# Patient Record
Sex: MALE | Race: WHITE | NOT HISPANIC OR LATINO | Employment: OTHER | URBAN - METROPOLITAN AREA
[De-identification: names, ages, dates, MRNs, and addresses within clinical notes are randomized per-mention and may not be internally consistent; named-entity substitution may affect disease eponyms.]

---

## 2017-02-07 ENCOUNTER — LAB VISIT (OUTPATIENT)
Dept: LAB | Facility: HOSPITAL | Age: 72
End: 2017-02-07
Attending: UROLOGY
Payer: MEDICARE

## 2017-02-07 DIAGNOSIS — R97.20 ELEVATED PSA: ICD-10-CM

## 2017-02-07 LAB
PROSTATE SPECIFIC ANTIGEN, TOTAL: 5.7 NG/ML
PSA FREE MFR SERPL: 20.35 %
PSA FREE SERPL-MCNC: 1.16 NG/ML

## 2017-02-07 PROCEDURE — 36415 COLL VENOUS BLD VENIPUNCTURE: CPT | Mod: PO

## 2017-02-07 PROCEDURE — 84153 ASSAY OF PSA TOTAL: CPT

## 2017-03-01 ENCOUNTER — OFFICE VISIT (OUTPATIENT)
Dept: UROLOGY | Facility: CLINIC | Age: 72
End: 2017-03-01
Payer: MEDICARE

## 2017-03-01 VITALS
HEIGHT: 72 IN | HEART RATE: 63 BPM | BODY MASS INDEX: 28.78 KG/M2 | SYSTOLIC BLOOD PRESSURE: 113 MMHG | DIASTOLIC BLOOD PRESSURE: 77 MMHG | WEIGHT: 212.5 LBS

## 2017-03-01 DIAGNOSIS — R97.20 ELEVATED PSA: Primary | ICD-10-CM

## 2017-03-01 DIAGNOSIS — Z79.2 PROPHYLACTIC ANTIBIOTIC: ICD-10-CM

## 2017-03-01 DIAGNOSIS — N40.1 BENIGN NON-NODULAR PROSTATIC HYPERPLASIA WITH LOWER URINARY TRACT SYMPTOMS: ICD-10-CM

## 2017-03-01 LAB
BILIRUB SERPL-MCNC: ABNORMAL MG/DL
BLOOD URINE, POC: ABNORMAL
COLOR, POC UA: YELLOW
GLUCOSE UR QL STRIP: 50
KETONES UR QL STRIP: ABNORMAL
LEUKOCYTE ESTERASE URINE, POC: ABNORMAL
NITRITE, POC UA: ABNORMAL
PH, POC UA: 5
PROTEIN, POC: ABNORMAL
SPECIFIC GRAVITY, POC UA: 1.02
UROBILINOGEN, POC UA: ABNORMAL

## 2017-03-01 PROCEDURE — 99213 OFFICE O/P EST LOW 20 MIN: CPT | Mod: PBBFAC,PO | Performed by: UROLOGY

## 2017-03-01 PROCEDURE — 81002 URINALYSIS NONAUTO W/O SCOPE: CPT | Mod: PBBFAC,PO | Performed by: UROLOGY

## 2017-03-01 PROCEDURE — 99214 OFFICE O/P EST MOD 30 MIN: CPT | Mod: S$PBB,,, | Performed by: UROLOGY

## 2017-03-01 PROCEDURE — 99999 PR PBB SHADOW E&M-EST. PATIENT-LVL III: CPT | Mod: PBBFAC,,, | Performed by: UROLOGY

## 2017-03-01 RX ORDER — TRIAMCINOLONE ACETONIDE 5 MG/G
CREAM TOPICAL
Refills: 0 | COMMUNITY
Start: 2017-02-07 | End: 2017-03-10 | Stop reason: CLARIF

## 2017-03-01 RX ORDER — CIPROFLOXACIN 500 MG/1
500 TABLET ORAL 2 TIMES DAILY
Qty: 6 TABLET | Refills: 0 | Status: SHIPPED | OUTPATIENT
Start: 2017-03-12 | End: 2017-03-15

## 2017-03-01 RX ORDER — FLUTICASONE PROPIONATE 50 MCG
SPRAY, SUSPENSION (ML) NASAL
Refills: 11 | COMMUNITY
Start: 2017-01-07 | End: 2018-10-19 | Stop reason: SDUPTHER

## 2017-03-01 NOTE — PROGRESS NOTES
[place]OLOGY [place]  3 1 17    Urinalysis: col yellow, sg 25, pH 5, leuco -, nitrites -, prot trace, glucose 50, bili -, blood -    c-c bph    Age 72, comes in for follow up on his bladder symptoms, has nocturia x 3-4, has some daytime frequency.  No pains or burning. Also, follows up on his psa level, which continues high. It was 5.7 last week, it was also 5.7 one year ago, and it was 4.7 two years ago.    Pt is concerned that the psa had not gone down. He has not had a prostate biopsy.    Pt has been put on flomax for symptoms of bph    He still has some daytime frequency which is off and on.        PMH     Surgical:  has past surgical history that includes Shoulder surgery; Tonsillectomy; and Elbow Arthroplasty.     Medical:  has a past medical history of Constipation, chronic; Hypothyroidism; Irregular heart beat; Hiatal hernia; and Inguinal hernia without mention of obstruction or gangrene, unilateral or unspecified, (not specified as recurrent).     Familial: both parents  of lung cancer     Social: , retired from the [employer]; has a small farm.            Current Outpatient Prescriptions on File Prior to Visit   Medication Sig Dispense Refill    aluminum hydrox-magnesium carb (GAVISCON EXTRA STRENGTH) 254-237.5 mg/5 mL Susp Take by mouth continuous prn.         aspirin (STANBACK HEADACHE POWDER) 650 mg Pack Take 650 mg by mouth as needed.         azelastine (ASTELIN) 137 mcg nasal spray 1 spray by Nasal route 2 (two) times daily.         fluticasone (FLONASE) 50 mcg/actuation nasal spray 1 spray by Each Nare route once daily.        levothyroxine (SYNTHROID) 88 MCG tablet Take 88 mcg by mouth once daily.        linaclotide (LINZESS) 145 mcg Cap Take 145 mcg by mouth once daily.        MULTIVITAMIN W-MINERALS/LUTEIN (CENTRUM SILVER ORAL) Take by mouth continuous prn.         omeprazole (PRILOSEC) 20 MG capsule Take 20 mg by mouth once daily.                                                 Current Facility-Administered Medications on File Prior to Visit   Medication Dose Route Frequency Provider Last Rate Last Dose    barium 2.1 % (w/v), 2.0 % (w/w) suspension 900 mL 900 mL Oral Once Ricky Arriola MD            ROS:  Denies malaise, headaches, eye symptoms, difficulty swallowing or breathing problems.   No chest pains or palpitations.   No change in bowel habits, no tarry stools or hematochezia. Has acid reflux.  No genital lesions.  No bleeding disorders, no seizures.     Pt alert, oriented, cooperative, no distress  HEENT: normocephalic, pupils round, equal, reactive to light and acommodation, extrinsic ocular movements full, conjunctiva pink. Oral and nasal cavities wnl.  Neck: supple, no JVD, no lymphadenopathy  Chest: CV NSR, no murmurs  Lungs: normal auscultation  Abdomen flat, nontender, no organomegaly, no masses.  No hernias  Penis nl, meatus nl  Testes noncircumcised; redundant prepuce is present, but slides well, epi nl, scrotum nl  ASHOK: anus nl, sphincter nl tone, mucosa without lesions, prostate 40 gm, symmetric, no nodules or indurations  Extremities: no edema, peripheral pulses nl  Neuro: preserved     IMP bph on flomax  Mild overactive bladder  Elevated psa, has not had a prostate biopsy, will suggest to do that.  Pt is willing to get that done, since he is worried about it.

## 2017-03-06 DIAGNOSIS — N40.0 BENIGN NON-NODULAR PROSTATIC HYPERPLASIA WITHOUT LOWER URINARY TRACT SYMPTOMS: ICD-10-CM

## 2017-03-06 RX ORDER — TAMSULOSIN HYDROCHLORIDE 0.4 MG/1
CAPSULE ORAL
Qty: 30 CAPSULE | Refills: 11 | Status: SHIPPED | OUTPATIENT
Start: 2017-03-06 | End: 2018-03-19 | Stop reason: SDUPTHER

## 2017-03-10 ENCOUNTER — ANESTHESIA EVENT (OUTPATIENT)
Dept: SURGERY | Facility: HOSPITAL | Age: 72
End: 2017-03-10
Payer: MEDICARE

## 2017-03-13 ENCOUNTER — SURGERY (OUTPATIENT)
Age: 72
End: 2017-03-13

## 2017-03-13 ENCOUNTER — ANESTHESIA (OUTPATIENT)
Dept: SURGERY | Facility: HOSPITAL | Age: 72
End: 2017-03-13
Payer: MEDICARE

## 2017-03-13 ENCOUNTER — HOSPITAL ENCOUNTER (OUTPATIENT)
Facility: HOSPITAL | Age: 72
Discharge: HOME | End: 2017-03-13
Attending: UROLOGY | Admitting: UROLOGY
Payer: MEDICARE

## 2017-03-13 DIAGNOSIS — R97.20 ELEVATED PSA: ICD-10-CM

## 2017-03-13 PROCEDURE — 88305 TISSUE EXAM BY PATHOLOGIST: CPT | Performed by: PATHOLOGY

## 2017-03-13 PROCEDURE — 36000705 HC OR TIME LEV I EA ADD 15 MIN: Mod: PO | Performed by: UROLOGY

## 2017-03-13 PROCEDURE — 25000003 PHARM REV CODE 250: Mod: PO | Performed by: NURSE ANESTHETIST, CERTIFIED REGISTERED

## 2017-03-13 PROCEDURE — 76872 US TRANSRECTAL: CPT | Mod: 26,,, | Performed by: UROLOGY

## 2017-03-13 PROCEDURE — 71000033 HC RECOVERY, INTIAL HOUR: Mod: PO | Performed by: UROLOGY

## 2017-03-13 PROCEDURE — 36000704 HC OR TIME LEV I 1ST 15 MIN: Mod: PO | Performed by: UROLOGY

## 2017-03-13 PROCEDURE — D9220A PRA ANESTHESIA: Mod: CRNA,,, | Performed by: NURSE ANESTHETIST, CERTIFIED REGISTERED

## 2017-03-13 PROCEDURE — 63600175 PHARM REV CODE 636 W HCPCS: Mod: PO | Performed by: NURSE ANESTHETIST, CERTIFIED REGISTERED

## 2017-03-13 PROCEDURE — D9220A PRA ANESTHESIA: Mod: ANES,,, | Performed by: ANESTHESIOLOGY

## 2017-03-13 PROCEDURE — 76942 ECHO GUIDE FOR BIOPSY: CPT | Mod: 26,59,, | Performed by: UROLOGY

## 2017-03-13 PROCEDURE — 25000003 PHARM REV CODE 250: Mod: PO | Performed by: UROLOGY

## 2017-03-13 PROCEDURE — 37000008 HC ANESTHESIA 1ST 15 MINUTES: Mod: PO | Performed by: UROLOGY

## 2017-03-13 PROCEDURE — 37000009 HC ANESTHESIA EA ADD 15 MINS: Mod: PO | Performed by: UROLOGY

## 2017-03-13 PROCEDURE — 88305 TISSUE EXAM BY PATHOLOGIST: CPT | Mod: 26,,, | Performed by: PATHOLOGY

## 2017-03-13 PROCEDURE — 25000003 PHARM REV CODE 250: Mod: PO | Performed by: ANESTHESIOLOGY

## 2017-03-13 PROCEDURE — 55700 PR BIOPSY OF PROSTATE,NEEDLE/PUNCH: CPT | Mod: ,,, | Performed by: UROLOGY

## 2017-03-13 RX ORDER — SODIUM CHLORIDE, SODIUM LACTATE, POTASSIUM CHLORIDE, CALCIUM CHLORIDE 600; 310; 30; 20 MG/100ML; MG/100ML; MG/100ML; MG/100ML
INJECTION, SOLUTION INTRAVENOUS CONTINUOUS
Status: DISCONTINUED | OUTPATIENT
Start: 2017-03-13 | End: 2017-03-13 | Stop reason: HOSPADM

## 2017-03-13 RX ORDER — MIDAZOLAM HYDROCHLORIDE 1 MG/ML
INJECTION, SOLUTION INTRAMUSCULAR; INTRAVENOUS
Status: DISCONTINUED | OUTPATIENT
Start: 2017-03-13 | End: 2017-03-13

## 2017-03-13 RX ORDER — LIDOCAINE HYDROCHLORIDE 10 MG/ML
1 INJECTION, SOLUTION EPIDURAL; INFILTRATION; INTRACAUDAL; PERINEURAL ONCE
Status: COMPLETED | OUTPATIENT
Start: 2017-03-13 | End: 2017-03-13

## 2017-03-13 RX ORDER — LIDOCAINE HYDROCHLORIDE 10 MG/ML
INJECTION INFILTRATION; PERINEURAL
Status: DISCONTINUED | OUTPATIENT
Start: 2017-03-13 | End: 2017-03-13 | Stop reason: HOSPADM

## 2017-03-13 RX ORDER — PROPOFOL 10 MG/ML
VIAL (ML) INTRAVENOUS
Status: DISCONTINUED | OUTPATIENT
Start: 2017-03-13 | End: 2017-03-13

## 2017-03-13 RX ORDER — OXYCODONE AND ACETAMINOPHEN 5; 325 MG/1; MG/1
1 TABLET ORAL
Status: DISCONTINUED | OUTPATIENT
Start: 2017-03-13 | End: 2017-03-13 | Stop reason: HOSPADM

## 2017-03-13 RX ORDER — ACETAMINOPHEN 500 MG
500 TABLET ORAL EVERY 4 HOURS PRN
Status: DISCONTINUED | OUTPATIENT
Start: 2017-03-13 | End: 2017-03-13 | Stop reason: HOSPADM

## 2017-03-13 RX ORDER — PROPOFOL 10 MG/ML
VIAL (ML) INTRAVENOUS CONTINUOUS PRN
Status: DISCONTINUED | OUTPATIENT
Start: 2017-03-13 | End: 2017-03-13

## 2017-03-13 RX ORDER — FENTANYL CITRATE 50 UG/ML
INJECTION, SOLUTION INTRAMUSCULAR; INTRAVENOUS
Status: DISCONTINUED | OUTPATIENT
Start: 2017-03-13 | End: 2017-03-13

## 2017-03-13 RX ORDER — SODIUM CHLORIDE, SODIUM LACTATE, POTASSIUM CHLORIDE, CALCIUM CHLORIDE 600; 310; 30; 20 MG/100ML; MG/100ML; MG/100ML; MG/100ML
25 INJECTION, SOLUTION INTRAVENOUS CONTINUOUS
Status: DISCONTINUED | OUTPATIENT
Start: 2017-03-13 | End: 2017-03-13 | Stop reason: HOSPADM

## 2017-03-13 RX ORDER — SODIUM CHLORIDE 0.9 % (FLUSH) 0.9 %
3 SYRINGE (ML) INJECTION
Status: DISCONTINUED | OUTPATIENT
Start: 2017-03-13 | End: 2017-03-13 | Stop reason: HOSPADM

## 2017-03-13 RX ORDER — HYDROCODONE BITARTRATE AND ACETAMINOPHEN 5; 325 MG/1; MG/1
1 TABLET ORAL EVERY 4 HOURS PRN
Status: DISCONTINUED | OUTPATIENT
Start: 2017-03-13 | End: 2017-03-13 | Stop reason: HOSPADM

## 2017-03-13 RX ORDER — FENTANYL CITRATE 50 UG/ML
25 INJECTION, SOLUTION INTRAMUSCULAR; INTRAVENOUS EVERY 5 MIN PRN
Status: DISCONTINUED | OUTPATIENT
Start: 2017-03-13 | End: 2017-03-13 | Stop reason: HOSPADM

## 2017-03-13 RX ORDER — ONDANSETRON 2 MG/ML
4 INJECTION INTRAMUSCULAR; INTRAVENOUS DAILY PRN
Status: DISCONTINUED | OUTPATIENT
Start: 2017-03-13 | End: 2017-03-13 | Stop reason: HOSPADM

## 2017-03-13 RX ORDER — LIDOCAINE HCL/PF 100 MG/5ML
SYRINGE (ML) INTRAVENOUS
Status: DISCONTINUED | OUTPATIENT
Start: 2017-03-13 | End: 2017-03-13

## 2017-03-13 RX ADMIN — MIDAZOLAM HYDROCHLORIDE 1 MG: 1 INJECTION, SOLUTION INTRAMUSCULAR; INTRAVENOUS at 09:03

## 2017-03-13 RX ADMIN — FENTANYL CITRATE 50 MCG: 50 INJECTION, SOLUTION INTRAMUSCULAR; INTRAVENOUS at 09:03

## 2017-03-13 RX ADMIN — LIDOCAINE HYDROCHLORIDE 10 ML: 10 INJECTION, SOLUTION EPIDURAL; INFILTRATION; INTRACAUDAL; PERINEURAL at 09:03

## 2017-03-13 RX ADMIN — SODIUM CHLORIDE, SODIUM LACTATE, POTASSIUM CHLORIDE, AND CALCIUM CHLORIDE: .6; .31; .03; .02 INJECTION, SOLUTION INTRAVENOUS at 08:03

## 2017-03-13 RX ADMIN — LIDOCAINE HYDROCHLORIDE 50 MG: 20 INJECTION PARENTERAL at 09:03

## 2017-03-13 RX ADMIN — PROPOFOL 50 MCG/KG/MIN: 10 INJECTION, EMULSION INTRAVENOUS at 09:03

## 2017-03-13 RX ADMIN — PROPOFOL 20 MG: 10 INJECTION, EMULSION INTRAVENOUS at 09:03

## 2017-03-13 RX ADMIN — LIDOCAINE HYDROCHLORIDE 10 ML: 10 INJECTION, SOLUTION INFILTRATION; PERINEURAL at 09:03

## 2017-03-13 NOTE — ANESTHESIA PREPROCEDURE EVALUATION
03/13/2017  Eligio Richardson is a 72 y.o., male.    [facility] Anesthesia Evaluation    I have reviewed the Patient Summary Reports.    I have reviewed the Nursing Notes.   I have reviewed the Medications.     Review of Systems  Anesthesia Hx:  No problems with previous Anesthesia    Social:  Non-Smoker, Alcohol Use    Cardiovascular:   Denies CAD.    Denies CABG/stent.   Denies Angina.    Pulmonary:   Sleep Apnea    Renal/:   Chronic Renal Disease, CRI Renal calculi: Cr 1.2.  Denies Other Renal / Gu Conditions   Hepatic/GI:   Hiatal Hernia, GERD, well controlled  Denies Hepatic/GI Symptoms  Denies Bowel Conditions    Musculoskeletal:   Arthritis   Spine Disorders: cervical Degenerative disease    Neurological:   Neuromuscular Disease,    Endocrine:   Hypothyroidism        Physical Exam  General:  Well nourished    Airway/Jaw/Neck:  Airway Findings: Mouth Opening: Normal Tongue: Normal  General Airway Assessment: Adult, Good  Mallampati: II  TM Distance: Normal, at least 6 cm  Jaw/Neck Findings:  Neck ROM: Extension Decreased, Mild, Extension Painful       Chest/Lungs:  Chest/Lungs Findings: Clear to auscultation, Normal Respiratory Rate     Heart/Vascular:  Heart Findings: Rate: Normal  Rhythm: Regular Rhythm  Sounds: Normal        Mental Status:  Mental Status Findings:  Cooperative, Alert and Oriented         Anesthesia Plan  Type of Anesthesia, risks & benefits discussed:  Anesthesia Type:  MAC  Patient's Preference:   Intra-op Monitoring Plan:   Intra-op Monitoring Plan Comments:   Post Op Pain Control Plan:   Post Op Pain Control Plan Comments:   Induction:   IV  Beta Blocker:  Patient is not currently on a Beta-Blocker (No further documentation required).       Informed Consent: Patient understands risks and agrees with Anesthesia plan.  Questions answered. Anesthesia consent signed with patient.  ASA Score:  3     Day of Surgery Review of History & Physical: I have interviewed and examined the patient. I have reviewed the patient's H&P dated:  There are no significant changes.          Ready For Surgery From Anesthesia Perspective.

## 2017-03-13 NOTE — OP NOTE
Site: [facility], [place]  Date of procedure: 3 13 17  Surgeon: Brandi  Assistant: none  Procedure: TRANSRECTAL ULTRASOUND OF PROSTATE WITH NEEDLE BIOPSIES, ULTRASOUND GUIDANCE FOR BIOPSIES  Preop dx: high psa  Postop dx: same  Complications: none  Ebl: none  Drains: none    Patient took a prophylactic antibiotic starting last night. He also gave himself a Fleet enema 2 hours prior to the procedure.     Pt was brought to the operating room and given satisfactory anesthesia. He was then placed in the L lateral decubitus position. We instilled a 10-ml enema of pure Betadine solution in the rectal ampulla and waited a few minutes.   The rectal probe of the Peppercorn 10-MHz BNephroPlus Ultrasound machine was inserted and multiple transverse and longitudinal scans were done.     Prostate measurements:    Prostate volume was 45.8 cm3  Transverse dimension 47.4 mm  Height 30.8 mm  Length 59.9 mm.      The general shape of the prostate was symmetric. Several small calcific densities were seen, as well as small cystic lesions. No definite hypoechoic areas were seen.     We performed a prostate block (pudendal block) with a total of 10 ml of xylocaine applied at the prostate base and prostate apex on both sides. Multiple biopsies were obtained using the Joosy Magnum Biopsy Needle under ultrasound guidance. We used the sextant topographic technique to distribute and send the biopsy samples. The patient tolerated the procedure well. He was then transferred to recovery room in satisfactory condition.    Patient was warned about possible complications, such as persistent hematuria, hematochezia, hematospermia, infection and sepsis. He is to continue with the antibiotic given until finished. Drink abundant fluids. Call in one week for pathology results

## 2017-03-13 NOTE — DISCHARGE SUMMARY
DISCHARGE SUMMARY:    Reason for hospitalization: elevated psa  Hospital course: pt was stable and comfortable  Final dx: elevated psa  Procedure performed: transrectal ultrasound of prostate, ultrasound guidance for needle biopsies, multiple transrectal needle biopsies  Condition on discharge: satisfactory  Discharge disposition: home  Follow up: Return to urology clinic in 1 week  Medication changes: no change with home meds  Pt instructions: drink abundant fluids  Continue with prescribed antibiotic prophylaxis until finished  Diet: regular  Activity: no restrictions

## 2017-03-13 NOTE — IP AVS SNAPSHOT
[facility]  [address] [facility]  [place] [address]  Phone: [phone]           Patient Discharge Instructions     Our goal is to set you up for success. This packet includes information on your condition, medications, and your home care. It will help you to care for yourself so you don't get sicker and need to go back to the hospital.     Please ask your nurse if you have any questions.        There are many details to remember when preparing to leave the hospital. Here is what you will need to do:    1. Take your medicine. If you are prescribed medications, review your Medication List in the following pages. You may have new medications to  at the pharmacy and others that you'll need to stop taking. Review the instructions for how and when to take your medications. Talk with your doctor or nurses if you are unsure of what to do.     2. Go to your follow-up appointments. Specific follow-up information is listed in the following pages. Your may be contacted by a transition nurse or clinical provider about future appointments. Be sure we have all of the phone numbers to reach you, if needed. Please contact your provider's office if you are unable to make an appointment.     3. Watch for warning signs. Your doctor or nurse will give you detailed warning signs to watch for and when to call for assistance. These instructions may also include educational information about your condition. If you experience any of warning signs to your health, call your doctor.               [facility]  Unless otherwise directed by your provider, please contact [facility], our nurse care line that is available for 24/7 assistance.     [phone] (toll-free)    Registered nurses in the [facility] provide clinical advisement, health education, appointment booking, and other advisory services.                    ** Verify the list of medication(s) below is accurate and up  to date. Carry this with you in case of emergency. If your medications have changed, please notify your healthcare provider.             Medication List      CONTINUE taking these medications        Additional Info                      ciprofloxacin HCl 500 MG tablet   Commonly known as:  CIPRO   Quantity:  6 tablet   Refills:  0   Dose:  500 mg    Instructions:  Take 1 tablet (500 mg total) by mouth 2 (two) times daily.     Begin Date    AM    Noon    PM    Bedtime       fluticasone 50 mcg/actuation nasal spray   Commonly known as:  FLONASE   Refills:  11    Instructions:  USE 2 SPRAYS EACH NOSTRIL DAILY     Begin Date    AM    Noon    PM    Bedtime       GAVISCON EXTRA STRENGTH 254-237.5 mg/5 mL Susp   Refills:  0   Generic drug:  aluminum hydrox-magnesium carb    Instructions:  Take by mouth continuous prn.     Begin Date    AM    Noon    PM    Bedtime       levothyroxine 88 MCG tablet   Commonly known as:  SYNTHROID   Quantity:  90 tablet   Refills:  3   Comments:  This prescription was filled today(11/14/2016). Any refills authorized will be placed on file.    Instructions:  TAKE 1 TABLET (88 MCG TOTAL) BY MOUTH ONCE DAILY.     Begin Date    AM    Noon    PM    Bedtime       linaclotide 145 mcg Cap capsule   Commonly known as:  LINZESS   Quantity:  60 capsule   Refills:  6   Dose:  145 mcg    Instructions:  Take 1 capsule (145 mcg total) by mouth Every 3 (three) days.     Begin Date    AM    Noon    PM    Bedtime       omeprazole 20 MG capsule   Commonly known as:  PRILOSEC   Quantity:  90 capsule   Refills:  3   Comments:  This prescription was filled today(2/2/2017). Any refills authorized will be placed on file.    Instructions:  TAKE ONE CAPSULE EVERY DAY FOR REFLUX     Begin Date    AM    Noon    PM    Bedtime       oxybutynin 10 MG 24 hr tablet   Commonly known as:  DITROPAN-XL   Quantity:  30 tablet   Refills:  11   Dose:  10 mg    Instructions:  Take 1 tablet (10 mg total) by mouth once daily.     Begin  Date    AM    Noon    PM    Bedtime       STANBACK HEADACHE POWDER 650 mg Pack   Refills:  0   Dose:  650 mg   Generic drug:  aspirin    Instructions:  Take 650 mg by mouth as needed.     Begin Date    AM    Noon    PM    Bedtime       tamsulosin 0.4 mg Cp24   Commonly known as:  FLOMAX   Quantity:  30 capsule   Refills:  11    Instructions:  TAKE 1 CAPSULE (0.4 MG TOTAL) BY MOUTH ONCE DAILY.     Begin Date    AM    Noon    PM    Bedtime                  Please bring to all follow up appointments:    1. A copy of your discharge instructions.  2. All medicines you are currently taking in their original bottles.  3. Identification and insurance card.    Please arrive 15 minutes ahead of scheduled appointment time.    Please call 24 hours in advance if you must reschedule your appointment and/or time.        Follow-up Information     Follow up with Andry Paz MD In 1 week.    Specialty:  Urology    Contact information:    [address]  [facility] [address]  [phone]          Discharge Instructions     Future Orders    Activity as tolerated     Diet general     Questions:    Total calories:      Fat restriction, if any:      Protein restriction, if any:      Na restriction, if any:      Fluid restriction:      Additional restrictions:          Discharge Instructions         Discharge Instructions: After Your Surgery  Youve just had surgery. During surgery you were given medicine called anesthesia to keep you relaxed and free of pain. After surgery you may have some pain or nausea. This is common. Here are some tips for feeling better and getting well after surgery.     Stay on schedule with your medication.   Going home  Your doctor or nurse will show you how to take care of yourself when you go home. He or she will also answer your questions. Have an adult family member or friend drive you home. For the first 24 hours after your surgery:  · Do not drive or use heavy equipment.  · Do not make important  decisions or sign legal papers.  · Do not drink alcohol.  · Have someone stay with you, if needed. He or she can watch for problems and help keep you safe.  Be sure to go to all follow-up visits with your doctor. And rest after your surgery for as long as your doctor tells you to.  Coping with pain  If you have pain after surgery, pain medicine will help you feel better. Take it as told, before pain becomes severe. Also, ask your doctor or pharmacist about other ways to control pain. This might be with heat, ice, or relaxation. And follow any other instructions your surgeon or nurse gives you.  Tips for taking pain medicine  To get the best relief possible, remember these points:  · Pain medicines can upset your stomach. Taking them with a little food may help.  · Most pain relievers taken by mouth need at least 20 to 30 minutes to start to work.  · Taking medicine on a schedule can help you remember to take it. Try to time your medicine so that you can take it before starting an activity. This might be before you get dressed, go for a walk, or sit down for dinner.  · Constipation is a common side effect of pain medicines. Call your doctor before taking any medicines such as laxatives or stool softeners to help ease constipation. Also ask if you should skip any foods. Drinking lots of fluids and eating foods such as fruits and vegetables that are high in fiber can also help. Remember, do not take laxatives unless your surgeon has prescribed them.  · Drinking alcohol and taking pain medicine can cause dizziness and slow your breathing. It can even be deadly. Do not drink alcohol while taking pain medicine.  · Pain medicine can make you react more slowly to things. Do not drive or run machinery while taking pain medicine.  Your health care provider may tell you to take acetaminophen to help ease your pain. Ask him or her how much you are supposed to take each day. Acetaminophen or other pain relievers may interact  with your prescription medicines or other over-the-counter (OTC) drugs. Some prescription medicines have acetaminophen and other ingredients. Using both prescription and OTC acetaminophen for pain can cause you to overdose. Read the labels on your OTC medicines with care. This will help you to clearly know the list of ingredients, how much to take, and any warnings. It may also help you not take too much acetaminophen. If you have questions or do not understand the information, ask your pharmacist or health care provider to explain it to you before you take the OTC medicine.  Managing nausea  Some people have an upset stomach after surgery. This is often because of anesthesia, pain, or pain medicine, or the stress of surgery. These tips will help you handle nausea and eat healthy foods as you get better. If you were on a special food plan before surgery, ask your doctor if you should follow it while you get better. These tips may help:  · Do not push yourself to eat. Your body will tell you when to eat and how much.  · Start off with clear liquids and soup. They are easier to digest.  · Next try semi-solid foods, such as mashed potatoes, applesauce, and gelatin, as you feel ready.  · Slowly move to solid foods. Dont eat fatty, rich, or spicy foods at first.  · Do not force yourself to have 3 large meals a day. Instead eat smaller amounts more often.  · Take pain medicines with a small amount of solid food, such as crackers or toast, to avoid nausea.     Call your surgeon if  · You still have pain an hour after taking medicine. The medicine may not be strong enough.  · You feel too sleepy, dizzy, or groggy. The medicine may be too strong.  · You have side effects like nausea, vomiting, or skin changes, such as rash, itching, or hives.       If you have obstructive sleep apnea  You were given anesthesia medicine during surgery to keep you comfortable and free of pain. After surgery, you may have more apnea spells  because of this medicine and other medicines you were given. The spells may last longer than usual.   At home:  · Keep using the continuous positive airway pressure (CPAP) device when you sleep. Unless your health care provider tells you not to, use it when you sleep, day or night. CPAP is a common device used to treat obstructive sleep apnea.  · Talk with your provider before taking any pain medicine, muscle relaxants, or sedatives. Your provider will tell you about the possible dangers of taking these medicines.  Date Last Reviewed: 10/16/2014  © 4118-2326 [employer]. [address] [place] [address]. All rights reserved. This information is not intended as a substitute for professional medical care. Always follow your healthcare professional's instructions.            PROSTATE BIOPSY      DOS:   Minimal activity for 24 hours.   May shower or bathe today.   Advance diet as tolerated.   Drink a lot of liquids until you see your doctor.   Expect blood in urine/stool for up to 3 weeks.   Expect blood in semen for up to 8 weeks.   Resume home medications as prescribed    DONT:   No driving for 24 hours or while taking narcotic pain medication   No aspirin, NSAIDS or blood thinners for 7 days   No sexual intercourse, heavy lifting, or strenuous activity for 7 days.   DO NOT TAKE ADDITIONAL TYLENOL/ACETAMINOPHEN WHILE TAKING NARCOTIC PAIN MEDICATION THAT CONTAINS TYLENOL/ACETAMINOPHEN.    CALL PHYSICIAN FOR:   Unable to urinate within 6 hours after surgery.   Excessive bleeding.    Fever>101   Persistent pain not relieved by pain medication.    Contact your physician for emergencies at [phone]          Primary Diagnosis     Your primary diagnosis was:  Elevated Prostate Specific Antigen (Psa)      Admission Information     Date & Time Provider Department [facility]    3/13/2017  7:43 AM Andry Paz MD [facility] [phone]      Care Providers     Provider  Role Specialty Primary office phone    Andry Paz MD Attending Provider Urology [phone]    Andry Paz MD Surgeon  Urology [phone]      Your Vitals Were     BP Pulse Temp Resp Height Weight    130/81 (BP Location: Left arm, Patient Position: Sitting, BP Method: Automatic) 60 97.9 °F (36.6 °C) (Skin) 16 6' (1.829 m) 96.2 kg (212 lb)    SpO2 BMI             95% 28.75 kg/m2         Recent Lab Values     No lab values to display.      Allergies as of 3/13/2017        Reactions    Iodinated Contrast Media - Iv Dye Rash      Advance Directives     An advance directive is a document which, in the event you are no longer able to make decisions for yourself, tells your healthcare team what kind of treatment you do or do not want to receive, or who you would like to make those decisions for you.  If you do not currently have an advance directive, [facility] encourages you to create one.  For more information call:  [phone]), [phone] ([phone]),  or log on to [email or web address].        Language Assistance Services     ATTENTION: Language assistance services are available, free of charge. Please call [phone].      ATENCIÓN: Si habla español, tiene a chambers disposición servicios gratuitos de asistencia lingüística. Llame al [phone].     CHÚ Ý: N?u b?n nói Ti?ng Vi?t, có các d?ch v? h? tr? ngôn ng? mi?n phí dành cho b?n. G?i s? [phone].         [facility] complies with applicable Federal civil rights laws and does not discriminate on the basis of race, color, national origin, age, disability, or sex.

## 2017-03-13 NOTE — TRANSFER OF CARE
Anesthesia Transfer of Care Note    Patient: Eligio Richardson    Procedure(s) Performed: Procedure(s) (LRB):  TRANSRECTAL ULTRASOUND GUIDED PROSTATE BIOPSY (N/A)    Patient location: PACU    Anesthesia Type: MAC    Transport from OR: Transported from OR on room air with adequate spontaneous ventilation    Post pain: adequate analgesia    Post assessment: no apparent anesthetic complications and tolerated procedure well    Post vital signs: stable    Level of consciousness: awake and sedated    Nausea/Vomiting: no nausea/vomiting    Complications: none          Last vitals:   Visit Vitals    BP (!) 150/72 (BP Location: Right arm, Patient Position: Lying, BP Method: Automatic)    Pulse 61    Temp 36.7 °C (98.1 °F) (Skin)    Resp 16    Ht 6' (1.829 m)    Wt 96.2 kg (212 lb)    BMI 28.75 kg/m2

## 2017-03-13 NOTE — ANESTHESIA POSTPROCEDURE EVALUATION
Anesthesia Post Evaluation    Patient: Eligio Richardson    Procedure(s) Performed: Procedure(s) (LRB):  TRANSRECTAL ULTRASOUND GUIDED PROSTATE BIOPSY (N/A)    Final Anesthesia Type: general  Patient location during evaluation: PACU  Patient participation: Yes- Able to Participate  Level of consciousness: awake and alert  Post-procedure vital signs: reviewed and stable  Pain management: adequate  Airway patency: patent  PONV status at discharge: No PONV  Anesthetic complications: no      Cardiovascular status: blood pressure returned to baseline and hemodynamically stable  Respiratory status: unassisted  Hydration status: euvolemic  Follow-up not needed.        Visit Vitals    BP (!) 143/74 (BP Location: Left arm, Patient Position: Sitting, BP Method: Automatic)    Pulse (!) 55    Temp 36.6 °C (97.9 °F) (Skin)    Resp 16    Ht 6' (1.829 m)    Wt 96.2 kg (212 lb)    SpO2 97%    BMI 28.75 kg/m2       Pain/Mellissa Score: Pain Assessment Performed: Yes (3/13/2017 10:16 AM)  Presence of Pain: denies (3/13/2017 10:16 AM)  Mellissa Score: 10 (3/13/2017 10:16 AM)

## 2017-03-13 NOTE — DISCHARGE INSTRUCTIONS
Discharge Instructions: After Your Surgery  Youve just had surgery. During surgery you were given medicine called anesthesia to keep you relaxed and free of pain. After surgery you may have some pain or nausea. This is common. Here are some tips for feeling better and getting well after surgery.     Stay on schedule with your medication.   Going home  Your doctor or nurse will show you how to take care of yourself when you go home. He or she will also answer your questions. Have an adult family member or friend drive you home. For the first 24 hours after your surgery:  · Do not drive or use heavy equipment.  · Do not make important decisions or sign legal papers.  · Do not drink alcohol.  · Have someone stay with you, if needed. He or she can watch for problems and help keep you safe.  Be sure to go to all follow-up visits with your doctor. And rest after your surgery for as long as your doctor tells you to.  Coping with pain  If you have pain after surgery, pain medicine will help you feel better. Take it as told, before pain becomes severe. Also, ask your doctor or pharmacist about other ways to control pain. This might be with heat, ice, or relaxation. And follow any other instructions your surgeon or nurse gives you.  Tips for taking pain medicine  To get the best relief possible, remember these points:  · Pain medicines can upset your stomach. Taking them with a little food may help.  · Most pain relievers taken by mouth need at least 20 to 30 minutes to start to work.  · Taking medicine on a schedule can help you remember to take it. Try to time your medicine so that you can take it before starting an activity. This might be before you get dressed, go for a walk, or sit down for dinner.  · Constipation is a common side effect of pain medicines. Call your doctor before taking any medicines such as laxatives or stool softeners to help ease constipation. Also ask if you should skip any foods. Drinking lots of  fluids and eating foods such as fruits and vegetables that are high in fiber can also help. Remember, do not take laxatives unless your surgeon has prescribed them.  · Drinking alcohol and taking pain medicine can cause dizziness and slow your breathing. It can even be deadly. Do not drink alcohol while taking pain medicine.  · Pain medicine can make you react more slowly to things. Do not drive or run machinery while taking pain medicine.  Your health care provider may tell you to take acetaminophen to help ease your pain. Ask him or her how much you are supposed to take each day. Acetaminophen or other pain relievers may interact with your prescription medicines or other over-the-counter (OTC) drugs. Some prescription medicines have acetaminophen and other ingredients. Using both prescription and OTC acetaminophen for pain can cause you to overdose. Read the labels on your OTC medicines with care. This will help you to clearly know the list of ingredients, how much to take, and any warnings. It may also help you not take too much acetaminophen. If you have questions or do not understand the information, ask your pharmacist or health care provider to explain it to you before you take the OTC medicine.  Managing nausea  Some people have an upset stomach after surgery. This is often because of anesthesia, pain, or pain medicine, or the stress of surgery. These tips will help you handle nausea and eat healthy foods as you get better. If you were on a special food plan before surgery, ask your doctor if you should follow it while you get better. These tips may help:  · Do not push yourself to eat. Your body will tell you when to eat and how much.  · Start off with clear liquids and soup. They are easier to digest.  · Next try semi-solid foods, such as mashed potatoes, applesauce, and gelatin, as you feel ready.  · Slowly move to solid foods. Dont eat fatty, rich, or spicy foods at first.  · Do not force yourself to  have 3 large meals a day. Instead eat smaller amounts more often.  · Take pain medicines with a small amount of solid food, such as crackers or toast, to avoid nausea.     Call your surgeon if  · You still have pain an hour after taking medicine. The medicine may not be strong enough.  · You feel too sleepy, dizzy, or groggy. The medicine may be too strong.  · You have side effects like nausea, vomiting, or skin changes, such as rash, itching, or hives.       If you have obstructive sleep apnea  You were given anesthesia medicine during surgery to keep you comfortable and free of pain. After surgery, you may have more apnea spells because of this medicine and other medicines you were given. The spells may last longer than usual.   At home:  · Keep using the continuous positive airway pressure (CPAP) device when you sleep. Unless your health care provider tells you not to, use it when you sleep, day or night. CPAP is a common device used to treat obstructive sleep apnea.  · Talk with your provider before taking any pain medicine, muscle relaxants, or sedatives. Your provider will tell you about the possible dangers of taking these medicines.  Date Last Reviewed: 10/16/2014  © 3017-7682 [employer]. [address] [place] [address], [place] [address]. All rights reserved. This information is not intended as a substitute for professional medical care. Always follow your healthcare professional's instructions.            PROSTATE BIOPSY      DOS:   Minimal activity for 24 hours.   May shower or bathe today.   Advance diet as tolerated.   Drink a lot of liquids until you see your doctor.   Expect blood in urine/stool for up to 3 weeks.   Expect blood in semen for up to 8 weeks.   Resume home medications as prescribed    DONT:   No driving for 24 hours or while taking narcotic pain medication   No aspirin, NSAIDS or blood thinners for 7 days   No sexual intercourse, heavy lifting, or strenuous activity  for 7 days.   DO NOT TAKE ADDITIONAL TYLENOL/ACETAMINOPHEN WHILE TAKING NARCOTIC PAIN MEDICATION THAT CONTAINS TYLENOL/ACETAMINOPHEN.    CALL PHYSICIAN FOR:   Unable to urinate within 6 hours after surgery.   Excessive bleeding.    Fever>101   Persistent pain not relieved by pain medication.    Contact your physician for emergencies at [phone]

## 2017-03-13 NOTE — TRANSFER OF CARE
Anesthesia Transfer of Care Note    Patient: Eligio Richardson    Procedure(s) Performed: Procedure(s) (LRB):  TRANSRECTAL ULTRASOUND GUIDED PROSTATE BIOPSY (N/A)    Patient location: PACU    Anesthesia Type: MAC    Transport from OR: Transported from OR on room air with adequate spontaneous ventilation    Post pain: adequate analgesia    Post assessment: no apparent anesthetic complications and tolerated procedure well    Post vital signs: stable    Level of consciousness: awake and sedated    Nausea/Vomiting: no nausea/vomiting    Complications: none          Last vitals:   Visit Vitals    /67 (BP Location: Left arm, Patient Position: Lying, BP Method: Automatic)    Pulse 66    Temp 36.6 °C (97.9 °F) (Skin)    Resp 16    Ht 6' (1.829 m)    Wt 96.2 kg (212 lb)    SpO2 (!) 94%    BMI 28.75 kg/m2

## 2017-03-13 NOTE — H&P
Age 72, with elevated psa of 5.7. The rectal exam has not shown any definite indurations or asymmetry of his prostate gland, but pt is worried because of the increased risk of prostate cancer. Pt has a family hx of cancer, although not precisely prostate cancer.     Pt has been put on flomax for symptoms of bph and has daytime frequency              PMH      Surgical: has past surgical history that includes Shoulder surgery; Tonsillectomy; and Elbow Arthroplasty.      Medical: has a past medical history of Constipation, chronic; Hypothyroidism; Irregular heart beat; Hiatal hernia; and Inguinal hernia without mention of obstruction or gangrene, unilateral or unspecified, (not specified as recurrent).      Familial: both parents  of lung cancer      Social: , retired from the [employer]; has a small farm.                  Current Outpatient Prescriptions on File Prior to Visit   Medication Sig Dispense Refill    aluminum hydrox-magnesium carb (GAVISCON EXTRA STRENGTH) 254-237.5 mg/5 mL Susp Take by mouth continuous prn.           aspirin (STANBACK HEADACHE POWDER) 650 mg Pack Take 650 mg by mouth as needed.           azelastine (ASTELIN) 137 mcg nasal spray 1 spray by Nasal route 2 (two) times daily.           fluticasone (FLONASE) 50 mcg/actuation nasal spray 1 spray by Each Nare route once daily.          levothyroxine (SYNTHROID) 88 MCG tablet Take 88 mcg by mouth once daily.          linaclotide (LINZESS) 145 mcg Cap Take 145 mcg by mouth once daily.          MULTIVITAMIN W-MINERALS/LUTEIN (CENTRUM SILVER ORAL) Take by mouth continuous prn.           omeprazole (PRILOSEC) 20 MG capsule Take 20 mg by mouth once daily.                                                                               Current Facility-Administered Medications on File Prior to Visit   Medication Dose Route Frequency Provider Last Rate Last Dose    barium 2.1 % (w/v), 2.0 % (w/w) suspension 900 mL 900 mL  Oral Once Ricky Arriola MD             ROS:  Denies malaise, headaches, eye symptoms, difficulty swallowing or breathing problems.   No chest pains or palpitations.   No change in bowel habits, no tarry stools or hematochezia. Has acid reflux.  No genital lesions.  No bleeding disorders, no seizures.      Pt alert, oriented, cooperative, no distress  HEENT: normocephalic, pupils round, equal, reactive to light and acommodation, extrinsic ocular movements full, conjunctiva pink. Oral and nasal cavities wnl.  Neck: supple, no JVD, no lymphadenopathy  Chest: CV NSR, no murmurs  Lungs: normal auscultation  Abdomen flat, nontender, no organomegaly, no masses.  No hernias  Penis nl, meatus nl  Testes noncircumcised; redundant prepuce is present, but slides well, epi nl, scrotum nl  ASHOK: anus nl, sphincter nl tone, mucosa without lesions, prostate 40 gm, symmetric, no nodules or indurations  Extremities: no edema, peripheral pulses nl  Neuro: preserved      IMP  Elevated psa  bph on flomax

## 2017-03-14 ENCOUNTER — TELEPHONE (OUTPATIENT)
Dept: UROLOGY | Facility: CLINIC | Age: 72
End: 2017-03-14

## 2017-03-14 VITALS
HEART RATE: 55 BPM | DIASTOLIC BLOOD PRESSURE: 74 MMHG | TEMPERATURE: 98 F | BODY MASS INDEX: 28.71 KG/M2 | WEIGHT: 212 LBS | HEIGHT: 72 IN | OXYGEN SATURATION: 97 % | RESPIRATION RATE: 16 BRPM | SYSTOLIC BLOOD PRESSURE: 143 MMHG

## 2017-03-14 NOTE — TELEPHONE ENCOUNTER
S/P TRUSP:  Spoke with patient, he is doing very well. NO pain, fever, discomfort, bleeding or difficulty urinating.

## 2017-03-16 ENCOUNTER — TELEPHONE (OUTPATIENT)
Dept: UROLOGY | Facility: CLINIC | Age: 72
End: 2017-03-16

## 2017-03-16 NOTE — TELEPHONE ENCOUNTER
----- Message from Andry Paz MD sent at 3/16/2017  1:13 PM CDT -----  Please let pt know that his prostate bx results were negative for malignancy. I'll see him in 6 mo.

## 2017-06-13 DIAGNOSIS — N32.81 OAB (OVERACTIVE BLADDER): ICD-10-CM

## 2017-06-13 RX ORDER — OXYBUTYNIN CHLORIDE 10 MG/1
10 TABLET, EXTENDED RELEASE ORAL DAILY
Qty: 30 TABLET | Refills: 11 | Status: SHIPPED | OUTPATIENT
Start: 2017-06-13 | End: 2018-06-06 | Stop reason: ALTCHOICE

## 2017-08-11 ENCOUNTER — OFFICE VISIT (OUTPATIENT)
Dept: OTOLARYNGOLOGY | Facility: CLINIC | Age: 72
End: 2017-08-11
Payer: MEDICARE

## 2017-08-11 VITALS
DIASTOLIC BLOOD PRESSURE: 90 MMHG | WEIGHT: 218.5 LBS | HEART RATE: 59 BPM | HEIGHT: 72 IN | BODY MASS INDEX: 29.59 KG/M2 | SYSTOLIC BLOOD PRESSURE: 148 MMHG

## 2017-08-11 DIAGNOSIS — R51.9 SEVERE FRONTAL HEADACHES: Primary | ICD-10-CM

## 2017-08-11 DIAGNOSIS — R51.9 TEMPORAL HEADACHE: ICD-10-CM

## 2017-08-11 DIAGNOSIS — R09.89 THROAT CLEARING: ICD-10-CM

## 2017-08-11 DIAGNOSIS — R09.82 PND (POST-NASAL DRIP): ICD-10-CM

## 2017-08-11 PROCEDURE — 99213 OFFICE O/P EST LOW 20 MIN: CPT | Mod: PBBFAC,PO | Performed by: NURSE PRACTITIONER

## 2017-08-11 PROCEDURE — 1159F MED LIST DOCD IN RCRD: CPT | Mod: ,,, | Performed by: NURSE PRACTITIONER

## 2017-08-11 PROCEDURE — 3008F BODY MASS INDEX DOCD: CPT | Mod: ,,, | Performed by: NURSE PRACTITIONER

## 2017-08-11 PROCEDURE — 1125F AMNT PAIN NOTED PAIN PRSNT: CPT | Mod: ,,, | Performed by: NURSE PRACTITIONER

## 2017-08-11 PROCEDURE — 99999 PR PBB SHADOW E&M-EST. PATIENT-LVL III: CPT | Mod: PBBFAC,,, | Performed by: NURSE PRACTITIONER

## 2017-08-11 PROCEDURE — 99213 OFFICE O/P EST LOW 20 MIN: CPT | Mod: S$PBB,,, | Performed by: NURSE PRACTITIONER

## 2017-08-11 NOTE — PROGRESS NOTES
"Subjective:       Patient ID: Eligio Richardson is a 72 y.o. male.    Chief Complaint: Sinus Problem; Headache; and hx of sinus polyps    HPI   Patient saw Dr. Amaro for sinus issues 4 years ago; CT was negative. H/o sinus surgery by Dr. Escalante at [facility] in 2011 for removal of polyp. He is here today for "keeps a bad headache" for over a month now. Interferes with his ability to function normally at times; must lay down on a heating pad. Headache is located across forehead and bilateral temples. Additionally he clears his throat a lot d/t PND sensation, but denies any anterior rhinorrhea, nasal congestion, or nasosinal symptoms.     Review of Systems   Constitutional: Negative.    HENT: Positive for postnasal drip. Negative for congestion and rhinorrhea.         Clearing throat often   Eyes: Negative.    Respiratory: Negative.    Cardiovascular: Negative.    Gastrointestinal: Negative.    Musculoskeletal: Negative.    Skin: Negative.    Neurological: Positive for headaches.   Hematological: Negative.    Psychiatric/Behavioral: Negative.        Objective:      Physical Exam   Constitutional: He is oriented to person, place, and time. Vital signs are normal. He appears well-developed and well-nourished. He is cooperative. He does not appear ill. No distress.   HENT:   Head: Normocephalic and atraumatic.   Right Ear: Hearing, tympanic membrane, external ear and ear canal normal. Tympanic membrane is not erythematous. No middle ear effusion.   Left Ear: Hearing, tympanic membrane, external ear and ear canal normal. Tympanic membrane is not erythematous.  No middle ear effusion.   Nose: Nose normal. No mucosal edema or rhinorrhea. Right sinus exhibits no maxillary sinus tenderness and no frontal sinus tenderness. Left sinus exhibits no maxillary sinus tenderness and no frontal sinus tenderness.   Mouth/Throat: Uvula is midline, oropharynx is clear and moist and mucous membranes are normal. Mucous membranes are not pale, " not dry and not cyanotic. No oral lesions. No oropharyngeal exudate, posterior oropharyngeal edema or posterior oropharyngeal erythema.   Eyes: Conjunctivae, EOM and lids are normal. Pupils are equal, round, and reactive to light. Right eye exhibits no discharge. Left eye exhibits no discharge. No scleral icterus.   Neck: Trachea normal and normal range of motion. Neck supple. No tracheal deviation present. No thyroid mass and no thyromegaly present.   Cardiovascular: Normal rate.    Pulmonary/Chest: Effort normal. No stridor. No respiratory distress. He has no wheezes.   Musculoskeletal: Normal range of motion.   Lymphadenopathy:        Head (right side): No submental, no submandibular, no tonsillar, no preauricular and no posterior auricular adenopathy present.        Head (left side): No submental, no submandibular, no tonsillar, no preauricular and no posterior auricular adenopathy present.     He has no cervical adenopathy.        Right cervical: No superficial cervical and no posterior cervical adenopathy present.       Left cervical: No superficial cervical and no posterior cervical adenopathy present.   Neurological: He is alert and oriented to person, place, and time. He has normal strength. Coordination and gait normal.   Skin: Skin is warm, dry and intact. No lesion and no rash noted. He is not diaphoretic. No cyanosis. No pallor.   Psychiatric: He has a normal mood and affect. His speech is normal and behavior is normal. Judgment and thought content normal. Cognition and memory are normal.   Nursing note and vitals reviewed.      Assessment:       1. Severe frontal headaches        Plan:     CT sinus. Will call with results as soon as available. If positive, will treat accordingly. If negative, see Dr. Lehman or Dr. Sams.    Discussed typical constellation of symptoms seen with acute allergic rhinitis exacerbation, acute bacterial sinusitis, and LPRD.   Return to clinic if symptoms worsen/persist and as  needed for further ENT concerns

## 2017-08-16 ENCOUNTER — TELEPHONE (OUTPATIENT)
Dept: OTOLARYNGOLOGY | Facility: CLINIC | Age: 72
End: 2017-08-16

## 2017-08-16 ENCOUNTER — HOSPITAL ENCOUNTER (OUTPATIENT)
Dept: RADIOLOGY | Facility: HOSPITAL | Age: 72
Discharge: HOME | End: 2017-08-16
Attending: NURSE PRACTITIONER
Payer: MEDICARE

## 2017-08-16 DIAGNOSIS — R51.9 SEVERE FRONTAL HEADACHES: ICD-10-CM

## 2017-08-16 PROCEDURE — 70486 CT MAXILLOFACIAL W/O DYE: CPT | Mod: TC,PO

## 2017-08-16 PROCEDURE — 70486 CT MAXILLOFACIAL W/O DYE: CPT | Mod: 26,,, | Performed by: RADIOLOGY

## 2017-08-16 NOTE — TELEPHONE ENCOUNTER
----- Message from Florina Graves NP sent at 8/16/2017  3:41 PM CDT -----  Dr. Hernandez -- this patient was seen for severe frontal headaches interfering with his ability to function normally at times. CT sinus was obtained today. Please review and advise whether patient would possibly benefit from balloon sinuplasty. Will also refer him to neurology for evaluation of headaches, but want to make sure cleared by ENT prior to seeing neuro.

## 2017-08-17 NOTE — TELEPHONE ENCOUNTER
----- Message from Florina Graves NP sent at 8/17/2017  7:52 AM CDT -----  Patient does not have sinus disease causing his headache pain. He should see either Dr. Lehman or Dr. Sams to evaluate his headaches.     Thank you,  Florina    ----- Message -----  From: Dick Hernandez MD  Sent: 8/17/2017   7:19 AM  To: ROBYN Zeng,    I reviewed his scan and note. Despite the report, his sinus disease is minimal. He does have some mild opacification in his ethmoids, but these are not addressable with sinuplasty. The small retention cysts in his maxillary sinuses are certainly not contributing to his symptoms, and the amount of disease in the other sinuses is minimal. He doesn't appear to have a contact point from the septum or turbinate that can sometimes cause facial pressure.     I definitely agree with Neurology evaluation. I highly suspect this is a headache disorder. In the absence of >2 sinonasal symptoms (nasal obstruction, drainage, decreased smell, recurrent infections, etc... For > 3 months) I would not offer sinus surgery with a scan like that and the degree of headache symptoms he is having, much less sinuplasty. Very low that I would help him. If nasal symptoms start acting up for a long period, it's a different story - but I still would never promise to cure the headache. I'm happy to see him and take a look in his nose to make sure I'm not missing something, but I do not think he would benefit from surgery.    Dick    ----- Message -----  From: Florina Graves NP  Sent: 8/16/2017   4:03 PM  To: MD Dr. Mary Mayer -- this patient was seen for severe frontal headaches interfering with his ability to function normally at times. CT sinus was obtained today. Please review and advise whether patient would possibly benefit from balloon sinuplasty. Will also refer him to neurology for evaluation of headaches, but want to make sure cleared by ENT prior to seeing neuro.

## 2017-09-12 PROBLEM — M26.609 TMJ DYSFUNCTION: Status: ACTIVE | Noted: 2017-09-12

## 2018-02-20 ENCOUNTER — OFFICE VISIT (OUTPATIENT)
Dept: DERMATOLOGY | Facility: CLINIC | Age: 73
End: 2018-02-20
Payer: MEDICARE

## 2018-02-20 VITALS — BODY MASS INDEX: 28.36 KG/M2 | HEIGHT: 73 IN | WEIGHT: 214 LBS

## 2018-02-20 DIAGNOSIS — Z85.828 PERSONAL HISTORY OF OTHER MALIGNANT NEOPLASM OF SKIN: Primary | ICD-10-CM

## 2018-02-20 DIAGNOSIS — Z12.83 SKIN CANCER SCREENING: ICD-10-CM

## 2018-02-20 DIAGNOSIS — D48.5 NEOPLASM OF UNCERTAIN BEHAVIOR OF SKIN: ICD-10-CM

## 2018-02-20 DIAGNOSIS — L57.0 MULTIPLE ACTINIC KERATOSES: ICD-10-CM

## 2018-02-20 PROCEDURE — 1159F MED LIST DOCD IN RCRD: CPT | Mod: ,,, | Performed by: DERMATOLOGY

## 2018-02-20 PROCEDURE — 1126F AMNT PAIN NOTED NONE PRSNT: CPT | Mod: ,,, | Performed by: DERMATOLOGY

## 2018-02-20 PROCEDURE — 99213 OFFICE O/P EST LOW 20 MIN: CPT | Mod: PBBFAC,PO | Performed by: DERMATOLOGY

## 2018-02-20 PROCEDURE — 17000 DESTRUCT PREMALG LESION: CPT | Mod: S$PBB,,, | Performed by: DERMATOLOGY

## 2018-02-20 PROCEDURE — 88305 TISSUE EXAM BY PATHOLOGIST: CPT | Performed by: PATHOLOGY

## 2018-02-20 PROCEDURE — 17000 DESTRUCT PREMALG LESION: CPT | Mod: PBBFAC,PO | Performed by: DERMATOLOGY

## 2018-02-20 PROCEDURE — 99999 PR PBB SHADOW E&M-EST. PATIENT-LVL III: CPT | Mod: PBBFAC,,, | Performed by: DERMATOLOGY

## 2018-02-20 PROCEDURE — 11100 PR BIOPSY OF SKIN LESION: CPT | Mod: 59,S$PBB,, | Performed by: DERMATOLOGY

## 2018-02-20 PROCEDURE — 17003 DESTRUCT PREMALG LES 2-14: CPT | Mod: S$PBB,,, | Performed by: DERMATOLOGY

## 2018-02-20 PROCEDURE — 11100 PR BIOPSY OF SKIN LESION: CPT | Mod: 59,PBBFAC,PO | Performed by: DERMATOLOGY

## 2018-02-20 PROCEDURE — 17003 DESTRUCT PREMALG LES 2-14: CPT | Mod: PBBFAC,PO | Performed by: DERMATOLOGY

## 2018-02-20 PROCEDURE — 99202 OFFICE O/P NEW SF 15 MIN: CPT | Mod: 25,S$PBB,, | Performed by: DERMATOLOGY

## 2018-02-20 NOTE — PROGRESS NOTES
Subjective:       Patient ID:  Eligio Richardson is a 73 y.o. male who presents for   Chief Complaint   Patient presents with    Skin Check    Lesion     Patient here for initial visit skin check. Last seen by Dr. 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 Negative for rash, daily sunscreen use and recent sunburn.        Objective:    Physical Exam   Constitutional: He appears well-developed and well-nourished. No distress.   HENT:   Head:       Mouth/Throat: Lips normal.    Eyes: Lids are normal.  No conjunctival no injection.   Neurological: He is alert and oriented to person, place, and time. He is not disoriented.   Psychiatric: He has a normal mood and affect.   Skin:   Areas Examined (abnormalities noted in diagram):   Head / Face Inspection Performed  Neck Inspection Performed  Chest / Axilla Inspection Performed  Back Inspection Performed  RUE Inspected  LUE Inspection Performed                   Diagram Legend     Erythematous scaling macule/papule c/w actinic keratosis       Vascular papule c/w angioma      Pigmented verrucoid papule/plaque c/w seborrheic keratosis      Yellow umbilicated papule c/w sebaceous hyperplasia      Irregularly shaped tan macule c/w lentigo     1-2 mm smooth white papules consistent with Milia      Movable subcutaneous cyst with punctum c/w epidermal inclusion cyst      Subcutaneous movable cyst c/w pilar cyst      Firm pink to brown papule c/w dermatofibroma      Pedunculated fleshy papule(s) c/w skin tag(s)      Evenly pigmented macule c/w junctional nevus     Mildly variegated pigmented, slightly  irregular-bordered macule c/w mildly atypical nevus      Flesh colored to evenly pigmented papule c/w intradermal nevus       Pink pearly papule/plaque c/w basal cell carcinoma      Erythematous hyperkeratotic cursted plaque c/w SCC      Surgical scar with no sign of skin cancer recurrence      Open and closed comedones      Inflammatory papules and pustules      Verrucoid papule consistent consistent with wart     Erythematous eczematous patches and plaques     Dystrophic onycholytic nail with subungual debris c/w onychomycosis     Umbilicated papule    Erythematous-base heme-crusted tan verrucoid plaque consistent with inflamed seborrheic keratosis     Erythematous Silvery Scaling Plaque c/w Psoriasis     See annotation      Assessment / Plan:      Pathology Orders:     Normal Orders This Visit    Tissue Specimen To Pathology, Dermatology     Questions:    Directional Terms:  Other(comment)    Clinical information:  bcc vs ak vs other    Specific Site:  left cheek        Personal history of other malignant neoplasm of skin  Area(s) of previous NMSC evaluated with no signs of recurrence.    Upper body skin examination performed today including at least 6 points as noted in physical examination. Suspicious lesions noted.      Skin cancer screening  Area(s) of previous NMSC evaluated with no signs of recurrence.    Upper body skin examination performed today including at least 6 points as noted in physical examination. Suspicious lesions noted.      Multiple actinic keratoses  Cryosurgery Procedure Note    Verbal consent from the patient is obtained and the patient is aware of the precancerous quality and need for treatment of these lesions. Liquid nitrogen cryosurgery is applied to the 11 actinic keratoses, as detailed in the physical exam, to produce a freeze injury. The patient is aware that blisters may form and is instructed on wound care with gentle cleansing and use of vaseline ointment to keep moist until  healed. The patient is supplied a handout on cryosurgery and is instructed to call if lesions do not completely resolve. Discussed risk postinflammatory pigmentary changes.     Neoplasm of uncertain behavior of skin  -     Tissue Specimen To Pathology, Dermatology    Shave biopsy procedure note:    Shave biopsy performed after verbal consent including risk of infection, scar, recurrence, need for additional treatment of site. Area prepped with alcohol, anesthetized with approximately 1.0cc of 1% lidocaine with epinephrine. Lesional tissue shaved with razor blade. Hemostasis achieved with application of aluminum chloride followed by hyfrecation. No complications. Dressing applied. Wound care explained.                 Follow-up in about 6 months (around 8/20/2018).

## 2018-02-28 ENCOUNTER — TELEPHONE (OUTPATIENT)
Dept: DERMATOLOGY | Facility: CLINIC | Age: 73
End: 2018-02-28

## 2018-02-28 NOTE — TELEPHONE ENCOUNTER
"----- Message from Avis Berumen MD sent at 2/27/2018  7:40 AM CST -----  Please call with results. Lesion on cheek was a precancer. Allow to heal from biopsy, f/u with me in 6-8 weeks to ensure resolved.     FINAL PATHOLOGIC DIAGNOSIS  1. Skin, left cheek, shave biopsy:  - INFLAMED ACTINIC KERATOSIS.  MICROSCOPIC DESCRIPTION: Sections show atypia and maturation disarray within the lowermost epidermal  layers associated with hyper- and parakeratosis and solar elastosis. An inflammatory infiltrate is additionally seen.  Multiple levels were examined.  Diagnosed by: Mj Olson M.D.  (Electronically Signed: 2018-02-26 12:33:58)  Gross Description  C # [id number]  Received in formalin in the specimen container labeled with the patient's name, "left cheek", is a tan soft tissue  fragment measuring 0.3 x 0.2 x 0.1 cm. The specimen is grossly unremarkable. The possible resection margin is  inked blue. The specimen is entirely submitted in one cassette.  Giuliana Mayes  Page 1  "

## 2018-03-19 DIAGNOSIS — N40.0 BENIGN NON-NODULAR PROSTATIC HYPERPLASIA WITHOUT LOWER URINARY TRACT SYMPTOMS: ICD-10-CM

## 2018-03-19 RX ORDER — TAMSULOSIN HYDROCHLORIDE 0.4 MG/1
CAPSULE ORAL
Qty: 30 CAPSULE | Refills: 11 | Status: SHIPPED | OUTPATIENT
Start: 2018-03-19 | End: 2019-03-20 | Stop reason: SDUPTHER

## 2018-05-10 ENCOUNTER — TELEPHONE (OUTPATIENT)
Dept: DERMATOLOGY | Facility: CLINIC | Age: 73
End: 2018-05-10

## 2018-05-10 NOTE — TELEPHONE ENCOUNTER
Pt contacted & appointment scheduled.     ----- Message from Alexander Ma sent at 5/10/2018 10:53 AM CDT -----  Contact: wife- Annie [phone]  Type:  Patient Returning Call    Who Called:  Micah Valencia   Who Left Message for Patient:  Nurse -  Does the patient know what this is regarding?:  No Best Call Back Number:  [phone] Additional Information:  Patient returning the nurse phone call.

## 2018-06-06 ENCOUNTER — TELEPHONE (OUTPATIENT)
Dept: UROLOGY | Facility: CLINIC | Age: 73
End: 2018-06-06

## 2018-06-06 ENCOUNTER — LAB VISIT (OUTPATIENT)
Dept: LAB | Facility: HOSPITAL | Age: 73
End: 2018-06-06
Attending: UROLOGY
Payer: MEDICARE

## 2018-06-06 ENCOUNTER — OFFICE VISIT (OUTPATIENT)
Dept: UROLOGY | Facility: CLINIC | Age: 73
End: 2018-06-06
Payer: MEDICARE

## 2018-06-06 VITALS
SYSTOLIC BLOOD PRESSURE: 130 MMHG | BODY MASS INDEX: 28.98 KG/M2 | WEIGHT: 218.69 LBS | HEIGHT: 73 IN | DIASTOLIC BLOOD PRESSURE: 84 MMHG | HEART RATE: 64 BPM

## 2018-06-06 DIAGNOSIS — R97.20 ELEVATED PSA: ICD-10-CM

## 2018-06-06 DIAGNOSIS — N40.0 BENIGN PROSTATIC HYPERPLASIA, UNSPECIFIED WHETHER LOWER URINARY TRACT SYMPTOMS PRESENT: Primary | ICD-10-CM

## 2018-06-06 DIAGNOSIS — N32.81 OAB (OVERACTIVE BLADDER): ICD-10-CM

## 2018-06-06 LAB
BILIRUB SERPL-MCNC: NORMAL MG/DL
BLOOD URINE, POC: NORMAL
COLOR, POC UA: YELLOW
COMPLEXED PSA SERPL-MCNC: 6.8 NG/ML
GLUCOSE UR QL STRIP: NORMAL
KETONES UR QL STRIP: NORMAL
LEUKOCYTE ESTERASE URINE, POC: NORMAL
NITRITE, POC UA: NORMAL
PH, POC UA: 5.5
PROTEIN, POC: NORMAL
SPECIFIC GRAVITY, POC UA: 1.02
UROBILINOGEN, POC UA: NORMAL

## 2018-06-06 PROCEDURE — 36415 COLL VENOUS BLD VENIPUNCTURE: CPT | Mod: PO

## 2018-06-06 PROCEDURE — 99213 OFFICE O/P EST LOW 20 MIN: CPT | Mod: PBBFAC,PO | Performed by: UROLOGY

## 2018-06-06 PROCEDURE — 81002 URINALYSIS NONAUTO W/O SCOPE: CPT | Mod: PBBFAC,PO | Performed by: UROLOGY

## 2018-06-06 PROCEDURE — 84153 ASSAY OF PSA TOTAL: CPT

## 2018-06-06 PROCEDURE — 99999 PR PBB SHADOW E&M-EST. PATIENT-LVL III: CPT | Mod: PBBFAC,,, | Performed by: UROLOGY

## 2018-06-06 PROCEDURE — 99214 OFFICE O/P EST MOD 30 MIN: CPT | Mod: S$PBB,,, | Performed by: UROLOGY

## 2018-06-06 RX ORDER — GABAPENTIN 300 MG/1
CAPSULE ORAL
Refills: 3 | COMMUNITY
Start: 2018-04-11 | End: 2018-10-19 | Stop reason: SDUPTHER

## 2018-06-06 RX ORDER — MELOXICAM 15 MG/1
15 TABLET ORAL DAILY
Refills: 3 | COMMUNITY
Start: 2018-05-16 | End: 2019-05-31 | Stop reason: SDUPTHER

## 2018-06-06 NOTE — TELEPHONE ENCOUNTER
----- Message from RT Jak sent at 6/6/2018 12:48 PM CDT -----  Contact: Annie,wife,[phone]   Annie,wife,[phone], requesting a call back soon she have questions concerning the pt's medication directions, thanks.

## 2018-06-06 NOTE — PROGRESS NOTES
UROLOGY [place]  6 6 18    c-c urinary frequency and urgency    Age 73. Says he is having urgency and at times leaks on the way to the bathroom. He is already on flomax for bph and on ditropan xl 10 daily for urinary frequency and urgency. noctura x 2-3 on bad night, x 1 on good night.     Also being followed for elevated psa of 5.7. The rectal exam has not shown any definite indurations or asymmetry of his prostate gland, but pt is worried. Last year he had a prostate biopsy in 2017 and it was negative for malignancy.     Pt has a family hx of cancer, and a maternal uncle with prostate cancer.         PMH      Surgical: has past surgical history that includes Shoulder surgery; Tonsillectomy; and Elbow Arthroplasty.      Medical: has a past medical history of Constipation, chronic; Hypothyroidism; Irregular heart beat; Hiatal hernia; and Inguinal hernia without mention of obstruction or gangrene, unilateral or unspecified, (not specified as recurrent).      Familial: both parents  of lung cancer      Social: , retired from the [employer]; has a small farm.                  Current Outpatient Prescriptions on File Prior to Visit   Medication Sig Dispense Refill    aluminum hydrox-magnesium carb (GAV Take by mouth continuous prn.           aspirin (STANBACK HEADACHE POWDER) Take 650 mg by mouth as needed.           azelastine (ASTELIN) 137 mcg nasal spray 1 spray by Nasal route 2 (two) [place]          fluticasone (FLONASE) 50 mcg/actuation nasal spray 1 spray by Each Nare route once daily.          levothyroxine (SYNTHROID) 88 MCG tablet Take 88 mcg by mouth once daily.          linaclotide (LINZESS) 145 mcg Cap Take 145 mcg by mouth once daily.          MULTIVITAMIN W-MINERALS/MILES Take by mouth continuous prn.           omeprazole (PRILOSEC) 20 MG capsule Take 20 mg by mouth once daily.             ROS:  Denies malaise, headaches, eye symptoms, difficulty swallowing or breathing  problems.   No chest pains or palpitations.   No change in bowel habits, no tarry stools or hematochezia. Has acid reflux.  No genital lesions.  No bleeding disorders, no seizures.  pscych normal affect, normal mood      Pt alert, oriented, no distress  HEENT: s wnl.  Neck: supple, no JVD, no lymphadenopathy  Chest: CV NSR  Lungs: normal chest expansion  Abdomen flat, nontender, no organomegaly, no masses.  No hernias  Penis nl, meatus nl  Testes noncircumcised; redundant prepuce is present, but slides well, epi nl, scrotum nl  ASHOK: anus nl, sphincter nl tone, mucosa without lesions, prostate 40 gm, symmetric, no nodules or indurations  Extremities: no edema, peripheral pulses nl  Neuro: preserved      IMP  Elevated psa  bph on flomax  Overactive bladder, on ditropan 10 xl, can stay on it.  I can add myrbetriq 25 daily  Eventually may need cystoscopy to observe the bladder neck and see if pt is a candidate for turp  Will check psa and see what level he has now

## 2018-06-11 ENCOUNTER — TELEPHONE (OUTPATIENT)
Dept: UROLOGY | Facility: CLINIC | Age: 73
End: 2018-06-11

## 2018-06-11 DIAGNOSIS — Z79.2 PROPHYLACTIC ANTIBIOTIC: Primary | ICD-10-CM

## 2018-06-11 DIAGNOSIS — N40.1 BENIGN PROSTATIC HYPERPLASIA WITH LOWER URINARY TRACT SYMPTOMS, SYMPTOM DETAILS UNSPECIFIED: Primary | ICD-10-CM

## 2018-06-11 DIAGNOSIS — R97.20 ELEVATED PSA: ICD-10-CM

## 2018-06-11 RX ORDER — CIPROFLOXACIN 500 MG/1
500 TABLET ORAL 2 TIMES DAILY
Qty: 6 TABLET | Refills: 0 | Status: SHIPPED | OUTPATIENT
Start: 2018-06-17 | End: 2018-06-20

## 2018-06-11 NOTE — TELEPHONE ENCOUNTER
Patient scheduled for CYsto and Prostate biopsy on 6/18/18.  Written instructions mailed to patient and reviewed via telephone.  Cipro sent to MD to send to pharmacy.

## 2018-06-11 NOTE — TELEPHONE ENCOUNTER
----- Message from Char Gu LPN sent at 6/8/2018  3:24 PM CDT -----      ----- Message -----  From: Andry Paz MD  Sent: 6/7/2018   5:15 PM  To: [facility] Staff    Schedule cysto and prostate biopsy in ASC.

## 2018-06-15 ENCOUNTER — ANESTHESIA EVENT (OUTPATIENT)
Dept: SURGERY | Facility: HOSPITAL | Age: 73
End: 2018-06-15
Payer: MEDICARE

## 2018-06-18 ENCOUNTER — SURGERY (OUTPATIENT)
Age: 73
End: 2018-06-18

## 2018-06-18 ENCOUNTER — HOSPITAL ENCOUNTER (OUTPATIENT)
Facility: HOSPITAL | Age: 73
Discharge: HOME | End: 2018-06-18
Attending: UROLOGY | Admitting: UROLOGY
Payer: MEDICARE

## 2018-06-18 ENCOUNTER — ANESTHESIA (OUTPATIENT)
Dept: SURGERY | Facility: HOSPITAL | Age: 73
End: 2018-06-18
Payer: MEDICARE

## 2018-06-18 VITALS
RESPIRATION RATE: 18 BRPM | TEMPERATURE: 98 F | BODY MASS INDEX: 28.89 KG/M2 | SYSTOLIC BLOOD PRESSURE: 139 MMHG | WEIGHT: 218 LBS | DIASTOLIC BLOOD PRESSURE: 81 MMHG | HEIGHT: 73 IN | HEART RATE: 66 BPM | OXYGEN SATURATION: 99 %

## 2018-06-18 DIAGNOSIS — N40.1 BENIGN PROSTATIC HYPERPLASIA WITH LOWER URINARY TRACT SYMPTOMS, SYMPTOM DETAILS UNSPECIFIED: ICD-10-CM

## 2018-06-18 DIAGNOSIS — N40.1 BENIGN PROSTATIC HYPERPLASIA WITH URINARY OBSTRUCTION: Primary | ICD-10-CM

## 2018-06-18 DIAGNOSIS — R97.20 ELEVATED PSA: ICD-10-CM

## 2018-06-18 DIAGNOSIS — N13.8 BENIGN PROSTATIC HYPERPLASIA WITH URINARY OBSTRUCTION: Primary | ICD-10-CM

## 2018-06-18 PROCEDURE — 76942 ECHO GUIDE FOR BIOPSY: CPT | Mod: 26,59,, | Performed by: UROLOGY

## 2018-06-18 PROCEDURE — 36000707: Mod: PO | Performed by: UROLOGY

## 2018-06-18 PROCEDURE — 71000033 HC RECOVERY, INTIAL HOUR: Mod: PO | Performed by: UROLOGY

## 2018-06-18 PROCEDURE — 63600175 PHARM REV CODE 636 W HCPCS: Mod: PO | Performed by: NURSE ANESTHETIST, CERTIFIED REGISTERED

## 2018-06-18 PROCEDURE — G0416 PROSTATE BIOPSY, ANY MTHD: HCPCS | Performed by: PATHOLOGY

## 2018-06-18 PROCEDURE — 88341 IMHCHEM/IMCYTCHM EA ADD ANTB: CPT | Mod: 26,,, | Performed by: PATHOLOGY

## 2018-06-18 PROCEDURE — 88305 TISSUE EXAM BY PATHOLOGIST: CPT | Mod: 26,,, | Performed by: PATHOLOGY

## 2018-06-18 PROCEDURE — 36000706: Mod: PO | Performed by: UROLOGY

## 2018-06-18 PROCEDURE — 55700 PR BIOPSY OF PROSTATE,NEEDLE/PUNCH: CPT | Mod: ,,, | Performed by: UROLOGY

## 2018-06-18 PROCEDURE — 37000008 HC ANESTHESIA 1ST 15 MINUTES: Mod: PO | Performed by: UROLOGY

## 2018-06-18 PROCEDURE — 76872 US TRANSRECTAL: CPT | Mod: 26,,, | Performed by: UROLOGY

## 2018-06-18 PROCEDURE — 88342 IMHCHEM/IMCYTCHM 1ST ANTB: CPT | Mod: 26,,, | Performed by: PATHOLOGY

## 2018-06-18 PROCEDURE — D9220A PRA ANESTHESIA: Mod: CRNA,,, | Performed by: NURSE ANESTHETIST, CERTIFIED REGISTERED

## 2018-06-18 PROCEDURE — 25000003 PHARM REV CODE 250: Mod: PO | Performed by: NURSE ANESTHETIST, CERTIFIED REGISTERED

## 2018-06-18 PROCEDURE — 37000009 HC ANESTHESIA EA ADD 15 MINS: Mod: PO | Performed by: UROLOGY

## 2018-06-18 PROCEDURE — 25000003 PHARM REV CODE 250: Mod: PO | Performed by: UROLOGY

## 2018-06-18 PROCEDURE — D9220A PRA ANESTHESIA: Mod: ANES,,, | Performed by: ANESTHESIOLOGY

## 2018-06-18 PROCEDURE — 25000003 PHARM REV CODE 250: Mod: PO | Performed by: ANESTHESIOLOGY

## 2018-06-18 PROCEDURE — 88305 TISSUE EXAM BY PATHOLOGIST: CPT | Mod: 59 | Performed by: PATHOLOGY

## 2018-06-18 PROCEDURE — 52000 CYSTOURETHROSCOPY: CPT | Mod: 59,,, | Performed by: UROLOGY

## 2018-06-18 RX ORDER — LIDOCAINE HCL/PF 100 MG/5ML
SYRINGE (ML) INTRAVENOUS
Status: DISCONTINUED | OUTPATIENT
Start: 2018-06-18 | End: 2018-06-18

## 2018-06-18 RX ORDER — MIDAZOLAM HYDROCHLORIDE 1 MG/ML
INJECTION, SOLUTION INTRAMUSCULAR; INTRAVENOUS
Status: DISCONTINUED | OUTPATIENT
Start: 2018-06-18 | End: 2018-06-18

## 2018-06-18 RX ORDER — KETAMINE HYDROCHLORIDE 100 MG/ML
INJECTION, SOLUTION INTRAMUSCULAR; INTRAVENOUS
Status: DISCONTINUED | OUTPATIENT
Start: 2018-06-18 | End: 2018-06-18

## 2018-06-18 RX ORDER — SODIUM CHLORIDE, SODIUM LACTATE, POTASSIUM CHLORIDE, CALCIUM CHLORIDE 600; 310; 30; 20 MG/100ML; MG/100ML; MG/100ML; MG/100ML
INJECTION, SOLUTION INTRAVENOUS CONTINUOUS
Status: DISCONTINUED | OUTPATIENT
Start: 2018-06-18 | End: 2018-06-18 | Stop reason: HOSPADM

## 2018-06-18 RX ORDER — FENTANYL CITRATE 50 UG/ML
INJECTION, SOLUTION INTRAMUSCULAR; INTRAVENOUS
Status: DISCONTINUED | OUTPATIENT
Start: 2018-06-18 | End: 2018-06-18

## 2018-06-18 RX ORDER — ACETAMINOPHEN 325 MG/1
325 TABLET ORAL EVERY 4 HOURS PRN
Status: DISCONTINUED | OUTPATIENT
Start: 2018-06-18 | End: 2018-06-18 | Stop reason: HOSPADM

## 2018-06-18 RX ORDER — FENTANYL CITRATE 50 UG/ML
25 INJECTION, SOLUTION INTRAMUSCULAR; INTRAVENOUS EVERY 5 MIN PRN
Status: DISCONTINUED | OUTPATIENT
Start: 2018-06-18 | End: 2018-06-18 | Stop reason: HOSPADM

## 2018-06-18 RX ORDER — LIDOCAINE HYDROCHLORIDE 10 MG/ML
INJECTION INFILTRATION; PERINEURAL
Status: DISCONTINUED | OUTPATIENT
Start: 2018-06-18 | End: 2018-06-18 | Stop reason: HOSPADM

## 2018-06-18 RX ORDER — PROPOFOL 10 MG/ML
VIAL (ML) INTRAVENOUS CONTINUOUS PRN
Status: DISCONTINUED | OUTPATIENT
Start: 2018-06-18 | End: 2018-06-18

## 2018-06-18 RX ORDER — SODIUM CHLORIDE 0.9 % (FLUSH) 0.9 %
3 SYRINGE (ML) INJECTION
Status: DISCONTINUED | OUTPATIENT
Start: 2018-06-18 | End: 2018-06-18 | Stop reason: HOSPADM

## 2018-06-18 RX ORDER — HYDROCODONE BITARTRATE AND ACETAMINOPHEN 5; 325 MG/1; MG/1
1 TABLET ORAL EVERY 4 HOURS PRN
Status: DISCONTINUED | OUTPATIENT
Start: 2018-06-18 | End: 2018-06-18 | Stop reason: HOSPADM

## 2018-06-18 RX ORDER — OXYCODONE HYDROCHLORIDE 5 MG/1
5 TABLET ORAL ONCE AS NEEDED
Status: DISCONTINUED | OUTPATIENT
Start: 2018-06-18 | End: 2018-06-18 | Stop reason: HOSPADM

## 2018-06-18 RX ORDER — LIDOCAINE HYDROCHLORIDE 10 MG/ML
0.5 INJECTION, SOLUTION EPIDURAL; INFILTRATION; INTRACAUDAL; PERINEURAL ONCE AS NEEDED
Status: DISCONTINUED | OUTPATIENT
Start: 2018-06-18 | End: 2018-06-18 | Stop reason: HOSPADM

## 2018-06-18 RX ADMIN — FENTANYL CITRATE 50 MCG: 50 INJECTION, SOLUTION INTRAMUSCULAR; INTRAVENOUS at 11:06

## 2018-06-18 RX ADMIN — PROPOFOL 50 MCG/KG/MIN: 10 INJECTION, EMULSION INTRAVENOUS at 11:06

## 2018-06-18 RX ADMIN — LIDOCAINE HYDROCHLORIDE 100 MG: 20 INJECTION PARENTERAL at 11:06

## 2018-06-18 RX ADMIN — SODIUM CHLORIDE, SODIUM LACTATE, POTASSIUM CHLORIDE, AND CALCIUM CHLORIDE: .6; .31; .03; .02 INJECTION, SOLUTION INTRAVENOUS at 09:06

## 2018-06-18 RX ADMIN — MIDAZOLAM HYDROCHLORIDE 1 MG: 1 INJECTION, SOLUTION INTRAMUSCULAR; INTRAVENOUS at 11:06

## 2018-06-18 RX ADMIN — KETAMINE HYDROCHLORIDE 25 MG: 100 INJECTION, SOLUTION, CONCENTRATE INTRAMUSCULAR; INTRAVENOUS at 11:06

## 2018-06-18 RX ADMIN — LIDOCAINE HYDROCHLORIDE 10 ML: 10 INJECTION, SOLUTION INFILTRATION; PERINEURAL at 11:06

## 2018-06-18 NOTE — TRANSFER OF CARE
"Anesthesia Transfer of Care Note    Patient: Eligio Richardson    Procedure(s) Performed: Procedure(s) (LRB):  CYSTOSCOPY (N/A)  BIOPSY, PROSTATE, TRANSRECTAL APPROACH, WITH US GUIDANCE (N/A)    Patient location: PACU    Anesthesia Type: MAC    Transport from OR: Transported from OR on room air with adequate spontaneous ventilation    Post pain: adequate analgesia    Post assessment: no apparent anesthetic complications    Post vital signs: stable    Level of consciousness: awake    Nausea/Vomiting: no nausea/vomiting    Complications: none    Transfer of care protocol was followed      Last vitals:   Visit Vitals  BP (!) 152/83 (BP Location: Right arm, Patient Position: Lying)   Pulse 66   Temp 36.7 °C (98.1 °F) (Skin)   Resp 18   Ht 6' 1" (1.854 m)   Wt 98.9 kg (218 lb)   SpO2 96%   BMI 28.76 kg/m²     "

## 2018-06-18 NOTE — OP NOTE
Site: [facility], [place]  Date 6 18 18  Surgeon Brandi  Anesthesia: iv sedation and local infiltration xylocaine  Preop dx difficulty voiding, bph, elevated psa, family hx of prostate cancer  Postop dx same  Procedure Cystoscopy, TRANSRECTAL ULTRASOUND OF PROSTATE WITH NEEDLE BIOPSIES. ULTRASOUND GUIDANCE FOR NEEDLE BIOPSIES  Complications None    DESCRIPTION OF PROCEDURE     Patient took a prophylactic antibiotic starting last night. He also gave himself a Fleet enema 2 hours prior to the procedure. Pt brought into the OR and placed under satisfactory anesthesia. He was left in the supine position for cystoscopy. The genital area was scrubbed, prepped and draped. We lubricated the urethra with ky jelly. The flexible olympus cystoscope was inserted in the bladder without difficulty. The anterior urethra was free of obstruction; posterior urethra showed signs of prostatic overgrowth, with lateral lobes contacting in the midline. Once in the bladder, the bladder cavity distended equally in all directions. There were no bladder mucosal lesions of any kind, such as ulcerations, exophytic growths or suspicious erythematous plaques. Trigone had normal anatomy. The scope was retroflexed and we were able to inspect the floor of the bladder. The prostate was found to be prominent when visualized from this angle as well, especially the posterior lobe. The posterior lobe is expected to be markedly obstructing during voiding. At this point we had finished our bladder inspection and the scope was withdrawn.     Pt was then placed in the L lateral decubitus position. We instilled a 10-ml enema of pure Betadine solution in the rectal ampulla and waited a few minutes.   The rectal probe of the [employer] S-nerve ultrasound machine with 5-to-8 Hz frequency was inserted and multiple transverse and longitudinal scans were done.     Prostate measurements:  Gland volume: 47.1    cm3  Gland width (transverse):  5.14 cm  Gland height (anteroposterior): 3.60 cm  Gland length (cephalocaudal): 4.86 cm      The general shape of the prostate was symmetric. Several small calcific densities were seen, as well as various small cystic lesions. No definite hypoechoic areas were seen.   We performed a prostate block (pudendal block) with a total of 10 ml of xylocaine applied at the prostate base and prostate apex on both sides. Multiple biopsies were obtained using the Corrigoum Biopsy Needle in the 22 mm length setting. We used the sextant topographic technique to distribute and send the biopsy samples. The patient tolerated the procedure well. He was transferred to Recovery Room.    Patient was warned about possible complications, such as persistent hematuria, hematochezia, hematospermia, infection and sepsis. He is to continue with the antibiotic given until finished. Drink abundant fluids. Call in one week for pathology results

## 2018-06-18 NOTE — ANESTHESIA POSTPROCEDURE EVALUATION
"Anesthesia Post Evaluation    Patient: Eligio Richardson    Procedure(s) Performed: Procedure(s) (LRB):  CYSTOSCOPY (N/A)  BIOPSY, PROSTATE, TRANSRECTAL APPROACH, WITH US GUIDANCE (N/A)    Final Anesthesia Type: MAC  Patient location during evaluation: PACU  Patient participation: Yes- Able to Participate  Level of consciousness: awake and alert  Post-procedure vital signs: reviewed and stable  Pain management: adequate  Airway patency: patent  PONV status at discharge: No PONV  Anesthetic complications: no      Cardiovascular status: blood pressure returned to baseline and hemodynamically stable  Respiratory status: unassisted, spontaneous ventilation and room air  Hydration status: euvolemic  Follow-up not needed.        Visit Vitals  /81 (BP Location: Left arm, Patient Position: Sitting)   Pulse 66   Temp 36.7 °C (98 °F) (Skin)   Resp 18   Ht 6' 1" (1.854 m)   Wt 98.9 kg (218 lb)   SpO2 99%   BMI 28.76 kg/m²       Pain/Mellissa Score: Pain Assessment Performed: Yes (6/18/2018 12:41 PM)  Presence of Pain: denies (6/18/2018 12:41 PM)  Mellissa Score: 10 (6/18/2018 12:41 PM)      "

## 2018-06-18 NOTE — H&P
ASC for elective procedure  Urology 6 18 18      c-c urinary frequency and urgency, elevated psa      Age 73. psa is 6.8, was 5.7 last year, and 4.2 the year before. Has family hx of prostate cancer. His rectal examination recently in the office did now show palpable prostate gland abnormalities. Last year he had a prostate biopsy in 2017 and it was negative for malignancy.     Pt has urinary frequency and urgency. At times small urge incontinence.     He is on flomax for bph and on ditropan xl 10 daily for urinary frequency and urgency. nocturia x 2-3 on bad night, x 1 on good night.      Pt has a family hx of cancer, and a maternal uncle with prostate cancer.         PMH      Surgical: has past surgical history that includes Shoulder surgery; Tonsillectomy; and Elbow Arthroplasty.      Medical: has a past medical history of Constipation, chronic; Hypothyroidism; Irregular heart beat; Hiatal hernia; and Inguinal hernia without mention of obstruction or gangrene, unilateral or unspecified, (not specified as recurrent).      Familial: both parents  of lung cancer      Social: , retired from the [employer]; has a small farm.                  Current Outpatient Prescriptions on File Prior to Visit   Medication Sig Dispense Refill    aluminum hydrox-magnesium c Take by mouth          aspirin (STANBACK HEADAC Take 650 mg by mouth           azelastine (ASTELIN) 137 mcg 1 spray by Nasal route           fluticasone (FLONASE) 50 mcg 1 spray by Each Nare          levothyroxine (SYNTHROID)  Take 88 mcg by mouth          linaclotide (LINZESS) 145 mcg Cap Take 145 mcg          MULTIVITAMIN W-MINERALS/MILES Take by sugey          omeprazole (PRILOSEC) 20 MG Take 20 mg by mo               ROS:  Denies malaise, headaches, eye symptoms, difficulty swallowing or breathing problems.   No chest pains or palpitations.   No change in bowel habits, no tarry stools or hematochezia. Has acid reflux.  No genital  lesions.  No bleeding disorders, no seizures.  pscych normal affect, normal mood      Pt alert, oriented, no distress  HEENT: s wnl.  Neck: supple, no JVD, no lymphadenopathy  Chest: CV NSR  Lungs: normal chest expansion  Abdomen flat, nontender, no organomegaly, no masses.  No hernias  Penis nl, meatus nl  Testes noncircumcised; redundant prepuce is present, but slides well, epi nl, scrotum nl  ASHOK: anus nl, sphincter nl tone, mucosa without lesions, prostate 40 gm, symmetric, no nodules or indurations  Extremities: no edema, peripheral pulses nl  Neuro: preserved      IMP  Elevated psa  bph on flomax  Overactive bladder, on ditropan 10 xl, or myrbetriq 50  Will do cystoscopy to observe the bladder neck and see if pt is a candidate for turp

## 2018-06-18 NOTE — DISCHARGE SUMMARY
DISCHARGE SUMMARY:    Reason for hospitalization: elevated psa, poor voiding stream, urinary frequency, bph  Hospital course: pt was stable and comfortable  Final dx: elevated psa, bph   Procedure performed: transrectal ultrasound of prostate, ultrasound guidance for needle biopsies, multiple transrectal needle biopsies, cystoscopy  Condition on discharge: satisfactory  Discharge disposition: home  Follow up: Return to urology clinic in 1 week  Medication changes: no change with home meds  Pt instructions: drink abundant fluids  Continue with prescribed antibiotic prophylaxis until finished  Diet: regular  Activity: no restrictions

## 2018-06-18 NOTE — DISCHARGE INSTRUCTIONS
PROSTATE BIOPSY      DOS:   Minimal activity for 24 hours.   May shower or bathe today.   Advance diet as tolerated.   Drink a lot of liquids until you see your doctor.   Expect blood in urine/stool for up to 3 weeks.   Expect blood in semen for up to 8 weeks.   Resume home medications as prescribed   Biopsy results may not be available for 10-14 days    DONT:   No driving for 24 hours or while taking narcotic pain medication   No aspirin, NSAIDS or blood thinners for 7 days   No sexual intercourse, heavy lifting, or strenuous activity for 7 days.   DO NOT TAKE ADDITIONAL TYLENOL/ACETAMINOPHEN WHILE TAKING NARCOTIC PAIN MEDICATION THAT CONTAINS TYLENOL/ACETAMINOPHEN.    CALL PHYSICIAN FOR:   Unable to urinate within 6 hours after surgery.   Excessive bleeding.    Fever>101   Persistent pain not relieved by pain medication.    Contact your physician for emergencies at [phone]         Recovery After Procedural Sedation (Adult)  You have been given medicine by vein to make you sleep during your surgery. This may have included both a pain medicine and sleeping medicine. Most of the effects have worn off. But you may still have some drowsiness for the next 6 to 8 hours.  Home care  Follow these guidelines when you get home:  · For the next 8 hours, you should be watched by a responsible adult. This person should make sure your condition is not getting worse.  · Don't drink any alcohol for the next 24 hours.  · Don't drive, operate dangerous machinery, or make important business or personal decisions during the next 24 hours.  Note: Your healthcare provider may tell you not to take any medicine by mouth for pain or sleep in the next 4 hours. These medicines may react with the medicines you were given in the hospital. This could cause a much stronger response than usual.  Follow-up care  Follow up with your healthcare provider if you are not alert and back to your usual level of activity within 12  hours.  When to seek medical advice  Call your healthcare provider right away if any of these occur:  · Drowsiness gets worse  · Weakness or dizziness gets worse  · Repeated vomiting  · You can't be awakened   Date Last Reviewed: 10/18/2016  © 5762-6540 [employer]. [address] [place] [address], [place] [address]. All rights reserved. This information is not intended as a substitute for professional medical care. Always follow your healthcare professional's instructions.

## 2018-06-18 NOTE — ANESTHESIA PREPROCEDURE EVALUATION
06/18/2018  Eligio Richardson is a 73 y.o., male.    Anesthesia Evaluation      I have reviewed the Medications.     Review of Systems  Anesthesia Hx:  No problems with previous Anesthesia   Social:  Non-Smoker, No Alcohol Use    EENT/Dental:   Hard of hearing. TMJ.   Cardiovascular:  Cardiovascular Normal     Pulmonary:   Sleep Apnea    Renal/:  Renal/ Normal     Hepatic/GI:   Hiatal Hernia, GERD    Neurological:  Neurology Normal    Endocrine:   Hypothyroidism        Physical Exam  General:  Well nourished    Airway/Jaw/Neck:  Airway Findings: Mouth Opening: Normal General Airway Assessment: Adult  Mallampati: II  Jaw/Neck Findings:  Neck ROM: Extension Decreased, Mild       Chest/Lungs:  Chest/Lungs Findings: Clear to auscultation, Normal Respiratory Rate     Heart/Vascular:  Heart Findings: Rate: Normal  Rhythm: Regular Rhythm  Sounds: Normal  Heart murmur: negative Vascular Findings: Normal (No carotid bruits.)       Mental Status:  Mental Status Findings:  Cooperative, Alert and Oriented         Anesthesia Plan  Type of Anesthesia, risks & benefits discussed:  Anesthesia Type:  general  Patient's Preference:   Intra-op Monitoring Plan:   Intra-op Monitoring Plan Comments:   Post Op Pain Control Plan:   Post Op Pain Control Plan Comments:   Induction:   IV  Beta Blocker:  Patient is not currently on a Beta-Blocker (No further documentation required).       Informed Consent: Patient understands risks and agrees with Anesthesia plan.  Questions answered. Anesthesia consent signed with patient.  ASA Score: 2     Day of Surgery Review of History & Physical:        Anesthesia Plan Notes: Propofol general.        Ready For Surgery From Anesthesia Perspective.

## 2018-06-26 ENCOUNTER — OFFICE VISIT (OUTPATIENT)
Dept: UROLOGY | Facility: CLINIC | Age: 73
End: 2018-06-26
Payer: MEDICARE

## 2018-06-26 VITALS
DIASTOLIC BLOOD PRESSURE: 89 MMHG | BODY MASS INDEX: 29.36 KG/M2 | HEART RATE: 82 BPM | SYSTOLIC BLOOD PRESSURE: 133 MMHG | WEIGHT: 221.56 LBS | HEIGHT: 73 IN

## 2018-06-26 DIAGNOSIS — R35.0 FREQUENCY OF MICTURITION: ICD-10-CM

## 2018-06-26 DIAGNOSIS — Z09 POSTOP CHECK: Primary | ICD-10-CM

## 2018-06-26 LAB
BILIRUB SERPL-MCNC: NORMAL MG/DL
BLOOD URINE, POC: NORMAL
COLOR, POC UA: YELLOW
GLUCOSE UR QL STRIP: NORMAL
KETONES UR QL STRIP: NORMAL
LEUKOCYTE ESTERASE URINE, POC: NORMAL
NITRITE, POC UA: NORMAL
PH, POC UA: 5.5
PROTEIN, POC: NORMAL
SPECIFIC GRAVITY, POC UA: 1.02
UROBILINOGEN, POC UA: NORMAL

## 2018-06-26 PROCEDURE — 81002 URINALYSIS NONAUTO W/O SCOPE: CPT | Mod: PBBFAC,PO | Performed by: UROLOGY

## 2018-06-26 PROCEDURE — 99999 PR PBB SHADOW E&M-EST. PATIENT-LVL III: CPT | Mod: PBBFAC,,, | Performed by: UROLOGY

## 2018-06-26 PROCEDURE — 99024 POSTOP FOLLOW-UP VISIT: CPT | Mod: POP,,, | Performed by: UROLOGY

## 2018-06-26 PROCEDURE — 99213 OFFICE O/P EST LOW 20 MIN: CPT | Mod: PBBFAC,PO | Performed by: UROLOGY

## 2018-06-26 NOTE — PROGRESS NOTES
UROLOGY [place]  6 26 18    c-c postop    Age 73, comes in to follow up on procedure he had 8 days ago. He had a prostate biopsy owing to elevated psa of 6.8, having been 5.7 last year and 4.2 the year before, and he has a family hx of prostate ca. The biopsy procedure went well and the result is pending. We called the pathology department and they said they were finishing the studies on the tissue and will report very soon.     In addition to a prostate biopsy procedure, pt had a cystoscopy under the same anesthesia, because of bph with intermittently poor voiding flow and need for flomax, and a tendency to urinary urgency and urge incontinence for which he has been on ditropan.     The cystoscopy showed an obstructing prostate, especially a ball-valve type prostate prominence at the level of the bladder neck, that is expected to be obstructing at the time of voiding.     Pt says that at some point he replaced the ditropan with myrbetriq, for which he had to pay a very high copay. And the myrbetriq did not help him, and, on the contrary, gave him a bloated abdomen and he had trouble breathing from so much abdominal distension. He stopped the myrbetriq and he is better now.    Also complaints of retrograde ejaculation    BLADDERSCAN ULTRASOUND  22  ml residual      IMP  Elevated psa, for which we are waiting for pathology results  bph on flomax  Urinary frequency and urge incontinence, for now stay off medication, can resume later (would start with ditropan first)  Retrograde ejaculation, reassured

## 2018-06-28 ENCOUNTER — TELEPHONE (OUTPATIENT)
Dept: UROLOGY | Facility: CLINIC | Age: 73
End: 2018-06-28

## 2018-06-28 NOTE — TELEPHONE ENCOUNTER
I called pt and talked to pt and wife regarding his prostate biopsy results. I explained that only one sextant was positive and of the lowest aggressiveness. Therefore, I am not recommending active treatment at this time, nor any imaging studies.     I would like to talk to pt and wife in the office (cancer talk) whenever possible.

## 2018-06-29 NOTE — TELEPHONE ENCOUNTER
----- Message from Ashley Chaves sent at 6/29/2018 11:24 AM CDT -----  Contact: Annie  Type:  Sooner Apoointment Request    Caller is requesting a sooner appointment.  Caller declined first available appointment listed below.  Caller will not accept being placed on the waitlist and is requesting a message be sent to doctor.    Name of Caller:  Annie patient's wife  When is the first available appointment?  07/10  Symptoms:    Best Call Back Number:  [phone]  Additional Information:  Requesting a call back stated spoke with  yesterday was advise test results was positive need to be seen sooner

## 2018-07-02 ENCOUNTER — OFFICE VISIT (OUTPATIENT)
Dept: DERMATOLOGY | Facility: CLINIC | Age: 73
End: 2018-07-02
Payer: MEDICARE

## 2018-07-02 DIAGNOSIS — L82.1 SEBORRHEIC KERATOSES: ICD-10-CM

## 2018-07-02 DIAGNOSIS — D18.00 ANGIOMA: ICD-10-CM

## 2018-07-02 DIAGNOSIS — Z85.828 PERSONAL HISTORY OF MALIGNANT NEOPLASM OF SKIN: ICD-10-CM

## 2018-07-02 DIAGNOSIS — L57.0 ACTINIC KERATOSIS: ICD-10-CM

## 2018-07-02 DIAGNOSIS — D22.9 BENIGN NEVUS: ICD-10-CM

## 2018-07-02 DIAGNOSIS — L81.4 LENTIGINES: Primary | ICD-10-CM

## 2018-07-02 PROCEDURE — 99999 PR PBB SHADOW E&M-EST. PATIENT-LVL II: CPT | Mod: PBBFAC,,, | Performed by: DERMATOLOGY

## 2018-07-02 PROCEDURE — 17003 DESTRUCT PREMALG LES 2-14: CPT | Mod: S$PBB,,, | Performed by: DERMATOLOGY

## 2018-07-02 PROCEDURE — 17003 DESTRUCT PREMALG LES 2-14: CPT | Mod: PBBFAC,PO | Performed by: DERMATOLOGY

## 2018-07-02 PROCEDURE — 17000 DESTRUCT PREMALG LESION: CPT | Mod: PBBFAC,PO | Performed by: DERMATOLOGY

## 2018-07-02 PROCEDURE — 99212 OFFICE O/P EST SF 10 MIN: CPT | Mod: PBBFAC,PO | Performed by: DERMATOLOGY

## 2018-07-02 PROCEDURE — 99214 OFFICE O/P EST MOD 30 MIN: CPT | Mod: 25,S$PBB,, | Performed by: DERMATOLOGY

## 2018-07-02 PROCEDURE — 17000 DESTRUCT PREMALG LESION: CPT | Mod: S$PBB,,, | Performed by: DERMATOLOGY

## 2018-07-02 NOTE — PROGRESS NOTES
Subjective:       Patient ID:  Eligio Richardson is a 73 y.o. male who presents for   Chief Complaint   Patient presents with    Lesion     72 yo M presents for follow up.  Last seen by Dr. Berumen on 2-.  The lesion on left cheek that was biopsied at last OV seems to be tender, getting larger (Bx proven inflamed AK)   There is also a lesion on back present for few months, itchy, not treated in the past    Derm Hx: multiple NMSCs (2005, 2014, 2016); AKs     No fhx of skin cancer        Past Medical History:   Diagnosis Date    Anticoagulant long-term use     Cancer     skin cancer to face    Cholelithiasis     Constipation, chronic     severe    Degenerative disc disease, cervical     Gallstones     GERD (gastroesophageal reflux disease)     Hiatal hernia     Hypothyroidism     Inguinal hernia without mention of obstruction or gangrene, unilateral or unspecified, (not specified as recurrent)     Insomnia     Irregular heart beat     currently not an issue and not on any meds for it    Osteoarthritis     Sleep apnea      Review of Systems   Skin: Positive for wears hat. Negative for itching, rash, daily sunscreen use and recent sunburn.   Hematologic/Lymphatic: Bruises/bleeds easily.        Objective:    Physical Exam   Constitutional: He appears well-developed and well-nourished. No distress.   HENT:   Mouth/Throat: Lips normal.    Eyes: Lids are normal.  No conjunctival no injection.   Neurological: He is alert and oriented to person, place, and time. He is not disoriented.   Psychiatric: He has a normal mood and affect.   Skin:   Areas Examined (abnormalities noted in diagram):   Scalp / Hair Palpated and Inspected  Head / Face Inspection Performed  Neck Inspection Performed  Chest / Axilla Inspection Performed  Abdomen Inspection Performed  Back Inspection Performed  RUE Inspected  LUE Inspection Performed                   Diagram Legend     Erythematous scaling macule/papule c/w actinic  keratosis       Vascular papule c/w angioma      Pigmented verrucoid papule/plaque c/w seborrheic keratosis      Yellow umbilicated papule c/w sebaceous hyperplasia      Irregularly shaped tan macule c/w lentigo     1-2 mm smooth white papules consistent with Milia      Movable subcutaneous cyst with punctum c/w epidermal inclusion cyst      Subcutaneous movable cyst c/w pilar cyst      Firm pink to brown papule c/w dermatofibroma      Pedunculated fleshy papule(s) c/w skin tag(s)      Evenly pigmented macule c/w junctional nevus     Mildly variegated pigmented, slightly irregular-bordered macule c/w mildly atypical nevus      Flesh colored to evenly pigmented papule c/w intradermal nevus       Pink pearly papule/plaque c/w basal cell carcinoma      Erythematous hyperkeratotic cursted plaque c/w SCC      Surgical scar with no sign of skin cancer recurrence      Open and closed comedones      Inflammatory papules and pustules      Verrucoid papule consistent consistent with wart     Erythematous eczematous patches and plaques     Dystrophic onycholytic nail with subungual debris c/w onychomycosis     Umbilicated papule    Erythematous-base heme-crusted tan verrucoid plaque consistent with inflamed seborrheic keratosis     Erythematous Silvery Scaling Plaque c/w Psoriasis     See annotation      Assessment / Plan:        Lentigines  These are benign hyperpigmented sun induced lesions. Daily sun protection will reduce the number of new lesions. Treatment of these benign lesions (such as IPL or topicals such as HQ) are considered cosmetic.    Angioma  This is a benign vascular lesion. Reassurance given. No treatment required.     Seborrheic keratoses  These are benign inherited growths without a malignant potential. Reassurance given to patient. No treatment is necessary.     Benign nevus  Reassurance that his nevi appear benign with regular and consistent pigment pattern on dermoscopy    Actinic keratosis  No evidence  of recurrence at site of biopsy  Cryosurgery Procedure Note    Verbal consent from the patient is obtained and the patient is aware of the precancerous quality and need for treatment of these lesions. Liquid nitrogen cryosurgery is applied to the 4 actinic keratoses, as detailed in the physical exam, to produce a freeze injury. The patient is aware that blisters may form and is instructed on wound care with gentle cleansing and use of vaseline ointment to keep moist until healed. The patient is supplied a handout on cryosurgery and is instructed to call if lesions do not completely resolve.    Personal history of malignant neoplasm of skin  Area(s) of previous NMSC evaluated with no signs of recurrence.    Upper body skin examination performed today including at least 6 points as noted in physical examination. No lesions suspicious for malignancy noted.             Follow-up in about 6 months (around 1/2/2019).

## 2018-07-12 ENCOUNTER — OFFICE VISIT (OUTPATIENT)
Dept: UROLOGY | Facility: CLINIC | Age: 73
End: 2018-07-12
Payer: MEDICARE

## 2018-07-12 VITALS
HEART RATE: 63 BPM | WEIGHT: 221.56 LBS | HEIGHT: 73 IN | BODY MASS INDEX: 29.36 KG/M2 | DIASTOLIC BLOOD PRESSURE: 83 MMHG | SYSTOLIC BLOOD PRESSURE: 141 MMHG

## 2018-07-12 DIAGNOSIS — C61 MALIGNANT NEOPLASM OF PROSTATE: Primary | ICD-10-CM

## 2018-07-12 PROCEDURE — 99215 OFFICE O/P EST HI 40 MIN: CPT | Mod: S$PBB,,, | Performed by: UROLOGY

## 2018-07-12 PROCEDURE — 99213 OFFICE O/P EST LOW 20 MIN: CPT | Mod: PBBFAC,PO | Performed by: UROLOGY

## 2018-07-12 PROCEDURE — 99999 PR PBB SHADOW E&M-EST. PATIENT-LVL III: CPT | Mod: PBBFAC,,, | Performed by: UROLOGY

## 2018-07-12 NOTE — PROGRESS NOTES
[facility] [employer]  7 12 18    Cc prostate ca    Age 73, accompanied by wife and daughter. Pt's psa was 4.7 in 2015, 5.7 in 2016, 5.7 again in 2017, and 6.8 in June 2018.     Pt had had a negative prostate biopsy on 3 3 17    Pt gave a family hx of cancer, and a maternal uncle with prostate cancer.    ASHOK: anus nl, sphincter nl tone, mucosa without lesions, prostate 40 gm, symmetric, no nodules or indurations    Prostate ultrasound done 6 18 18: Gland volume: 47.1 cm3  FINAL PATHOLOGIC DIAGNOSIS 6 18 18  1. PROSTATE, RIGHT BASE, BIOPSY:  - Benign prostate tissue.  2. PROSTATE, RIGHT MID, BIOPSY:  - Benign prostate tissue.  3. PROSTATE, RIGHT APEX, BIOPSY:  - Benign prostate tissue.  4. PROSTATE, LEFT BASE, BIOPSY:  - Benign prostate tissue.  5. PROSTATE, LEFT MID, BIOPSY:  - Benign prostate tissue.  6. PROSTATE, LEFT APEX, BIOPSY:  - Prostatic adenocarcinoma, Candy score 3+3=6 (Grade group 1) involving 2/2 cores (20% and 40%).  - The total length of carcinoma is 2 mm and 4 mm.    METASTATIC W/U not done    IMP  cT1C cN0 cM0 candy 6 prostate adenocarcinoma, 1 sextant, 2/2 cores, 20 and 4)% involvement  psa 6.8  Low volume and low grade prostate ca  We discused diagnosis, staging and grading of prostate ca, the various treatment modalities, including radical surgery, external beam radiotherapy and brachytherapy. I especially recommended active surveillance, and did not think that we needed to move forward with active therapy at this time.     I recommended a plan of psa q 3 mo, ASHOK q 6 mo, and repeat prostate biopsy in 1 year.     Pt and family are agreeable, and they would like to get a second opinion at [facility] in [place]. I am delighted to send any material that pt has with us, to [facility] to be reevaluated there.

## 2018-07-17 DIAGNOSIS — N32.81 OAB (OVERACTIVE BLADDER): Primary | ICD-10-CM

## 2018-07-17 RX ORDER — OXYBUTYNIN CHLORIDE 10 MG/1
10 TABLET, EXTENDED RELEASE ORAL DAILY
Qty: 30 TABLET | Refills: 11 | Status: SHIPPED | OUTPATIENT
Start: 2018-07-17 | End: 2019-06-10 | Stop reason: SDUPTHER

## 2018-07-17 NOTE — TELEPHONE ENCOUNTER
----- Message from Amari Wright sent at 7/17/2018 12:15 PM CDT -----  Contact: Wife  Annie, [phone]. Calling to get refill for oxybutynin 10 mg. Says patient was started on a different medication, but feels it's not working for him. Would like to start back on the oxybutynin. Please advise. Thanks.    [facility]  [address] [place] [id number]

## 2018-09-25 PROBLEM — C61 PROSTATE CANCER: Status: ACTIVE | Noted: 2018-09-25

## 2018-09-25 PROBLEM — E66.3 OVERWEIGHT: Status: ACTIVE | Noted: 2018-09-25

## 2018-11-28 PROBLEM — C91.10 CLL (CHRONIC LYMPHOCYTIC LEUKEMIA): Status: ACTIVE | Noted: 2018-11-28

## 2018-12-15 ENCOUNTER — OFFICE VISIT (OUTPATIENT)
Dept: URGENT CARE | Facility: CLINIC | Age: 73
End: 2018-12-15
Payer: MEDICARE

## 2018-12-15 VITALS
SYSTOLIC BLOOD PRESSURE: 130 MMHG | OXYGEN SATURATION: 97 % | HEART RATE: 69 BPM | DIASTOLIC BLOOD PRESSURE: 85 MMHG | TEMPERATURE: 98 F | RESPIRATION RATE: 16 BRPM

## 2018-12-15 DIAGNOSIS — R09.81 SINUS CONGESTION: Primary | ICD-10-CM

## 2018-12-15 PROCEDURE — 99214 OFFICE O/P EST MOD 30 MIN: CPT | Mod: S$GLB,,, | Performed by: PHYSICIAN ASSISTANT

## 2018-12-15 RX ORDER — PREDNISONE 10 MG/1
TABLET ORAL
Qty: 18 TABLET | Refills: 0 | Status: SHIPPED | OUTPATIENT
Start: 2018-12-15 | End: 2019-02-28 | Stop reason: ALTCHOICE

## 2018-12-15 RX ORDER — BENZONATATE 200 MG/1
200 CAPSULE ORAL 3 TIMES DAILY PRN
Qty: 60 CAPSULE | Refills: 1 | Status: SHIPPED | OUTPATIENT
Start: 2018-12-15 | End: 2018-12-25

## 2018-12-15 RX ORDER — PROMETHAZINE HYDROCHLORIDE AND DEXTROMETHORPHAN HYDROBROMIDE 6.25; 15 MG/5ML; MG/5ML
5 SYRUP ORAL NIGHTLY PRN
Qty: 180 ML | Refills: 1 | Status: SHIPPED | OUTPATIENT
Start: 2018-12-15 | End: 2018-12-25

## 2018-12-15 NOTE — PATIENT INSTRUCTIONS
Chronic Sinusitis  Chronic sinusitis is a long-term swelling or infection of the sinuses. If sinusitis lasts more than 12 weeks, it is called chronic.    What is chronic sinusitis?  Sinuses are air-filled spaces in the skull behind the face. They are kept moist and clean by a lining of mucosa. Things such as pollen, smoke, and chemical fumes can irritate the mucosa. Constant exposure to irritants can cause ongoing inflammation of this lining. It can also damage tiny hairlike cilia that cover the mucosa. Cilia help carry mucus toward the opening of the sinus. Damage to cilia keeps mucus from draining from the sinuses.  Causes of chronic sinusitis  Problems that irritate the mucosa or block drainage can lead to chronic sinusitis. These may include:  · Infections  · Chronic allergies  · Nasal polyps, deviated septum, or other blockages  · Constant exposure to irritants, such as cigarette smoke or fumes  · Asthma  · Acute sinusitis that keeps coming back  Common symptoms of chronic sinusitis  Symptoms may include:  · Facial pain and pressure  · Headache and sinus pain  · Nasal congestion  · Thick, colored drainage from the nose  · Thick mucus draining down the back of the throat (postnasal drainage)  · Loss of smell  · Fever  · Cough  · Sore throat  Diagnosing chronic sinusitis  Your doctor will ask about your symptoms and health history. The doctor will look at your nose and face. You may have imaging studies such as an X-ray or CT scan of the sinuses. Your doctor may also take a sample of the drainage to check for bacteria. You may also have an endoscopy. During this test, the doctor puts a lighted tube through your nose up into your sinuses to look at the sinuses.  Treating chronic sinusitis  Treatment involves reducing irritation and inflammation. Your plan may include:  · Taking medicines. Your doctor may prescribe medicines to reduce the amount of mucus and swelling. These help unblock the sinuses and allow them  to drain. You will need to take antibiotics if you have a bacterial infection.  · Flushing your sinuses. Your doctor may suggest sinus irrigation. This is flushing your sinuses with saltwater or saline solution. This helps to clear out mucus.  · Controlling allergies. If you have allergies, you should have a plan to help control them. This plan may include medicines or allergy shots.  · Having surgery. In some cases, you may need surgery on the nose, sinuses, or both. This can improve sinus drainage or remove nasal blockages.  Date Last Reviewed: 10/1/2016  © 1317-9344 [employer] [address] [place] [address], [place] [id number]. All rights reserved. This information is not intended as a substitute for professional medical care. Always follow your healthcare professional's instructions.    You received a steroid today - this can elevate your blood pressure, elevate your blood sugar, water weight gain, nervous energy, redness to the face and dimpling of the skin where the shot goes in.   If you were prescribed a narcotic medication, do not drive or operate heavy equipment or machinery while taking these medications.  You must understand that you've received an Urgent Care treatment only and that you may be released before all your medical problems are known or treated. You, the patient, will arrange for follow up care as instructed.  Follow up with your PCP or specialty clinic as directed in the next 1-2 weeks if not improved or as needed.  You can call [phone] to schedule an appointment with the appropriate provider.  If your condition worsens we recommend that you receive another evaluation at the emergency room immediately or contact your primary medical clinics after hours call service to discuss your concerns.  Please return here or go to the Emergency Department for any concerns or worsening of condition.

## 2018-12-15 NOTE — PROGRESS NOTES
Subjective:       Patient ID: Eligio Richardson is a 73 y.o. male.    Vitals:  oral temperature is 97.9 °F (36.6 °C). His blood pressure is 130/85 and his pulse is 69. His respiration is 16 and oxygen saturation is 97%.     Chief Complaint: Nasal Congestion    Pt c/o nasal congestion, 1 month intermittent, post nasal drip, productive cough, bilateral ear congestion, taking otc allergy with no relief, took augment 1 week ago with no relief (from PCP)      URI    This is a new problem. The current episode started 1 to 4 weeks ago. The problem has been unchanged. There has been no fever. Associated symptoms include congestion, coughing and headaches. Pertinent negatives include no ear pain, nausea, rash, sinus pain, sore throat, vomiting or wheezing. Treatments tried: otc allergy  The treatment provided no relief.       Constitution: Negative for chills, sweating, fatigue and fever.   HENT: Positive for congestion and sinus pressure. Negative for ear pain, sinus pain, sore throat and voice change.    Neck: Negative for painful lymph nodes.   Eyes: Negative for eye redness.   Respiratory: Positive for cough and sputum production. Negative for chest tightness, bloody sputum, COPD, shortness of breath, stridor, wheezing and asthma.    Gastrointestinal: Negative for nausea and vomiting.   Musculoskeletal: Negative for muscle ache.   Skin: Negative for rash.   Allergic/Immunologic: Negative for seasonal allergies and asthma.   Neurological: Positive for headaches.   Hematologic/Lymphatic: Negative for swollen lymph nodes.       Objective:      Physical Exam   Constitutional: He is oriented to person, place, and time. He appears well-developed and well-nourished. He is cooperative.  Non-toxic appearance. He does not appear ill. No distress.   HENT:   Head: Normocephalic and atraumatic.   Right Ear: Hearing, tympanic membrane, external ear and ear canal normal.   Left Ear: Hearing, tympanic membrane, external ear and ear canal  normal.   Nose: Nose normal. No mucosal edema, rhinorrhea or nasal deformity. No epistaxis. Right sinus exhibits no maxillary sinus tenderness and no frontal sinus tenderness. Left sinus exhibits no maxillary sinus tenderness and no frontal sinus tenderness.   Mouth/Throat: Uvula is midline and mucous membranes are normal. No trismus in the jaw. Normal dentition. No uvula swelling. Posterior oropharyngeal erythema present.   Eyes: Conjunctivae and lids are normal. No scleral icterus.   Sclera clear bilat   Neck: Trachea normal, full passive range of motion without pain and phonation normal. Neck supple.   Cardiovascular: Normal rate, regular rhythm, normal heart sounds, intact distal pulses and normal pulses.   Pulmonary/Chest: Effort normal and breath sounds normal. No respiratory distress.   Abdominal: Soft. Normal appearance and bowel sounds are normal. He exhibits no distension. There is no tenderness.   Musculoskeletal: Normal range of motion. He exhibits no edema or deformity.   Neurological: He is alert and oriented to person, place, and time. He exhibits normal muscle tone. Coordination normal.   Skin: Skin is warm, dry and intact. He is not diaphoretic. No pallor.   Psychiatric: He has a normal mood and affect. His speech is normal and behavior is normal. Judgment and thought content normal. Cognition and memory are normal.   Nursing note and vitals reviewed.      Assessment:       1. Sinus congestion        Plan:         Sinus congestion  -     predniSONE (DELTASONE) 10 MG tablet; Take two pills po x 5 days, 1 pill po x 5 days, 1/2 pill po until empty. Start 24 hours after injection.  Dispense: 18 tablet; Refill: 0  -     benzonatate (TESSALON) 200 MG capsule; Take 1 capsule (200 mg total) by mouth 3 (three) times daily as needed for Cough.  Dispense: 60 capsule; Refill: 1  -     promethazine-dextromethorphan (PROMETHAZINE-DM) 6.25-15 mg/5 mL Syrp; Take 5 mLs by mouth nightly as needed.  Dispense: 180 mL;  Refill: 1  -     Ambulatory referral to ENT    patient leaving for [place] in the AM; will follow up with ENT when he returns.     Patient Instructions     Chronic Sinusitis  Chronic sinusitis is a long-term swelling or infection of the sinuses. If sinusitis lasts more than 12 weeks, it is called chronic.    What is chronic sinusitis?  Sinuses are air-filled spaces in the skull behind the face. They are kept moist and clean by a lining of mucosa. Things such as pollen, smoke, and chemical fumes can irritate the mucosa. Constant exposure to irritants can cause ongoing inflammation of this lining. It can also damage tiny hairlike cilia that cover the mucosa. Cilia help carry mucus toward the opening of the sinus. Damage to cilia keeps mucus from draining from the sinuses.  Causes of chronic sinusitis  Problems that irritate the mucosa or block drainage can lead to chronic sinusitis. These may include:  · Infections  · Chronic allergies  · Nasal polyps, deviated septum, or other blockages  · Constant exposure to irritants, such as cigarette smoke or fumes  · Asthma  · Acute sinusitis that keeps coming back  Common symptoms of chronic sinusitis  Symptoms may include:  · Facial pain and pressure  · Headache and sinus pain  · Nasal congestion  · Thick, colored drainage from the nose  · Thick mucus draining down the back of the throat (postnasal drainage)  · Loss of smell  · Fever  · Cough  · Sore throat  Diagnosing chronic sinusitis  Your doctor will ask about your symptoms and health history. The doctor will look at your nose and face. You may have imaging studies such as an X-ray or CT scan of the sinuses. Your doctor may also take a sample of the drainage to check for bacteria. You may also have an endoscopy. During this test, the doctor puts a lighted tube through your nose up into your sinuses to look at the sinuses.  Treating chronic sinusitis  Treatment involves reducing irritation and inflammation. Your plan may  include:  · Taking medicines. Your doctor may prescribe medicines to reduce the amount of mucus and swelling. These help unblock the sinuses and allow them to drain. You will need to take antibiotics if you have a bacterial infection.  · Flushing your sinuses. Your doctor may suggest sinus irrigation. This is flushing your sinuses with saltwater or saline solution. This helps to clear out mucus.  · Controlling allergies. If you have allergies, you should have a plan to help control them. This plan may include medicines or allergy shots.  · Having surgery. In some cases, you may need surgery on the nose, sinuses, or both. This can improve sinus drainage or remove nasal blockages.  Date Last Reviewed: 10/1/2016  © 2216-4948 [employer]. [address] [place] [address], [place] [address]. All rights reserved. This information is not intended as a substitute for professional medical care. Always follow your healthcare professional's instructions.    You received a steroid today - this can elevate your blood pressure, elevate your blood sugar, water weight gain, nervous energy, redness to the face and dimpling of the skin where the shot goes in.   If you were prescribed a narcotic medication, do not drive or operate heavy equipment or machinery while taking these medications.  You must understand that you've received an Urgent Care treatment only and that you may be released before all your medical problems are known or treated. You, the patient, will arrange for follow up care as instructed.  Follow up with your PCP or specialty clinic as directed in the next 1-2 weeks if not improved or as needed.  You can call [phone] to schedule an appointment with the appropriate provider.  If your condition worsens we recommend that you receive another evaluation at the emergency room immediately or contact your primary medical clinics after hours call service to discuss your concerns.  Please return here or go to the  Emergency Department for any concerns or worsening of condition.

## 2018-12-22 ENCOUNTER — OFFICE VISIT (OUTPATIENT)
Dept: URGENT CARE | Facility: CLINIC | Age: 73
End: 2018-12-22
Payer: MEDICARE

## 2018-12-22 VITALS
TEMPERATURE: 97 F | DIASTOLIC BLOOD PRESSURE: 89 MMHG | BODY MASS INDEX: 28.23 KG/M2 | HEIGHT: 73 IN | SYSTOLIC BLOOD PRESSURE: 151 MMHG | WEIGHT: 213 LBS | RESPIRATION RATE: 16 BRPM | HEART RATE: 56 BPM | OXYGEN SATURATION: 97 %

## 2018-12-22 DIAGNOSIS — J40 BRONCHITIS: Primary | ICD-10-CM

## 2018-12-22 PROCEDURE — 99214 OFFICE O/P EST MOD 30 MIN: CPT | Mod: 25,S$GLB,, | Performed by: INTERNAL MEDICINE

## 2018-12-22 PROCEDURE — 96372 THER/PROPH/DIAG INJ SC/IM: CPT | Mod: S$GLB,,, | Performed by: INTERNAL MEDICINE

## 2018-12-22 RX ORDER — DEXAMETHASONE SODIUM PHOSPHATE 100 MG/10ML
10 INJECTION INTRAMUSCULAR; INTRAVENOUS ONCE
Status: COMPLETED | OUTPATIENT
Start: 2018-12-22 | End: 2018-12-22

## 2018-12-22 RX ORDER — ALBUTEROL SULFATE 90 UG/1
2 AEROSOL, METERED RESPIRATORY (INHALATION) EVERY 6 HOURS PRN
Qty: 1 INHALER | Refills: 0 | Status: SHIPPED | OUTPATIENT
Start: 2018-12-22 | End: 2019-06-19 | Stop reason: SDUPTHER

## 2018-12-22 RX ADMIN — DEXAMETHASONE SODIUM PHOSPHATE 10 MG: 100 INJECTION INTRAMUSCULAR; INTRAVENOUS at 09:12

## 2018-12-22 NOTE — PROGRESS NOTES
"Subjective:       Patient ID: Eligio Richardson is a 73 y.o. male.    Vitals:  height is 6' 1" (1.854 m) and weight is 96.6 kg (213 lb). His temperature is 97.1 °F (36.2 °C). His blood pressure is 151/89 (abnormal) and his pulse is 56 (abnormal). His respiration is 16 and oxygen saturation is 97%.     Chief Complaint: URI (pt has been sick for over a week)    Pt said he is still sick since his visit here last week. Cough congested      URI    This is a new problem. The current episode started 1 to 4 weeks ago. The problem has been unchanged. There has been no fever. Associated symptoms include congestion and coughing. Pertinent negatives include no ear pain, nausea, rash, sinus pain, sore throat, vomiting or wheezing. He has tried decongestant, antihistamine, inhaler use and increased fluids for the symptoms. The treatment provided mild relief.       Constitution: Negative for chills, sweating, fatigue and fever.   HENT: Positive for congestion and sinus pressure. Negative for ear pain, sinus pain, sore throat and voice change.    Neck: Negative for painful lymph nodes.   Eyes: Negative for eye redness.   Respiratory: Positive for cough and sputum production. Negative for chest tightness, bloody sputum, COPD, shortness of breath, stridor, wheezing and asthma.    Gastrointestinal: Negative for nausea and vomiting.   Musculoskeletal: Negative for muscle ache.   Skin: Negative for rash.   Allergic/Immunologic: Negative for seasonal allergies and asthma.   Hematologic/Lymphatic: Negative for swollen lymph nodes.       Objective:      Physical Exam   Constitutional: He is oriented to person, place, and time. He appears well-developed and well-nourished. He is cooperative.  Non-toxic appearance. He does not appear ill. No distress.   HENT:   Head: Normocephalic and atraumatic.   Right Ear: Hearing, tympanic membrane, external ear and ear canal normal.   Left Ear: Hearing, tympanic membrane, external ear and ear canal " normal.   Nose: Rhinorrhea present. No mucosal edema or nasal deformity. No epistaxis. Right sinus exhibits no maxillary sinus tenderness and no frontal sinus tenderness. Left sinus exhibits no maxillary sinus tenderness and no frontal sinus tenderness.   Mouth/Throat: Uvula is midline and oropharynx is clear and moist. Mucous membranes are dry. No trismus in the jaw. Normal dentition. No uvula swelling. No posterior oropharyngeal erythema.   Eyes: Conjunctivae and lids are normal. Right eye exhibits no discharge. Left eye exhibits no discharge. No scleral icterus.   Sclera clear bilat   Neck: Trachea normal, normal range of motion, full passive range of motion without pain and phonation normal. Neck supple.   Cardiovascular: Normal rate, regular rhythm, normal heart sounds, intact distal pulses and normal pulses.   Pulmonary/Chest: Effort normal and breath sounds normal. Tachypnea noted. No respiratory distress.   wheezing   Abdominal: Soft. Normal appearance and bowel sounds are normal. He exhibits no distension, no pulsatile midline mass and no mass. There is no tenderness.   Musculoskeletal: Normal range of motion. He exhibits no edema or deformity.   Neurological: He is alert and oriented to person, place, and time. He exhibits normal muscle tone. Coordination normal.   Skin: Skin is warm, dry and intact. He is not diaphoretic. No pallor.   Psychiatric: He has a normal mood and affect. His speech is normal and behavior is normal. Judgment and thought content normal. Cognition and memory are normal.   Nursing note and vitals reviewed.      Assessment:       No diagnosis found.    Plan:         There are no diagnoses linked to this encounter.

## 2019-03-20 DIAGNOSIS — N40.0 BENIGN NON-NODULAR PROSTATIC HYPERPLASIA WITHOUT LOWER URINARY TRACT SYMPTOMS: ICD-10-CM

## 2019-03-21 RX ORDER — TAMSULOSIN HYDROCHLORIDE 0.4 MG/1
CAPSULE ORAL
Qty: 30 CAPSULE | Refills: 11 | Status: SHIPPED | OUTPATIENT
Start: 2019-03-21 | End: 2020-04-24 | Stop reason: SDUPTHER

## 2019-06-24 RX ORDER — OXYBUTYNIN CHLORIDE 10 MG/1
TABLET, EXTENDED RELEASE ORAL
Qty: 30 TABLET | Refills: 11 | Status: SHIPPED | OUTPATIENT
Start: 2019-06-24 | End: 2019-10-07 | Stop reason: SDUPTHER

## 2019-10-07 RX ORDER — OXYBUTYNIN CHLORIDE 10 MG/1
TABLET, EXTENDED RELEASE ORAL
Qty: 30 TABLET | Refills: 11 | Status: SHIPPED | OUTPATIENT
Start: 2019-10-07 | End: 2020-04-24 | Stop reason: SDUPTHER

## 2020-02-28 PROBLEM — M19.90 OSTEOARTHRITIS: Status: ACTIVE | Noted: 2020-02-28

## 2020-04-20 PROBLEM — A41.9 SEVERE SEPSIS: Status: ACTIVE | Noted: 2020-04-20

## 2020-04-20 PROBLEM — R65.20 SEVERE SEPSIS: Status: ACTIVE | Noted: 2020-04-20

## 2020-04-20 PROBLEM — N39.0 UTI (URINARY TRACT INFECTION): Status: ACTIVE | Noted: 2020-04-20

## 2020-04-21 PROBLEM — R33.9 URINARY RETENTION: Status: ACTIVE | Noted: 2020-04-21

## 2020-04-22 ENCOUNTER — TELEPHONE (OUTPATIENT)
Dept: UROLOGY | Facility: CLINIC | Age: 75
End: 2020-04-22

## 2020-04-22 NOTE — TELEPHONE ENCOUNTER
----- Message from Enrrique Acevedo sent at 4/22/2020  3:20 PM CDT -----  Contact: pt  Pt is returning call to nurse Isabelle, call placed top pod no answer   Call Back#[phone]  Thanks

## 2020-04-22 NOTE — TELEPHONE ENCOUNTER
----- Message from Gabriella Roman sent at 4/22/2020 11:38 AM CDT -----  Contact: Juliane Godoy  Name of Caller: Juliane dominik Godoy  Reason for Visit/Symptoms: urinary retention  Best Contact Number or Confirm if Mychart Preferred: [phone], for caller  Preferred Date/Time of Appointment: ASAP   Additional Information: Patient is waiting to be discharged from the hospital and they can not do so without having an appointment set up.

## 2020-04-22 NOTE — TELEPHONE ENCOUNTER
Called patient to schedule appt, already scheduled. Per patient's wife, patient had 100.4 fever, then 101.4. She states she gave him tylenol and he went to lie down. Advised to monitor fever and if it does not go down or rises more, to go back to the ED. She expressed understanding.

## 2020-04-22 NOTE — TELEPHONE ENCOUNTER
----- Message from Arun Galdamez sent at 4/22/2020 10:53 AM CDT -----  Contact: geraldine with [facility]  Type:  Sooner Apoointment Request    Caller is requesting a sooner appointment.  Caller declined first available appointment listed below.  Caller will not accept being placed on the waitlist and is requesting a message be sent to doctor.    Name of Caller:  Geraldine  When is the first available appointment?    Symptoms:  F/u from discharge at [facility]  Best Call Back Number:  [phone]  Additional Information:  Needs 1 wk f/u

## 2020-04-29 ENCOUNTER — CLINICAL SUPPORT (OUTPATIENT)
Dept: UROLOGY | Facility: CLINIC | Age: 75
End: 2020-04-29
Payer: MEDICARE

## 2020-04-29 ENCOUNTER — OFFICE VISIT (OUTPATIENT)
Dept: UROLOGY | Facility: CLINIC | Age: 75
End: 2020-04-29
Payer: MEDICARE

## 2020-04-29 DIAGNOSIS — R33.9 URINARY RETENTION: ICD-10-CM

## 2020-04-29 DIAGNOSIS — N30.00 ACUTE CYSTITIS WITHOUT HEMATURIA: Primary | ICD-10-CM

## 2020-04-29 DIAGNOSIS — N40.1 BENIGN PROSTATIC HYPERPLASIA WITH LOWER URINARY TRACT SYMPTOMS, SYMPTOM DETAILS UNSPECIFIED: Primary | ICD-10-CM

## 2020-04-29 LAB
POC RESIDUAL URINE VOLUME: 158 ML (ref 0–100)
POC RESIDUAL URINE VOLUME: 294 ML (ref 0–100)

## 2020-04-29 PROCEDURE — 99499 UNLISTED E&M SERVICE: CPT | Mod: S$PBB,,, | Performed by: UROLOGY

## 2020-04-29 PROCEDURE — 99999 PR PBB SHADOW E&M-EST. PATIENT-LVL I: CPT | Mod: PBBFAC,,, | Performed by: UROLOGY

## 2020-04-29 PROCEDURE — 51798 US URINE CAPACITY MEASURE: CPT | Mod: PBBFAC,PO | Performed by: UROLOGY

## 2020-04-29 PROCEDURE — 99999 PR PBB SHADOW E&M-EST. PATIENT-LVL I: ICD-10-PCS | Mod: PBBFAC,,, | Performed by: UROLOGY

## 2020-04-29 PROCEDURE — 99499 NO LOS: ICD-10-PCS | Mod: S$PBB,,, | Performed by: UROLOGY

## 2020-04-29 PROCEDURE — 99211 OFF/OP EST MAY X REQ PHY/QHP: CPT | Mod: PBBFAC,PO | Performed by: UROLOGY

## 2020-04-29 NOTE — PROGRESS NOTES
Pt seen by nurse, who removed grider. He was emptying the bladder well.   Pt said he did not feel like he needed to see me at this time.

## 2020-04-29 NOTE — PROGRESS NOTES
Patient coming in for PVR after grider catheter being removed this morning. PVR before urination 294mL, PVR after urination 158mL. Patient states he feels fine and does not feel he needs to speak to the doctor. Advised he would finish current course of antibiotics and let us know if he had any more questions. Patient left before seeing Dr. Paz.

## 2020-04-30 ENCOUNTER — TELEPHONE (OUTPATIENT)
Dept: UROLOGY | Facility: CLINIC | Age: 75
End: 2020-04-30

## 2020-04-30 ENCOUNTER — CLINICAL SUPPORT (OUTPATIENT)
Dept: UROLOGY | Facility: CLINIC | Age: 75
End: 2020-04-30
Payer: MEDICARE

## 2020-04-30 DIAGNOSIS — R33.9 URINARY RETENTION: Primary | ICD-10-CM

## 2020-04-30 LAB — POC RESIDUAL URINE VOLUME: 721 ML (ref 0–100)

## 2020-04-30 PROCEDURE — 51702 INSERT TEMP BLADDER CATH: CPT | Mod: S$PBB,,, | Performed by: UROLOGY

## 2020-04-30 PROCEDURE — 51702 INSERT TEMP BLADDER CATH: CPT | Mod: PBBFAC,PO

## 2020-04-30 PROCEDURE — 51702 PR INSERTION OF TEMPORARY INDWELLING BLADDER CATHETER, SIMPLE: ICD-10-PCS | Mod: S$PBB,,, | Performed by: UROLOGY

## 2020-04-30 PROCEDURE — 51798 US URINE CAPACITY MEASURE: CPT | Mod: PBBFAC,PO

## 2020-04-30 NOTE — TELEPHONE ENCOUNTER
----- Message from Lety Carvalho sent at 4/30/2020  7:58 AM CDT -----  Contact: Annie wife  Type: Needs Medical Advice  Who Called:  Annie  Symptoms (please be specific):  Pt had a catheter removal yesterday, he has bladder pain and very small amounts of urine  How long has patient had these symptoms:  ongoing  Pharmacy name and phone #:    [facility] [address] [address]  Phone: [phone] Fax: [phone]      Best Call Back Number: [phone]  Additional Information: Pls call Annie for further

## 2020-04-30 NOTE — TELEPHONE ENCOUNTER
Spoke with patient's wife. Patient was able to urinate a small amount and is not feeling pressure any longer. States he will keep watch and if he's not able to go more, will come back in for PVR.

## 2020-04-30 NOTE — TELEPHONE ENCOUNTER
----- Message from Lili Haney MA sent at 4/30/2020  1:07 PM CDT -----  Contact: [facility]/[phone]  Who Called :Pts' Wife  Additional Information: stated they never received a call back in reference to coming to her the catheter changed and stated they were informed to call the office when on the way. Does not remember who they spoke with. Please call back to discuss.         #Patient is heading to [facility]

## 2020-04-30 NOTE — TELEPHONE ENCOUNTER
----- Message from Jeffrey Velazquez sent at 4/30/2020 11:48 AM CDT -----  Contact: Patient  Type: Needs Medical Advice    Who Called:  Patient  Best Call Back Number: [phone]  Additional Information: Patient would like to discuss previous message as they are looking to come in today. Please call to advise. Thanks!

## 2020-04-30 NOTE — PROGRESS NOTES
Patient c/o brody unable to urinate. PVR performed and showing 721mL. Per Dr. Artis, place grider catheter. Placed 16fr grider catheter using sterile technique. Drained 1100mL clear, orange urine. Attached legbag and provided catheter care instructions. Pt tolerated well and expressed understanding.

## 2020-05-12 ENCOUNTER — CLINICAL SUPPORT (OUTPATIENT)
Dept: UROLOGY | Facility: CLINIC | Age: 75
End: 2020-05-12
Payer: MEDICARE

## 2020-05-12 DIAGNOSIS — R33.9 URINARY RETENTION: Primary | ICD-10-CM

## 2020-05-12 DIAGNOSIS — N40.0 BENIGN NON-NODULAR PROSTATIC HYPERPLASIA WITHOUT LOWER URINARY TRACT SYMPTOMS: ICD-10-CM

## 2020-05-12 NOTE — PROGRESS NOTES
Pt was placed on table in supine position draped appropriately and catheter was then removed and pt was able to site up. Pt tolerated procedure well.       Pt was given catheter supplies to be able to self cath and I taught him how to self cath in case he can not get to us and is in pain. Pt lives a long distance from clinic and requested to have this in case he can not urinate and is in pain.

## 2020-05-13 RX ORDER — TAMSULOSIN HYDROCHLORIDE 0.4 MG/1
CAPSULE ORAL
Qty: 60 CAPSULE | Refills: 2 | Status: SHIPPED | OUTPATIENT
Start: 2020-05-13 | End: 2020-08-17

## 2020-05-13 NOTE — TELEPHONE ENCOUNTER
Patient states he still cannot urinate, advised he has self-cathed one time and was able to empty. Advised to continue to try to urinate and if he cannot, go ahead and self cath, but keep track of volume. Pt's wife expressed understanding.

## 2020-05-13 NOTE — TELEPHONE ENCOUNTER
----- Message from Earline Mosley sent at 5/13/2020  9:08 AM CDT -----   Type: Needs Medical Advice  Who Called:  Pt  Wife xochitl  Symptoms (please be specific):    Still  having  Issues  , caterer  Removed   How long has patient had these symptoms:   Started  today  Best Call Back Number: [phone]  Additional Information:please call  Needs  advice

## 2020-05-14 ENCOUNTER — TELEPHONE (OUTPATIENT)
Dept: UROLOGY | Facility: CLINIC | Age: 75
End: 2020-05-14

## 2020-05-14 NOTE — TELEPHONE ENCOUNTER
----- Message from Alonzo Kunz sent at 5/14/2020  8:11 AM CDT -----  Contact: pt's wife Annie  Type: Needs Medical Advice    Who Called:  Annie    Best Call Back Number: [phone]  Additional Information: Would like to speak to a nurse in the office. States that the pt is still having issues.

## 2020-05-14 NOTE — TELEPHONE ENCOUNTER
Patient's wife states that the patient urinated several times on his own yesterday, each time with a volume of about 100cc. The patient catheterized this morning after urination and had a volume of around 500cc. I advised to cath twice daily, once in the morning and again before bed after urination. Please advised

## 2020-05-21 ENCOUNTER — TELEPHONE (OUTPATIENT)
Dept: UROLOGY | Facility: CLINIC | Age: 75
End: 2020-05-21

## 2020-05-21 NOTE — TELEPHONE ENCOUNTER
Per patient's wife, patient is currently urinating on his own and feels as if he is emptying completely without having to catheterize. Pt would like to know if they need a cystoscopy. Please advise.

## 2020-05-21 NOTE — TELEPHONE ENCOUNTER
----- Message from Ciro Tsai sent at 5/21/2020 11:15 AM CDT -----  Contact: Wife/Annie Valencia called in regarding patient and wanted office to know patient is feeling better but would like to talk to nurse about some test that office had mentioned to them.    Annie's call back number is [phone]

## 2020-05-28 ENCOUNTER — TELEPHONE (OUTPATIENT)
Dept: UROLOGY | Facility: CLINIC | Age: 75
End: 2020-05-28

## 2020-05-28 NOTE — TELEPHONE ENCOUNTER
In view of above, we can cancel cystoscopy.   It is better to be away from healthcare institutions with the covid risk.     If pt develops voiding problems later, we can always reschedule.

## 2020-05-29 ENCOUNTER — TELEPHONE (OUTPATIENT)
Dept: UROLOGY | Facility: CLINIC | Age: 75
End: 2020-05-29

## 2021-01-12 ENCOUNTER — IMMUNIZATION (OUTPATIENT)
Dept: FAMILY MEDICINE | Facility: CLINIC | Age: 76
End: 2021-01-12
Payer: MEDICARE

## 2021-01-12 DIAGNOSIS — Z23 NEED FOR VACCINATION: ICD-10-CM

## 2021-01-12 PROCEDURE — 91300 COVID-19, MRNA, LNP-S, PF, 30 MCG/0.3 ML DOSE VACCINE: CPT | Mod: PBBFAC,PO

## 2021-02-02 ENCOUNTER — IMMUNIZATION (OUTPATIENT)
Dept: FAMILY MEDICINE | Facility: CLINIC | Age: 76
End: 2021-02-02
Payer: MEDICARE

## 2021-02-02 DIAGNOSIS — Z23 NEED FOR VACCINATION: Primary | ICD-10-CM

## 2021-02-02 PROCEDURE — 0002A COVID-19, MRNA, LNP-S, PF, 30 MCG/0.3 ML DOSE VACCINE: CPT | Mod: PBBFAC,PO

## 2021-02-02 PROCEDURE — 91300 COVID-19, MRNA, LNP-S, PF, 30 MCG/0.3 ML DOSE VACCINE: CPT | Mod: PBBFAC,PO

## 2021-03-16 ENCOUNTER — OFFICE VISIT (OUTPATIENT)
Dept: DERMATOLOGY | Facility: CLINIC | Age: 76
End: 2021-03-16
Payer: MEDICARE

## 2021-03-16 ENCOUNTER — OFFICE VISIT (OUTPATIENT)
Dept: OTOLARYNGOLOGY | Facility: CLINIC | Age: 76
End: 2021-03-16
Payer: MEDICARE

## 2021-03-16 VITALS — BODY MASS INDEX: 31.47 KG/M2 | RESPIRATION RATE: 20 BRPM | HEIGHT: 70 IN

## 2021-03-16 VITALS — WEIGHT: 219.38 LBS | BODY MASS INDEX: 31.41 KG/M2 | HEIGHT: 70 IN

## 2021-03-16 DIAGNOSIS — L90.5 SCAR: Primary | ICD-10-CM

## 2021-03-16 DIAGNOSIS — Z85.828 PERSONAL HISTORY OF OTHER MALIGNANT NEOPLASM OF SKIN: ICD-10-CM

## 2021-03-16 DIAGNOSIS — L82.1 SEBORRHEIC KERATOSES: ICD-10-CM

## 2021-03-16 DIAGNOSIS — L57.0 ACTINIC KERATOSES: ICD-10-CM

## 2021-03-16 DIAGNOSIS — D48.5 NEOPLASM OF UNCERTAIN BEHAVIOR OF SKIN: ICD-10-CM

## 2021-03-16 DIAGNOSIS — J31.0 RHINITIS, UNSPECIFIED TYPE: Primary | ICD-10-CM

## 2021-03-16 DIAGNOSIS — J32.9 SINUSITIS, UNSPECIFIED CHRONICITY, UNSPECIFIED LOCATION: ICD-10-CM

## 2021-03-16 PROCEDURE — 99213 OFFICE O/P EST LOW 20 MIN: CPT | Mod: PBBFAC,PO,25 | Performed by: DERMATOLOGY

## 2021-03-16 PROCEDURE — 17000 DESTRUCT PREMALG LESION: CPT | Mod: 59,S$PBB,, | Performed by: DERMATOLOGY

## 2021-03-16 PROCEDURE — 99999 PR PBB SHADOW E&M-EST. PATIENT-LVL III: ICD-10-PCS | Mod: PBBFAC,,, | Performed by: DERMATOLOGY

## 2021-03-16 PROCEDURE — 88305 TISSUE EXAM BY PATHOLOGIST: CPT | Performed by: PATHOLOGY

## 2021-03-16 PROCEDURE — 88305 TISSUE EXAM BY PATHOLOGIST: ICD-10-PCS | Mod: 26,,, | Performed by: PATHOLOGY

## 2021-03-16 PROCEDURE — 17003 DESTRUCTION, PREMALIGNANT LESIONS; SECOND THROUGH 14 LESIONS: ICD-10-PCS | Mod: S$PBB,,, | Performed by: DERMATOLOGY

## 2021-03-16 PROCEDURE — 99999 PR PBB SHADOW E&M-EST. PATIENT-LVL III: ICD-10-PCS | Mod: PBBFAC,,, | Performed by: OTOLARYNGOLOGY

## 2021-03-16 PROCEDURE — 11102 PR TANGENTIAL BIOPSY, SKIN, SINGLE LESION: ICD-10-PCS | Mod: S$PBB,,, | Performed by: DERMATOLOGY

## 2021-03-16 PROCEDURE — 11102 TANGNTL BX SKIN SINGLE LES: CPT | Mod: S$PBB,,, | Performed by: DERMATOLOGY

## 2021-03-16 PROCEDURE — 88305 TISSUE EXAM BY PATHOLOGIST: CPT | Mod: 26,,, | Performed by: PATHOLOGY

## 2021-03-16 PROCEDURE — 99999 PR PBB SHADOW E&M-EST. PATIENT-LVL III: CPT | Mod: PBBFAC,,, | Performed by: OTOLARYNGOLOGY

## 2021-03-16 PROCEDURE — 17000 DESTRUCT PREMALG LESION: CPT | Mod: 59,PBBFAC,PO | Performed by: DERMATOLOGY

## 2021-03-16 PROCEDURE — 11102 TANGNTL BX SKIN SINGLE LES: CPT | Mod: PBBFAC,PO | Performed by: DERMATOLOGY

## 2021-03-16 PROCEDURE — 99999 PR PBB SHADOW E&M-EST. PATIENT-LVL III: CPT | Mod: PBBFAC,,, | Performed by: DERMATOLOGY

## 2021-03-16 PROCEDURE — 99204 PR OFFICE/OUTPT VISIT, NEW, LEVL IV, 45-59 MIN: ICD-10-PCS | Mod: 25,S$PBB,, | Performed by: OTOLARYNGOLOGY

## 2021-03-16 PROCEDURE — 31231 PR NASAL ENDOSCOPY, DX: ICD-10-PCS | Mod: S$PBB,,, | Performed by: OTOLARYNGOLOGY

## 2021-03-16 PROCEDURE — 99213 OFFICE O/P EST LOW 20 MIN: CPT | Mod: 25,S$PBB,, | Performed by: DERMATOLOGY

## 2021-03-16 PROCEDURE — 99213 PR OFFICE/OUTPT VISIT, EST, LEVL III, 20-29 MIN: ICD-10-PCS | Mod: 25,S$PBB,, | Performed by: DERMATOLOGY

## 2021-03-16 PROCEDURE — 17000 PR DESTRUCTION(LASER SURGERY,CRYOSURGERY,CHEMOSURGERY),PREMALIGNANT LESIONS,FIRST LESION: ICD-10-PCS | Mod: 59,S$PBB,, | Performed by: DERMATOLOGY

## 2021-03-16 PROCEDURE — 99213 OFFICE O/P EST LOW 20 MIN: CPT | Mod: PBBFAC,25,27,PO | Performed by: OTOLARYNGOLOGY

## 2021-03-16 PROCEDURE — 99204 OFFICE O/P NEW MOD 45 MIN: CPT | Mod: 25,S$PBB,, | Performed by: OTOLARYNGOLOGY

## 2021-03-16 PROCEDURE — 17003 DESTRUCT PREMALG LES 2-14: CPT | Mod: 59,PBBFAC,PO | Performed by: DERMATOLOGY

## 2021-03-16 PROCEDURE — 31231 NASAL ENDOSCOPY DX: CPT | Mod: S$PBB,,, | Performed by: OTOLARYNGOLOGY

## 2021-03-16 PROCEDURE — 87070 CULTURE OTHR SPECIMN AEROBIC: CPT | Performed by: OTOLARYNGOLOGY

## 2021-03-16 PROCEDURE — 17003 DESTRUCT PREMALG LES 2-14: CPT | Mod: S$PBB,,, | Performed by: DERMATOLOGY

## 2021-03-16 PROCEDURE — 31231 NASAL ENDOSCOPY DX: CPT | Mod: PBBFAC,PO | Performed by: OTOLARYNGOLOGY

## 2021-03-16 RX ORDER — PREDNISONE 20 MG/1
40 TABLET ORAL DAILY
Qty: 14 TABLET | Refills: 0 | Status: SHIPPED | OUTPATIENT
Start: 2021-03-16 | End: 2021-03-23

## 2021-03-16 RX ORDER — AZELASTINE 1 MG/ML
2 SPRAY, METERED NASAL 2 TIMES DAILY PRN
Qty: 30 ML | Refills: 6 | Status: SHIPPED | OUTPATIENT
Start: 2021-03-16 | End: 2022-10-12

## 2021-03-19 LAB
BACTERIA SPEC AEROBE CULT: NORMAL
FINAL PATHOLOGIC DIAGNOSIS: NORMAL
GROSS: NORMAL
MICROSCOPIC EXAM: NORMAL

## 2021-03-19 RX ORDER — CEFDINIR 300 MG/1
300 CAPSULE ORAL 2 TIMES DAILY
Qty: 20 CAPSULE | Refills: 0 | Status: SHIPPED | OUTPATIENT
Start: 2021-03-19 | End: 2021-03-29

## 2021-03-22 ENCOUNTER — PATIENT MESSAGE (OUTPATIENT)
Dept: DERMATOLOGY | Facility: CLINIC | Age: 76
End: 2021-03-22

## 2021-03-22 RX ORDER — FLUOROURACIL 50 MG/G
CREAM TOPICAL 2 TIMES DAILY
Qty: 40 G | Refills: 1 | Status: SHIPPED | OUTPATIENT
Start: 2021-03-22 | End: 2021-07-13

## 2021-04-27 ENCOUNTER — OFFICE VISIT (OUTPATIENT)
Dept: OTOLARYNGOLOGY | Facility: CLINIC | Age: 76
End: 2021-04-27
Payer: MEDICARE

## 2021-04-27 VITALS — WEIGHT: 218.5 LBS | HEIGHT: 70 IN | BODY MASS INDEX: 31.28 KG/M2

## 2021-04-27 DIAGNOSIS — J34.3 HYPERTROPHY OF BOTH INFERIOR NASAL TURBINATES: ICD-10-CM

## 2021-04-27 DIAGNOSIS — J30.9 ALLERGIC RHINITIS, UNSPECIFIED SEASONALITY, UNSPECIFIED TRIGGER: Primary | ICD-10-CM

## 2021-04-27 PROCEDURE — 99214 OFFICE O/P EST MOD 30 MIN: CPT | Mod: 25,S$PBB,, | Performed by: OTOLARYNGOLOGY

## 2021-04-27 PROCEDURE — 31231 PR NASAL ENDOSCOPY, DX: ICD-10-PCS | Mod: S$PBB,,, | Performed by: OTOLARYNGOLOGY

## 2021-04-27 PROCEDURE — 99999 PR PBB SHADOW E&M-EST. PATIENT-LVL III: CPT | Mod: PBBFAC,,, | Performed by: OTOLARYNGOLOGY

## 2021-04-27 PROCEDURE — 31231 NASAL ENDOSCOPY DX: CPT | Mod: PBBFAC,PO | Performed by: OTOLARYNGOLOGY

## 2021-04-27 PROCEDURE — 31231 NASAL ENDOSCOPY DX: CPT | Mod: S$PBB,,, | Performed by: OTOLARYNGOLOGY

## 2021-04-27 PROCEDURE — 99213 OFFICE O/P EST LOW 20 MIN: CPT | Mod: PBBFAC,PO,25 | Performed by: OTOLARYNGOLOGY

## 2021-04-27 PROCEDURE — 99214 PR OFFICE/OUTPT VISIT, EST, LEVL IV, 30-39 MIN: ICD-10-PCS | Mod: 25,S$PBB,, | Performed by: OTOLARYNGOLOGY

## 2021-04-27 PROCEDURE — 99999 PR PBB SHADOW E&M-EST. PATIENT-LVL III: ICD-10-PCS | Mod: PBBFAC,,, | Performed by: OTOLARYNGOLOGY

## 2021-04-27 RX ORDER — FLUTICASONE PROPIONATE 50 MCG
2 SPRAY, SUSPENSION (ML) NASAL 2 TIMES DAILY
Qty: 32 G | Refills: 11 | Status: SHIPPED | OUTPATIENT
Start: 2021-04-27 | End: 2023-03-08

## 2021-05-10 ENCOUNTER — TELEPHONE (OUTPATIENT)
Dept: OTOLARYNGOLOGY | Facility: CLINIC | Age: 76
End: 2021-05-10

## 2021-05-13 ENCOUNTER — PATIENT MESSAGE (OUTPATIENT)
Dept: OTOLARYNGOLOGY | Facility: CLINIC | Age: 76
End: 2021-05-13

## 2021-05-20 ENCOUNTER — PATIENT MESSAGE (OUTPATIENT)
Dept: OTOLARYNGOLOGY | Facility: CLINIC | Age: 76
End: 2021-05-20

## 2021-06-22 ENCOUNTER — OFFICE VISIT (OUTPATIENT)
Dept: DERMATOLOGY | Facility: CLINIC | Age: 76
End: 2021-06-22
Payer: MEDICARE

## 2021-06-22 VITALS — HEIGHT: 70 IN | BODY MASS INDEX: 31.28 KG/M2 | RESPIRATION RATE: 18 BRPM | WEIGHT: 218.5 LBS

## 2021-06-22 DIAGNOSIS — L90.5 SCAR: Primary | ICD-10-CM

## 2021-06-22 DIAGNOSIS — L57.0 ACTINIC KERATOSES: ICD-10-CM

## 2021-06-22 PROCEDURE — 99999 PR PBB SHADOW E&M-EST. PATIENT-LVL III: CPT | Mod: PBBFAC,,, | Performed by: DERMATOLOGY

## 2021-06-22 PROCEDURE — 99213 OFFICE O/P EST LOW 20 MIN: CPT | Mod: PBBFAC,PO | Performed by: DERMATOLOGY

## 2021-06-22 PROCEDURE — 99999 PR PBB SHADOW E&M-EST. PATIENT-LVL III: ICD-10-PCS | Mod: PBBFAC,,, | Performed by: DERMATOLOGY

## 2021-06-22 PROCEDURE — 99214 PR OFFICE/OUTPT VISIT, EST, LEVL IV, 30-39 MIN: ICD-10-PCS | Mod: S$PBB,,, | Performed by: DERMATOLOGY

## 2021-06-22 PROCEDURE — 99214 OFFICE O/P EST MOD 30 MIN: CPT | Mod: S$PBB,,, | Performed by: DERMATOLOGY

## 2021-11-12 ENCOUNTER — HOSPITAL ENCOUNTER (OUTPATIENT)
Dept: RADIOLOGY | Facility: HOSPITAL | Age: 76
Discharge: HOME | End: 2021-11-12
Attending: PSYCHIATRY & NEUROLOGY
Payer: MEDICARE

## 2021-11-12 DIAGNOSIS — R42 ORTHOSTATIC DIZZINESS: ICD-10-CM

## 2021-11-12 PROCEDURE — 93880 US CAROTID BILATERAL: ICD-10-PCS | Mod: 26,,, | Performed by: RADIOLOGY

## 2021-11-12 PROCEDURE — 93880 EXTRACRANIAL BILAT STUDY: CPT | Mod: TC,PO

## 2021-11-12 PROCEDURE — 93880 EXTRACRANIAL BILAT STUDY: CPT | Mod: 26,,, | Performed by: RADIOLOGY

## 2022-02-22 DIAGNOSIS — D84.9 IMMUNOSUPPRESSED STATUS: ICD-10-CM

## 2022-04-12 ENCOUNTER — CLINICAL SUPPORT (OUTPATIENT)
Dept: AUDIOLOGY | Facility: CLINIC | Age: 77
End: 2022-04-12
Payer: MEDICARE

## 2022-04-12 ENCOUNTER — OFFICE VISIT (OUTPATIENT)
Dept: FAMILY MEDICINE | Facility: CLINIC | Age: 77
End: 2022-04-12
Payer: MEDICARE

## 2022-04-12 VITALS
HEART RATE: 64 BPM | BODY MASS INDEX: 28.87 KG/M2 | SYSTOLIC BLOOD PRESSURE: 120 MMHG | HEIGHT: 70 IN | DIASTOLIC BLOOD PRESSURE: 78 MMHG | WEIGHT: 201.63 LBS

## 2022-04-12 DIAGNOSIS — M50.30 DEGENERATIVE DISC DISEASE, CERVICAL: Primary | ICD-10-CM

## 2022-04-12 DIAGNOSIS — H91.90 HEARING DIFFICULTY, UNSPECIFIED LATERALITY: Primary | ICD-10-CM

## 2022-04-12 DIAGNOSIS — H90.3 SENSORINEURAL HEARING LOSS (SNHL) OF BOTH EARS: Primary | ICD-10-CM

## 2022-04-12 DIAGNOSIS — H91.90 HEARING DIFFICULTY, UNSPECIFIED LATERALITY: ICD-10-CM

## 2022-04-12 DIAGNOSIS — H93.19 TINNITUS, UNSPECIFIED LATERALITY: ICD-10-CM

## 2022-04-12 DIAGNOSIS — C91.10 CLL (CHRONIC LYMPHOCYTIC LEUKEMIA): ICD-10-CM

## 2022-04-12 DIAGNOSIS — Z71.89 HEARING AID CONSULTATION: Primary | ICD-10-CM

## 2022-04-12 PROCEDURE — 99999 PR PBB SHADOW E&M-EST. PATIENT-LVL I: ICD-10-PCS | Mod: PBBFAC,,,

## 2022-04-12 PROCEDURE — 99499 NO LOS: ICD-10-PCS | Mod: S$PBB,,, | Performed by: AUDIOLOGIST-HEARING AID FITTER

## 2022-04-12 PROCEDURE — 99214 PR OFFICE/OUTPT VISIT, EST, LEVL IV, 30-39 MIN: ICD-10-PCS | Mod: S$PBB,,, | Performed by: FAMILY MEDICINE

## 2022-04-12 PROCEDURE — 99214 OFFICE O/P EST MOD 30 MIN: CPT | Mod: S$PBB,,, | Performed by: FAMILY MEDICINE

## 2022-04-12 PROCEDURE — 92567 TYMPANOMETRY: CPT | Mod: PBBFAC,PO | Performed by: AUDIOLOGIST

## 2022-04-12 PROCEDURE — 99999 PR PBB SHADOW E&M-EST. PATIENT-LVL I: CPT | Mod: PBBFAC,,,

## 2022-04-12 PROCEDURE — 99499 UNLISTED E&M SERVICE: CPT | Mod: S$PBB,,, | Performed by: AUDIOLOGIST-HEARING AID FITTER

## 2022-04-12 PROCEDURE — 99999 PR PBB SHADOW E&M-EST. PATIENT-LVL III: CPT | Mod: PBBFAC,,, | Performed by: FAMILY MEDICINE

## 2022-04-12 PROCEDURE — 99999 PR PBB SHADOW E&M-EST. PATIENT-LVL III: ICD-10-PCS | Mod: PBBFAC,,, | Performed by: FAMILY MEDICINE

## 2022-04-12 PROCEDURE — 92557 COMPREHENSIVE HEARING TEST: CPT | Mod: PBBFAC,PO | Performed by: AUDIOLOGIST

## 2022-04-12 PROCEDURE — 99213 OFFICE O/P EST LOW 20 MIN: CPT | Mod: PBBFAC,PO | Performed by: FAMILY MEDICINE

## 2022-04-12 PROCEDURE — 99211 OFF/OP EST MAY X REQ PHY/QHP: CPT | Mod: PBBFAC,27,PO

## 2022-04-12 RX ORDER — PANTOPRAZOLE SODIUM 40 MG/1
TABLET, DELAYED RELEASE ORAL
COMMUNITY
Start: 2022-03-14 | End: 2024-02-01 | Stop reason: SDUPTHER

## 2022-04-12 RX ORDER — TRAMADOL HYDROCHLORIDE 50 MG/1
50 TABLET ORAL EVERY 6 HOURS PRN
Qty: 60 TABLET | Refills: 2 | Status: SHIPPED | OUTPATIENT
Start: 2022-04-12 | End: 2022-09-27

## 2022-04-12 RX ORDER — FINASTERIDE 5 MG/1
5 TABLET, FILM COATED ORAL DAILY
COMMUNITY
Start: 2021-05-12 | End: 2024-02-01 | Stop reason: SDUPTHER

## 2022-04-12 NOTE — PROGRESS NOTES
Eligio Richardson was seen on 04/12/2022 for a hearing aid consultation. Patient's audiogram was discussed in detail. We also discussed the different brands, sizes and levels of technology. It was decided based on the patients lifestyle that the best choice would be Phonak Audeo P90-R in color P7 with size 2M receivers AU. The price quoted was $5004.00 with  discount tax for 2 aids. Pt was informed that the hearing test would be valid for 6 months for the purchase of hearing aids and that they would need to pay for the hearing aids in full at time of fitting. Pt was also informed that insurance reimbursement by their insurance company for any hearing aid benefits would need to be filed for by the patient since [facility] does not file for insurance benefits for hearing aids at this time. The pt scheduled a fitting on 4/19/2022.

## 2022-04-12 NOTE — PROGRESS NOTES
HPI: Here to establish care. 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 He has chronic constipation so opioids are not a good fit. CLL is under the care of [facility]. He has prostate cancer and undergoes active surveillance also at [facility].           Review of Systems   Constitutional: Negative.    HENT: Negative.    Eyes: Negative.    Respiratory: Negative.    Cardiovascular: Negative.    Gastrointestinal: Positive for constipation.   Genitourinary: Negative.    Musculoskeletal: Positive for back pain, joint pain and neck pain.   Skin: Negative.    Neurological: Negative.    Psychiatric/Behavioral: Negative.         Physical Exam  Constitutional:       Appearance: Normal appearance.   HENT:      Head: Normocephalic and atraumatic.      Nose: Nose normal.      Mouth/Throat:      Mouth: Mucous membranes are dry.   Eyes:      Extraocular Movements: Extraocular movements intact.      Pupils: Pupils are equal, round, and reactive to light.   Cardiovascular:      Rate and Rhythm: Normal rate and regular rhythm.      Pulses: Normal pulses.      Heart sounds: Normal heart sounds.   Pulmonary:      Breath sounds: Normal breath sounds.   Abdominal:      General: Abdomen is flat. Bowel sounds are normal.      Palpations: Abdomen is soft.   Musculoskeletal:         General: Normal range of motion.      Cervical back: Normal range of motion and neck supple.   Skin:     General: Skin is warm and dry.   Neurological:      General: No focal deficit present.      Mental Status: He is alert and oriented to person, place, and time.   Psychiatric:         Mood and Affect: Mood normal.         Behavior: Behavior normal.         Thought Content: Thought content normal.         Judgment: Judgment normal.          Degenerative disc disease, cervical  -     traMADoL (ULTRAM) 50 mg tablet; Take 1 tablet (50 mg total) by mouth every 6 (six) hours as needed for Pain.  Dispense: 60 tablet; Refill: 2    CLL (chronic  lymphocytic leukemia)  Comments:  Continued care for CLL.

## 2022-04-19 ENCOUNTER — CLINICAL SUPPORT (OUTPATIENT)
Dept: AUDIOLOGY | Facility: CLINIC | Age: 77
End: 2022-04-19
Payer: MEDICARE

## 2022-04-19 DIAGNOSIS — Z46.1 ENCOUNTER FOR HEARING AID FITTING OF BOTH EARS: Primary | ICD-10-CM

## 2022-04-19 PROCEDURE — V5140 PR BEHIND EAR BINAUR HEARING AI: ICD-10-PCS | Mod: CSM,S$GLB,, | Performed by: AUDIOLOGIST-HEARING AID FITTER

## 2022-04-19 PROCEDURE — V5140 BEHIND EAR BINAUR HEARING AI: HCPCS | Mod: CSM,S$GLB,, | Performed by: AUDIOLOGIST-HEARING AID FITTER

## 2022-04-19 PROCEDURE — COVTAX COVINGTON PRESCRIBED 4.25%: Mod: CSM,S$GLB,, | Performed by: AUDIOLOGIST-HEARING AID FITTER

## 2022-04-19 PROCEDURE — COVTAX COVINGTON PRESCRIBED 4.25%: ICD-10-PCS | Mod: CSM,S$GLB,, | Performed by: AUDIOLOGIST-HEARING AID FITTER

## 2022-04-19 NOTE — PROGRESS NOTES
Eligio GUILLERMO Tobiasel was seen on 04/19/2022  for a hearing aid fitting.     HA model and style: Phonak Audeo P90-R in color P7  Right S/N: [id number]  Left S/N: [id number]   size: 2M AU  Dome size: med power    Repair warranty and L&D coverage expires: 7/10/25    Set target gain to auto from % over 7 days with instructions that pt will not be hearing to total capacity for a couple weeks when the target gain is increased, performed feedback test, set occlusion compensation to weak and demonstrated low battery signal to pt, decreased own voice by 3 and activated SILN with instructions on use. Paired the patient's hearing aids with their cell phone and performed a test call to demonstrate function. Had pt play music through the phone to demonstrate streaming capabilities. Had pt download the [employer] Remote burton, paired the aids within the burton and demonstrated function of the burton. Gave the handout for reference. Reviewed insertion and removal until pt felt comfortable with the process. Patient was able to manipulate hearing aids well and reported satisfaction with sound quality so far.  He was counseled on realistic expectations and acclimation strategies.  He was given a copy of the hearing aid purchase agreement and will pay in full today.  He was reminded that he should file for insurance reimbursement on his own since we do not take insurance coverage for hearing aids at this time. Patient's one month trial period will begin today and was informed of the 30 day return policy with a nonrefundable $200 fitting fee, if returned.  He will follow up for adjustments to the settings and instructions on cleaning the aids. Pt will return on 5/26/2022 at 9:00 for his next follow up.

## 2022-05-09 ENCOUNTER — PATIENT MESSAGE (OUTPATIENT)
Dept: SMOKING CESSATION | Facility: CLINIC | Age: 77
End: 2022-05-09
Payer: MEDICARE

## 2022-05-26 ENCOUNTER — CLINICAL SUPPORT (OUTPATIENT)
Dept: AUDIOLOGY | Facility: CLINIC | Age: 77
End: 2022-05-26
Payer: MEDICARE

## 2022-05-26 DIAGNOSIS — Z97.4 WEARS HEARING AID IN BOTH EARS: Primary | ICD-10-CM

## 2022-05-26 PROCEDURE — 99499 UNLISTED E&M SERVICE: CPT | Mod: S$PBB,,, | Performed by: AUDIOLOGIST-HEARING AID FITTER

## 2022-05-26 PROCEDURE — 99499 NO LOS: ICD-10-PCS | Mod: S$PBB,,, | Performed by: AUDIOLOGIST-HEARING AID FITTER

## 2022-05-26 NOTE — PROGRESS NOTES
Eligio Richardson came in on 05/26/2022 for a hearing aid follow up. Pt stated the right aid is not working. Inspection revealed the filter to be full of wax. Demonstrated cleaning of hearing aids to include wax filter and dome change with the pt doing it at the same time with their hearing aids for repetition purposes. The wax filter change handout was given to reinforce instructions to include a [facility] web address for reference. He endorsed the aids are working properly again since cleaning them. Informed pt that a notification will be mailed when it is time for his annual hearing test and that he should call the audiology clinic directly to schedule the appts to coordinate them correctly. Also informed his that if domes or wax filters are needed, call the clinic and we will leave them at the 2nd floor check in desk to be picked up at the earliest convenience. He should call the clinic PRN otherwise.

## 2022-07-20 ENCOUNTER — OFFICE VISIT (OUTPATIENT)
Dept: FAMILY MEDICINE | Facility: CLINIC | Age: 77
End: 2022-07-20
Payer: MEDICARE

## 2022-07-20 VITALS
HEIGHT: 70 IN | WEIGHT: 197.75 LBS | HEART RATE: 61 BPM | BODY MASS INDEX: 28.31 KG/M2 | DIASTOLIC BLOOD PRESSURE: 64 MMHG | SYSTOLIC BLOOD PRESSURE: 128 MMHG | OXYGEN SATURATION: 96 %

## 2022-07-20 DIAGNOSIS — G89.29 CHRONIC BACK PAIN GREATER THAN 3 MONTHS DURATION: Primary | ICD-10-CM

## 2022-07-20 DIAGNOSIS — N18.31 CHRONIC KIDNEY DISEASE, STAGE 3A: ICD-10-CM

## 2022-07-20 DIAGNOSIS — M54.9 CHRONIC BACK PAIN GREATER THAN 3 MONTHS DURATION: Primary | ICD-10-CM

## 2022-07-20 DIAGNOSIS — C91.10 CLL (CHRONIC LYMPHOCYTIC LEUKEMIA): ICD-10-CM

## 2022-07-20 DIAGNOSIS — C61 PROSTATE CANCER: ICD-10-CM

## 2022-07-20 PROCEDURE — 99999 PR PBB SHADOW E&M-EST. PATIENT-LVL IV: CPT | Mod: PBBFAC,,, | Performed by: PHYSICIAN ASSISTANT

## 2022-07-20 PROCEDURE — 99214 OFFICE O/P EST MOD 30 MIN: CPT | Mod: PBBFAC,PO | Performed by: PHYSICIAN ASSISTANT

## 2022-07-20 PROCEDURE — 99999 PR PBB SHADOW E&M-EST. PATIENT-LVL IV: ICD-10-PCS | Mod: PBBFAC,,, | Performed by: PHYSICIAN ASSISTANT

## 2022-07-20 PROCEDURE — 99214 PR OFFICE/OUTPT VISIT, EST, LEVL IV, 30-39 MIN: ICD-10-PCS | Mod: S$PBB,,, | Performed by: PHYSICIAN ASSISTANT

## 2022-07-20 PROCEDURE — 99214 OFFICE O/P EST MOD 30 MIN: CPT | Mod: S$PBB,,, | Performed by: PHYSICIAN ASSISTANT

## 2022-07-20 NOTE — PROGRESS NOTES
"Subjective:      Patient ID: Eligio Richardson is a 77 y.o. male.    Chief Complaint: Follow-up (Wants new pcp; and review labs from [facility] (has paper with results))    Patient is new to me.    HPI   Patient has PMH of CLL, prostate cancer, and hypothyroidism.    Chronic back pain/spinal stenosis-tramadol has not been helped pain.  Was taking celebrex with improvement.  Can no longer walk due to back issues.  Has spinal surgeon that is managing this.    Prostate Cancer/CLL-[facility].  Monitoring for 3 years.  No chemo or radiation for this.    Reviewed labwork from [facility].  CKD3a    Goes to Dr. Clark for pain management-takes Norco for this.    Drinking diet coke and taking BC powder daily.    Review of Systems   Constitutional: Negative for appetite change, chills and fever.   Respiratory: Negative for shortness of breath.    Cardiovascular: Negative for chest pain.   Gastrointestinal: Positive for abdominal pain (intermittent LUQ pain) and constipation. Negative for blood in stool, diarrhea, nausea and vomiting.   Genitourinary: Negative for hematuria.   Musculoskeletal: Positive for back pain, gait problem and neck pain.   Neurological: Positive for headaches (baseline-neck pain). Negative for dizziness and light-headedness.       Objective:   /64   Pulse 61   Ht 5' 10" (1.778 m)   Wt 89.7 kg (197 lb 12 oz)   SpO2 96%   BMI 28.37 kg/m²      Physical Exam  Vitals reviewed.   Constitutional:       General: He is not in acute distress.     Appearance: Normal appearance. He is well-developed and well-groomed.   HENT:      Head: Normocephalic and atraumatic.      Right Ear: Hearing and external ear normal.      Left Ear: Hearing and external ear normal.      Nose: Nose normal.      Mouth/Throat:      Lips: Pink.   Eyes:      General: Lids are normal.      Conjunctiva/sclera: Conjunctivae normal.   Neck:      Trachea: Phonation normal.   Cardiovascular:      Rate and Rhythm: Normal rate and regular " rhythm.      Heart sounds: Murmur heard.     No friction rub. No gallop.   Pulmonary:      Effort: Pulmonary effort is normal. No respiratory distress.      Breath sounds: Normal breath sounds. No decreased breath sounds, wheezing, rhonchi or rales.   Abdominal:      Palpations: Abdomen is soft.      Tenderness: There is no abdominal tenderness.   Musculoskeletal:      Cervical back: Normal range of motion.      Right lower leg: No edema.      Left lower leg: No edema.      Comments: Using wheelchair in visit.   Skin:     General: Skin is warm and dry.      Findings: No rash.   Neurological:      General: No focal deficit present.      Mental Status: He is alert and oriented to person, place, and time.   Psychiatric:         Attention and Perception: Attention normal.         Mood and Affect: Mood normal.         Speech: Speech normal.         Behavior: Behavior normal. Behavior is cooperative.         Judgment: Judgment normal.        Assessment:      1. Chronic back pain greater than 3 months duration    2. Chronic kidney disease, stage 3a    3. CLL (chronic lymphocytic leukemia)    4. Prostate cancer       Plan:   1. Chronic back pain greater than 3 months duration  Managed by spinal surgeon.    2. Chronic kidney disease, stage 3a  Continued.  Limit diet coke, drink more water, and discontinue BC powder.      3. CLL (chronic lymphocytic leukemia)  Stable.  [facility] manages this.    4. Prostate cancer  Stable.  [facility] manages this.    Follow up in 2 months to establish with new PCP.  Patient agreed with plan and expressed understanding.    Thank you for allowing me to serve you,

## 2022-07-20 NOTE — PATIENT INSTRUCTIONS
Decreasing BC powder and decreasing diet coke to one a day.    Thanks for seeing me,  Heidi Fall PA-C

## 2022-07-28 ENCOUNTER — TELEPHONE (OUTPATIENT)
Dept: CARDIOLOGY | Facility: CLINIC | Age: 77
End: 2022-07-28
Payer: MEDICARE

## 2022-07-28 NOTE — TELEPHONE ENCOUNTER
----- Message from Karly Zamudio sent at 7/28/2022  2:35 PM CDT -----  Type:  Sooner Appointment Request    Caller is requesting a sooner appointment.  Caller declined first available appointment listed below.  Caller will not accept being placed on the waitlist and is requesting a message be sent to doctor.    Name of Caller:  patient   When is the first available appointment?  Unavailable  Symptoms:  Est care/provider left  Best Call Back Number:  [phone]   Additional Information:  please advise-thank you

## 2022-07-29 ENCOUNTER — TELEPHONE (OUTPATIENT)
Dept: CARDIOLOGY | Facility: CLINIC | Age: 77
End: 2022-07-29
Payer: MEDICARE

## 2022-07-29 NOTE — TELEPHONE ENCOUNTER
----- Message from Yenny Rodriguez sent at 7/29/2022 10:17 AM CDT -----  Contact: pt at   Type:  Sooner Appointment Request    Caller is requesting a sooner appointment.  Caller declined first available appointment listed below.  Caller will not accept being placed on the waitlist and is requesting a message be sent to doctor.    Name of Caller: Annie/ Wife  When is the first available appointment?  09/09/22  Symptoms:  Irregular heartbeat/Est care  Best Call Back Number:  [phone]    Additional Information:  Annie/wife is calling the office to get a sooner appt for pt.. Adv his prior cardiologist retired and his medication is running low.. Adv they would like to come in ASAP- they only have 7 pills. Please call and adv

## 2022-08-01 ENCOUNTER — TELEPHONE (OUTPATIENT)
Dept: CARDIOLOGY | Facility: CLINIC | Age: 77
End: 2022-08-01
Payer: MEDICARE

## 2022-08-01 NOTE — TELEPHONE ENCOUNTER
----- Message from Yesenia Cloud sent at 8/1/2022 12:06 PM CDT -----  Contact: Wife Annie  Type:  Patient Returning Call    Who Called:  Annie   Who Left Message for Patient:  Janelle   Does the patient know what this is regarding?:  scheduling a sooner appt  Best Call Back Number:  [phone] (home)   Additional Information:  Thanks he is due to leave for [facility] on 8/9 and only has 3 pills left of his medicine so needs help.

## 2022-08-03 RX ORDER — ACEBUTOLOL HYDROCHLORIDE 400 MG/1
CAPSULE ORAL
Qty: 180 CAPSULE | Refills: 5 | OUTPATIENT
Start: 2022-08-03

## 2022-08-03 NOTE — TELEPHONE ENCOUNTER
Refill Routing Note   Medication(s) are not appropriate for processing by [facility] for the following reason(s):      - no pcp listed on profile; unclear if pt est care  - Patient has been seen in the ED/Hospital since the last PCP visit    [facility] action(s):  Defer          Medication reconciliation completed: No     Appointments  past 12m or future 3m with PCP    Date Provider   Last Visit   4/12/2022 Jose David Costa MD   Next Visit   Visit date not found Jose David Costa MD   ED visits in past 90 days: 0        Note composed:5:00 PM 08/03/2022

## 2022-08-08 NOTE — PROGRESS NOTES
Patient coming in for grider removal. Withdrew 10cc sterile water from balloon and removed 16fr grider catheter, intact. Provided voiding trial instruction. Pt tolerated well and expressed understanding.   
no

## 2022-09-12 ENCOUNTER — OFFICE VISIT (OUTPATIENT)
Dept: CARDIOLOGY | Facility: CLINIC | Age: 77
End: 2022-09-12
Payer: MEDICARE

## 2022-09-12 VITALS
DIASTOLIC BLOOD PRESSURE: 64 MMHG | HEIGHT: 70 IN | HEART RATE: 80 BPM | SYSTOLIC BLOOD PRESSURE: 107 MMHG | WEIGHT: 194.88 LBS | BODY MASS INDEX: 27.9 KG/M2

## 2022-09-12 DIAGNOSIS — R94.31 NONSPECIFIC ABNORMAL ELECTROCARDIOGRAM (ECG) (EKG): ICD-10-CM

## 2022-09-12 DIAGNOSIS — I10 PRIMARY HYPERTENSION: Chronic | ICD-10-CM

## 2022-09-12 DIAGNOSIS — I49.49 PREMATURE BEATS: Chronic | ICD-10-CM

## 2022-09-12 DIAGNOSIS — R53.83 OTHER FATIGUE: ICD-10-CM

## 2022-09-12 DIAGNOSIS — E66.3 OVERWEIGHT (BMI 25.0-29.9): Chronic | ICD-10-CM

## 2022-09-12 DIAGNOSIS — R20.9 COLD LEFT FOOT: Chronic | ICD-10-CM

## 2022-09-12 DIAGNOSIS — R01.1 SYSTOLIC MURMUR: ICD-10-CM

## 2022-09-12 DIAGNOSIS — N18.31 STAGE 3A CHRONIC KIDNEY DISEASE: Chronic | ICD-10-CM

## 2022-09-12 DIAGNOSIS — R55 NEAR SYNCOPE: Primary | ICD-10-CM

## 2022-09-12 DIAGNOSIS — R06.02 SOB (SHORTNESS OF BREATH): ICD-10-CM

## 2022-09-12 PROCEDURE — 93010 ELECTROCARDIOGRAM REPORT: CPT | Mod: ,,, | Performed by: INTERNAL MEDICINE

## 2022-09-12 PROCEDURE — 99204 PR OFFICE/OUTPT VISIT, NEW, LEVL IV, 45-59 MIN: ICD-10-PCS | Mod: S$PBB,,, | Performed by: INTERNAL MEDICINE

## 2022-09-12 PROCEDURE — 99999 PR PBB SHADOW E&M-EST. PATIENT-LVL III: CPT | Mod: PBBFAC,,, | Performed by: INTERNAL MEDICINE

## 2022-09-12 PROCEDURE — 93005 ELECTROCARDIOGRAM TRACING: CPT | Mod: PO

## 2022-09-12 PROCEDURE — 93010 EKG 12-LEAD: ICD-10-PCS | Mod: ,,, | Performed by: INTERNAL MEDICINE

## 2022-09-12 PROCEDURE — 99213 OFFICE O/P EST LOW 20 MIN: CPT | Mod: PBBFAC,PO | Performed by: INTERNAL MEDICINE

## 2022-09-12 PROCEDURE — 99999 PR PBB SHADOW E&M-EST. PATIENT-LVL III: ICD-10-PCS | Mod: PBBFAC,,, | Performed by: INTERNAL MEDICINE

## 2022-09-12 PROCEDURE — 99204 OFFICE O/P NEW MOD 45 MIN: CPT | Mod: S$PBB,,, | Performed by: INTERNAL MEDICINE

## 2022-09-12 RX ORDER — HYDROCHLOROTHIAZIDE 12.5 MG/1
25 TABLET ORAL DAILY
COMMUNITY
End: 2024-02-01

## 2022-09-12 RX ORDER — SPIRONOLACTONE 25 MG/1
25 TABLET ORAL DAILY
COMMUNITY
End: 2022-09-27

## 2022-09-12 NOTE — PROGRESS NOTES
Subjective:    Patient ID:  Eligio Richardson is a 77 y.o. male who presents for Establish Care (Low blood pressure), Irregular Heart Beat, Hypertension, Loss of Consciousness, and Shortness of Breath        HPI  NEW PATIENT EVALUATION WAS FOLLOWED BY DR. NAYAK AT [facility], IRREGULAR HEART BETAS, BP UP, LATELY BP LOW FEELS FAINT, HAS NOT BEEN HAVING ANY IRREGULAR HEARTBEAT SINCE HE HAS BEEN ON BETA-BLOCKER, LEFT FOOT FEELS COLD SINCE HIS BACK SURGERY PROCEDURE OR BEFORE SEE ROS    Past Medical History:   Diagnosis Date    Anticoagulant long-term use     Cancer     skin cancer to face    Cholelithiasis     Constipation, chronic     severe    Degenerative disc disease, cervical     Gallstones     GERD (gastroesophageal reflux disease)     Hiatal hernia     Hypothyroidism     Inguinal hernia without mention of obstruction or gangrene, unilateral or unspecified, (not specified as recurrent)     Insomnia     Irregular heart beat     currently not an issue and not on any meds for it    Osteoarthritis     Sleep apnea      Past Surgical History:   Procedure Laterality Date    CHOLECYSTECTOMY      COLONOSCOPY  2013    Dr. Gee    COLONOSCOPY N/A 06/06/2016    Procedure: COLONOSCOPY;  Surgeon: Ricky Arriola MD;  Location: [facility] ENDO;  Service: Endoscopy;  Laterality: N/A;    COLONOSCOPY  07/15/2021    Dr. Hawk    CYSTOSCOPY N/A 06/18/2018    Procedure: CYSTOSCOPY;  Surgeon: Andry Paz MD;  Location: [facility] OR;  Service: Urology;  Laterality: N/A;    CYSTOURETHROSCOPY  10/2019    EGD, WITH BALLOON DILATION N/A 09/21/2021    ELBOW ARTHROPLASTY      EYE SURGERY Bilateral     cataract    FRACTURE SURGERY Right     knee     HIATAL HERNIA REPAIR  2013    [facility]    KNEE ARTHROSCOPY Right     neck injection  10/2019    NECK SURGERY      prostate biospy      normal per pt    SHOULDER SURGERY Bilateral     SINUS SURGERY  01/2019    Mirna Gill    TONSILLECTOMY      TRANSRECTAL BIOPSY OF PROSTATE WITH ULTRASOUND GUIDANCE N/A  "06/18/2018    Procedure: BIOPSY, PROSTATE, TRANSRECTAL APPROACH, WITH US GUIDANCE;  Surgeon: Andry Paz MD;  Location: [facility] OR;  Service: Urology;  Laterality: N/A;    UPPER GASTROINTESTINAL ENDOSCOPY  ~2013     Family History   Problem Relation Age of Onset    Lung cancer Mother     Skin cancer Father     Lung cancer Father     Prostate cancer Maternal Uncle     Prostate cancer Paternal Uncle     Colon cancer Neg Hx     Colon polyps Neg Hx     Esophageal cancer Neg Hx     Stomach cancer Neg Hx     Liver cancer Neg Hx     Crohn's disease Neg Hx     Inflammatory bowel disease Neg Hx     Irritable bowel syndrome Neg Hx      Social History     Socioeconomic History    Marital status:    Occupational History    Occupation: retired, worked at paper [employer]     Comment: [place]    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, Disp: , Rfl:     fluticasone propionate (FLONASE) 50 mcg/actuation nasal spray, 2 sprays (100 mcg total) by Each Nostril route 2 (two) times a day., Disp: 32 g, Rfl: 11    hydroCHLOROthiazide (HYDRODIURIL) 12.5 MG Tab, Take 25 mg by mouth once daily., Disp: , Rfl:     L gasseri/B bifidum/B longum (PROBIOTIC COLON CARE ORAL), Take 1 tablet by mouth once daily., Disp: , Rfl:     levothyroxine (SYNTHROID) 88 MCG tablet, Take 1 tablet (88 mcg total) by mouth once daily., Disp: 90 tablet, Rfl: 2    oxybutynin " (DITROPAN-XL) 10 MG 24 hr tablet, TAKE ONE TABLET BY MOUTH EVERY DAY, Disp: 30 tablet, Rfl: 11    pantoprazole (PROTONIX) 40 MG tablet, Take 1 tablet twice a day by oral route in the morning for 90 days., Disp: , Rfl:     PROPYLENE GLYCOL (SYSTANE BALANCE OPHT), Place 1 drop into both eyes once daily. , Disp: , Rfl:     spironolactone (ALDACTONE) 25 MG tablet, Take 25 mg by mouth once daily., Disp: , Rfl:     tamsulosin (FLOMAX) 0.4 mg Cap, Take 1 capsule (0.4 mg) by mouth twice daily., Disp: 60 capsule, Rfl: 11    traMADoL (ULTRAM) 50 mg tablet, Take 1 tablet (50 mg total) by mouth every 6 (six) hours as needed for Pain., Disp: 60 tablet, Rfl: 2    aluminum hydrox-magnesium carb 254-237.5 mg/5 mL Susp, Take 10 mLs by mouth daily as needed (stomach upset). , Disp: , Rfl:     aspirin 650 mg Pack, Take 650 mg by mouth daily as needed (pain). , Disp: , Rfl:     azelastine (ASTELIN) 137 mcg (0.1 %) nasal spray, 2 sprays (274 mcg total) by Nasal route 2 (two) times daily as needed for Rhinitis., Disp: 30 mL, Rfl: 6    cetirizine (ZYRTEC) 10 MG tablet, Take 10 mg by mouth once daily. , Disp: , Rfl:     fluorouraciL (EFUDEX) 5 % cream, Apply topically 2 (two) times daily. For 2 weeks. (Patient not taking: Reported on 9/12/2022), Disp: 40 g, Rfl: 1  No current facility-administered medications for this visit.    Facility-Administered Medications Ordered in Other Visits:     barium 2.1 % (w/v), 2.0 % (w/w) suspension 900 mL, 900 mL, Oral, Once, Ricky Arriola MD    Review of Systems   Constitutional: Positive for malaise/fatigue and weight loss. Negative for chills, diaphoresis, fever and night sweats.   HENT:  Negative for congestion and nosebleeds.    Eyes:  Negative for blurred vision and visual disturbance.   Cardiovascular:  Positive for dyspnea on exertion. Negative for chest pain, claudication, cyanosis, irregular heartbeat, leg swelling, near-syncope, orthopnea, palpitations, paroxysmal nocturnal dyspnea and  "syncope.        COLD L FOOT   Respiratory:  Positive for shortness of breath. Negative for cough, hemoptysis, sputum production and wheezing.    Endocrine: Negative for cold intolerance, heat intolerance and polyuria.   Hematologic/Lymphatic: Negative for adenopathy. Bruises/bleeds easily.        LEUKEMIA, PROSTATE CANCER   Skin:  Negative for color change, itching and nail changes.   Musculoskeletal:  Positive for arthritis, back pain (TO LLE, HAD INJECTION), joint pain and neck pain. Negative for falls.   Gastrointestinal:  Positive for constipation. Negative for abdominal pain, change in bowel habit, dysphagia, hematemesis, jaundice, melena and vomiting.   Genitourinary:  Positive for bladder incontinence. Negative for dysuria and flank pain.   Neurological:  Negative for brief paralysis, dizziness, focal weakness, light-headedness, loss of balance, numbness, paresthesias, seizures and weakness.   Psychiatric/Behavioral:  Negative for altered mental status, depression and memory loss. The patient is not nervous/anxious.    Allergic/Immunologic: Negative for persistent infections.      Objective:      Vitals:    09/12/22 1430   BP: 107/64   Pulse: 80   Weight: 88.4 kg (194 lb 14.2 oz)   Height: 5' 10" (1.778 m)   PainSc:   4   PainLoc: Head     Body mass index is 27.96 kg/m².    Physical Exam  Constitutional:       Appearance: He is well-developed.   HENT:      Head: Normocephalic and atraumatic.   Eyes:      Conjunctiva/sclera: Conjunctivae normal.      Pupils: Pupils are equal, round, and reactive to light.   Neck:      Thyroid: No thyromegaly.      Vascular: Normal carotid pulses. No carotid bruit, hepatojugular reflux or JVD.      Trachea: No tracheal deviation.   Cardiovascular:      Rate and Rhythm: Normal rate and regular rhythm. No extrasystoles are present.     Chest Wall: PMI is not displaced.      Pulses:           Carotid pulses are 1+ on the right side and 1+ on the left side.       Radial pulses are " 2+ on the right side and 2+ on the left side.        Femoral pulses are 2+ on the right side and 2+ on the left side.       Dorsalis pedis pulses are 2+ on the right side and 2+ on the left side.        Posterior tibial pulses are 2+ on the right side and 2+ on the left side.      Heart sounds: Heart sounds not distant. No midsystolic click. Murmur heard.   Systolic murmur is present with a grade of 3/6 at the upper right sternal border.     No friction rub. No gallop.   Pulmonary:      Effort: Pulmonary effort is normal. No tachypnea, bradypnea, accessory muscle usage or respiratory distress.      Breath sounds: Normal breath sounds.   Abdominal:      General: Bowel sounds are normal. There is no distension.      Palpations: Abdomen is soft. There is no mass.      Tenderness: There is no abdominal tenderness.   Musculoskeletal:         General: No deformity. Normal range of motion.      Cervical back: Neck supple. No edema or erythema.   Skin:     General: Skin is warm and dry.      Coloration: Skin is pale.      Findings: No erythema.   Neurological:      Mental Status: He is alert and oriented to person, place, and time.      Cranial Nerves: No cranial nerve deficit.      Motor: No tremor or abnormal muscle tone.      Coordination: Coordination normal.   Psychiatric:         Mood and Affect: Mood normal.         Speech: Speech normal.         Behavior: Behavior normal.         Thought Content: Thought content normal.             ..    Chemistry        Component Value Date/Time     12/07/2020 0905    K 4.3 12/07/2020 0905     12/07/2020 0905    CO2 29 12/07/2020 0905    BUN 19 12/07/2020 0905    CREATININE 1.06 12/07/2020 0905     (H) 12/07/2020 0905        Component Value Date/Time    CALCIUM 9.6 12/07/2020 0905    ALKPHOS 61 12/07/2020 0905    AST 28 12/07/2020 0905    ALT 26 12/07/2020 0905    BILITOT 1.0 12/07/2020 0905    ESTGFRAFRICA >60 12/07/2020 0905    EGFRNONAA >60 12/07/2020 0905             ..  Lab Results   Component Value Date    CHOL 154 12/07/2020    CHOL 149 10/19/2018    CHOL 165 09/06/2017     Lab Results   Component Value Date    HDL 51 12/07/2020    HDL 52 10/19/2018    HDL 47 09/06/2017     Lab Results   Component Value Date    LDLCALC 88.8 12/07/2020    LDLCALC 83.8 10/19/2018    LDLCALC 98.8 09/06/2017     Lab Results   Component Value Date    TRIG 71 12/07/2020    TRIG 66 10/19/2018    TRIG 96 09/06/2017     Lab Results   Component Value Date    CHOLHDL 33.1 12/07/2020    CHOLHDL 34.9 10/19/2018    CHOLHDL 28.5 09/06/2017     ..  Lab Results   Component Value Date    WBC 10.40 12/07/2020    HGB 15.1 12/07/2020    HCT 46.1 12/07/2020    MCV 97 12/07/2020     12/07/2020       Test(s) Reviewed  I have reviewed the following in detail:  [] Stress test   [] Angiography   [] Echocardiogram   [] Labs   [x] Other:       Assessment:         ICD-10-CM ICD-9-CM   1. Near syncope  R55 780.2   2. SOB (shortness of breath)  R06.02 786.05   3. Other fatigue  R53.83 780.79   4. Premature beats  I49.49 427.60   5. Nonspecific abnormal electrocardiogram (ECG) (EKG)  R94.31 794.31   6. Primary hypertension  I10 401.9   7. Stage 3a chronic kidney disease  N18.31 585.3   8. Systolic murmur  R01.1 785.2   9. Overweight (BMI 25.0-29.9)  E66.3 278.02   10. Cold left foot  R20.9 782.9     Problem List Items Addressed This Visit          Pulmonary    SOB (shortness of breath)    Relevant Orders    Nuclear Stress - Cardiology Interpreted    Echo       Cardiac/Vascular    Premature beats    Relevant Orders    TSH    CBC Auto Differential    Nonspecific abnormal electrocardiogram (ECG) (EKG)    Primary hypertension    Relevant Orders    Comprehensive Metabolic Panel    Systolic murmur    Relevant Orders    Echo    TSH       Renal/    Stage 3a chronic kidney disease    Relevant Orders    Comprehensive Metabolic Panel       Endocrine    Overweight (BMI 25.0-29.9)       Other    Near syncope - Primary     Relevant Orders    IN OFFICE EKG 12-LEAD (to Muse)    Nuclear Stress - Cardiology Interpreted    Echo    CV Ultrasound Bilateral Doppler Carotid    Comprehensive Metabolic Panel    TSH    CBC Auto Differential    Other fatigue    Relevant Orders    Comprehensive Metabolic Panel    TSH    Cold left foot    Relevant Orders    Ankle Brachial Indices (STARLA)        Plan:     EKG SR, LAD, NS ST CHANGES, INCOMPLETE RBBB, DC HCTZ/SPIRONOLACTONE, NO NEED FOR DOSE 2 DIURETICS, WILL NEED FURTHER EVALUATION CHECK LABS, NUCLEAR STRESS, TEST ASSESS FOR ISCHEMIA ECHO, AND CAROTID ULTRASOUND, ASSESS ANGINA EQUIVALENT NO OVERT HEART FAILURE NO SIGNIFICANT CLINICAL ASPIRIN ARRHYTHMIA DIET EXERCISE STAY WELL HYDRATED, RETURN CLINIC FEW WEEKS AFTER TEST, DISCUSSED PLAN WITH THE PATIENT HER IS WIFE OF GIVES MOST OF THE HISTORY      Near syncope  Comments:  ADJUST MEDS AND WORKUP  Orders:  -     IN OFFICE EKG 12-LEAD (to Muse)  -     Nuclear Stress - Cardiology Interpreted; Future  -     Echo  -     CV Ultrasound Bilateral Doppler Carotid; Future  -     Comprehensive Metabolic Panel; Future; Expected date: 09/12/2022  -     TSH; Future; Expected date: 09/12/2022  -     CBC Auto Differential; Future; Expected date: 09/12/2022    SOB (shortness of breath)  Comments:  WORKUP  Orders:  -     Nuclear Stress - Cardiology Interpreted; Future  -     Echo    Other fatigue  -     Comprehensive Metabolic Panel; Future; Expected date: 09/12/2022  -     TSH; Future; Expected date: 09/12/2022    Premature beats  Comments:  STABLE  Orders:  -     TSH; Future; Expected date: 09/12/2022  -     CBC Auto Differential; Future; Expected date: 09/12/2022    Nonspecific abnormal electrocardiogram (ECG) (EKG)    Primary hypertension  Comments:  CONTROLLED OR LOW  Orders:  -     Comprehensive Metabolic Panel; Future; Expected date: 09/12/2022    Stage 3a chronic kidney disease  -     Comprehensive Metabolic Panel; Future; Expected date: 09/12/2022    Systolic  murmur  -     Echo  -     TSH; Future; Expected date: 09/12/2022    Overweight (BMI 25.0-29.9)    Cold left foot  -     Ankle Brachial Indices (STARLA); Future  RTC Low level/low impact aerobic exercise 5x's/wk. Heart healthy diet and risk factor modification.    See labs and med orders.    Aerobic exercise 5x's/wk. Heart healthy diet and risk factor modification.    See labs and med orders.

## 2022-09-27 ENCOUNTER — OFFICE VISIT (OUTPATIENT)
Dept: FAMILY MEDICINE | Facility: CLINIC | Age: 77
End: 2022-09-27
Payer: MEDICARE

## 2022-09-27 VITALS
HEIGHT: 70 IN | OXYGEN SATURATION: 98 % | WEIGHT: 199.5 LBS | BODY MASS INDEX: 28.56 KG/M2 | SYSTOLIC BLOOD PRESSURE: 116 MMHG | HEART RATE: 56 BPM | DIASTOLIC BLOOD PRESSURE: 70 MMHG

## 2022-09-27 DIAGNOSIS — I10 PRIMARY HYPERTENSION: ICD-10-CM

## 2022-09-27 DIAGNOSIS — N40.0 BENIGN NON-NODULAR PROSTATIC HYPERPLASIA WITHOUT LOWER URINARY TRACT SYMPTOMS: ICD-10-CM

## 2022-09-27 DIAGNOSIS — E03.9 ACQUIRED HYPOTHYROIDISM: Primary | ICD-10-CM

## 2022-09-27 DIAGNOSIS — J30.9 ALLERGIC SINUSITIS: ICD-10-CM

## 2022-09-27 DIAGNOSIS — M50.30 DEGENERATIVE DISC DISEASE, CERVICAL: ICD-10-CM

## 2022-09-27 DIAGNOSIS — K59.01 CONSTIPATION BY DELAYED COLONIC TRANSIT: ICD-10-CM

## 2022-09-27 DIAGNOSIS — N18.31 STAGE 3A CHRONIC KIDNEY DISEASE: ICD-10-CM

## 2022-09-27 PROBLEM — A41.9 SEVERE SEPSIS: Status: RESOLVED | Noted: 2020-04-20 | Resolved: 2022-09-27

## 2022-09-27 PROBLEM — R20.9 COLD LEFT FOOT: Status: RESOLVED | Noted: 2022-09-12 | Resolved: 2022-09-27

## 2022-09-27 PROBLEM — R06.02 SOB (SHORTNESS OF BREATH): Status: RESOLVED | Noted: 2022-09-12 | Resolved: 2022-09-27

## 2022-09-27 PROBLEM — E66.3 OVERWEIGHT (BMI 25.0-29.9): Status: RESOLVED | Noted: 2018-09-25 | Resolved: 2022-09-27

## 2022-09-27 PROBLEM — R55 NEAR SYNCOPE: Status: RESOLVED | Noted: 2022-09-12 | Resolved: 2022-09-27

## 2022-09-27 PROBLEM — J40 BRONCHITIS: Status: RESOLVED | Noted: 2018-12-22 | Resolved: 2022-09-27

## 2022-09-27 PROBLEM — R65.20 SEVERE SEPSIS: Status: RESOLVED | Noted: 2020-04-20 | Resolved: 2022-09-27

## 2022-09-27 PROBLEM — I49.49 PREMATURE BEATS: Status: RESOLVED | Noted: 2022-09-12 | Resolved: 2022-09-27

## 2022-09-27 PROBLEM — R33.9 URINARY RETENTION: Status: RESOLVED | Noted: 2020-04-21 | Resolved: 2022-09-27

## 2022-09-27 PROBLEM — N39.0 UTI (URINARY TRACT INFECTION): Status: RESOLVED | Noted: 2020-04-20 | Resolved: 2022-09-27

## 2022-09-27 PROCEDURE — 99999 PR PBB SHADOW E&M-EST. PATIENT-LVL IV: ICD-10-PCS | Mod: PBBFAC,,, | Performed by: INTERNAL MEDICINE

## 2022-09-27 PROCEDURE — 99214 OFFICE O/P EST MOD 30 MIN: CPT | Mod: PBBFAC,PO | Performed by: INTERNAL MEDICINE

## 2022-09-27 PROCEDURE — 99214 OFFICE O/P EST MOD 30 MIN: CPT | Mod: S$PBB,,, | Performed by: INTERNAL MEDICINE

## 2022-09-27 PROCEDURE — 99214 PR OFFICE/OUTPT VISIT, EST, LEVL IV, 30-39 MIN: ICD-10-PCS | Mod: S$PBB,,, | Performed by: INTERNAL MEDICINE

## 2022-09-27 PROCEDURE — 99999 PR PBB SHADOW E&M-EST. PATIENT-LVL IV: CPT | Mod: PBBFAC,,, | Performed by: INTERNAL MEDICINE

## 2022-09-27 RX ORDER — MONTELUKAST SODIUM 10 MG/1
10 TABLET ORAL NIGHTLY
Qty: 90 TABLET | Refills: 1 | Status: SHIPPED | OUTPATIENT
Start: 2022-09-27 | End: 2022-10-27

## 2022-09-27 RX ORDER — TAMSULOSIN HYDROCHLORIDE 0.4 MG/1
0.4 CAPSULE ORAL 2 TIMES DAILY
Qty: 180 CAPSULE | Refills: 3 | Status: SHIPPED | OUTPATIENT
Start: 2022-09-27 | End: 2023-10-11 | Stop reason: SDUPTHER

## 2022-09-27 RX ORDER — MELOXICAM 15 MG/1
15 TABLET ORAL DAILY
Qty: 90 TABLET | Refills: 3 | Status: SHIPPED | OUTPATIENT
Start: 2022-09-27 | End: 2023-11-09 | Stop reason: SDUPTHER

## 2022-09-27 RX ORDER — LEVOTHYROXINE SODIUM 88 UG/1
88 TABLET ORAL DAILY
Qty: 90 TABLET | Refills: 3 | Status: SHIPPED | OUTPATIENT
Start: 2022-09-27 | End: 2023-09-26 | Stop reason: SDUPTHER

## 2022-09-27 NOTE — PROGRESS NOTES
Subjective     Eligio Richardson is a 77 y.o. old, male here for [facility] Care    76 y/o with PMH of CLL, prostate ca followed by MD linares, BPH, CKD stage 3, CLBP.  Here today with his wife.    CLL and prostate ca are in remission.  BPH stable with urgency, oxybutynin is not sure it is helping.  CKD: stable over several years. He has been on NSAID's chronically but looking back GFR has not changed significantly. He was recently told he should stop meloxicam and take tramadol instead which does nothing for his CLBP and chronic cervical pain.  He has been told surgery is not an option. He takes hydrocodone sparingly. It seems to be a quality of life issues as well.  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    Review of Systems   Constitutional:  Negative for malaise/fatigue and weight loss.   Respiratory:  Negative for cough and shortness of breath.    Cardiovascular:  Negative for chest pain and palpitations.   Musculoskeletal:  Positive for back pain, joint pain and neck pain.   Endo/Heme/Allergies:  Positive for environmental allergies.   Medications     Outpatient Medications Marked as Taking for the 9/27/22 encounter (Office Visit) with Acosta Corley MD   Medication Sig Dispense Refill    aluminum hydrox-magnesium carb 254-237.5 mg/5 mL Susp Take 10 mLs by mouth daily as needed (stomach upset).       aspirin 650 mg Pack Take 650 mg by mouth daily as needed (pain).       cetirizine (ZYRTEC) 10 MG tablet Take 10 mg by mouth once daily.       finasteride (PROSCAR) 5 mg tablet Take 5 mg by mouth.      fluorouraciL (EFUDEX) 5 % cream Apply topically 2 (two) times daily. For 2 weeks. 40 g 1    fluticasone propionate (FLONASE) 50 mcg/actuation nasal spray 2 sprays (100 mcg total) by Each Nostril route 2 (two) times a day. 32 g 11    hydroCHLOROthiazide (HYDRODIURIL) 12.5 MG Tab Take 25 mg by mouth once daily.      L gasseri/B bifidum/B longum (PROBIOTIC COLON CARE ORAL) Take 1 tablet by mouth once daily.       "pantoprazole (PROTONIX) 40 MG tablet Take 1 tablet twice a day by oral route in the morning for 90 days.      PROPYLENE GLYCOL (SYSTANE BALANCE OPHT) Place 1 drop into both eyes once daily.       [DISCONTINUED] acebutoloL (SECTRAL) 400 mg capsule TAKE ONE CAPSULE BY MOUTH TWICE DAILY 180 capsule 5    [DISCONTINUED] levothyroxine (SYNTHROID) 88 MCG tablet Take 1 tablet (88 mcg total) by mouth once daily. 90 tablet 2    [DISCONTINUED] oxybutynin (DITROPAN-XL) 10 MG 24 hr tablet TAKE ONE TABLET BY MOUTH EVERY DAY 30 tablet 11    [DISCONTINUED] spironolactone (ALDACTONE) 25 MG tablet Take 25 mg by mouth once daily.      [DISCONTINUED] tamsulosin (FLOMAX) 0.4 mg Cap Take 1 capsule (0.4 mg) by mouth twice daily. 60 capsule 11    [DISCONTINUED] traMADoL (ULTRAM) 50 mg tablet Take 1 tablet (50 mg total) by mouth every 6 (six) hours as needed for Pain. 60 tablet 2     Objective     /70 (BP Location: Left arm)   Pulse (!) 56   Ht 5' 10" (1.778 m)   Wt 90.5 kg (199 lb 8.3 oz)   SpO2 98%   BMI 28.63 kg/m²   Physical Exam  Constitutional:       General: He is not in acute distress.     Appearance: Normal appearance. He is well-developed.   Cardiovascular:      Rate and Rhythm: Normal rate and regular rhythm.      Heart sounds: Murmur heard.   Pulmonary:      Effort: Pulmonary effort is normal. No respiratory distress.      Breath sounds: Normal breath sounds.     Assessment and Plan     Acquired hypothyroidism  -     levothyroxine (SYNTHROID) 88 MCG tablet; Take 1 tablet (88 mcg total) by mouth once daily.  Dispense: 90 tablet; Refill: 3    Benign non-nodular prostatic hyperplasia without lower urinary tract symptoms  -     tamsulosin (FLOMAX) 0.4 mg Cap; Take 1 capsule (0.4 mg total) by mouth 2 (two) times a day.  Dispense: 180 capsule; Refill: 3    Primary hypertension    Stage 3a chronic kidney disease    Degenerative disc disease, cervical  -     meloxicam (MOBIC) 15 MG tablet; Take 1 tablet (15 mg total) by mouth " once daily.  Dispense: 90 tablet; Refill: 3    Constipation by delayed colonic transit    Allergic sinusitis  -     montelukast (SINGULAIR) 10 mg tablet; Take 1 tablet (10 mg total) by mouth every evening.  Dispense: 90 tablet; Refill: 1      Follow up in about 6 months (around 3/27/2023).  ___________________  Acosta Corley MD  Internal Medicine and Pediatrics

## 2022-10-03 ENCOUNTER — PATIENT MESSAGE (OUTPATIENT)
Dept: CARDIOLOGY | Facility: HOSPITAL | Age: 77
End: 2022-10-03
Payer: MEDICARE

## 2022-10-05 ENCOUNTER — CLINICAL SUPPORT (OUTPATIENT)
Dept: CARDIOLOGY | Facility: HOSPITAL | Age: 77
End: 2022-10-05
Attending: INTERNAL MEDICINE
Payer: MEDICARE

## 2022-10-05 ENCOUNTER — HOSPITAL ENCOUNTER (OUTPATIENT)
Dept: RADIOLOGY | Facility: HOSPITAL | Age: 77
Discharge: HOME | End: 2022-10-05
Attending: INTERNAL MEDICINE
Payer: MEDICARE

## 2022-10-05 VITALS — BODY MASS INDEX: 28.49 KG/M2 | WEIGHT: 199 LBS | HEIGHT: 70 IN

## 2022-10-05 VITALS — WEIGHT: 199 LBS | HEIGHT: 70 IN | BODY MASS INDEX: 28.49 KG/M2

## 2022-10-05 DIAGNOSIS — R55 NEAR SYNCOPE: ICD-10-CM

## 2022-10-05 DIAGNOSIS — R20.9 COLD LEFT FOOT: ICD-10-CM

## 2022-10-05 DIAGNOSIS — R06.02 SOB (SHORTNESS OF BREATH): ICD-10-CM

## 2022-10-05 LAB
ASCENDING AORTA: 4.24 CM
AV INDEX (PROSTH): 0.36
AV MEAN GRADIENT: 21 MMHG
AV PEAK GRADIENT: 33 MMHG
AV REGURGITATION PRESSURE HALF TIME: 563.34 MS
AV VALVE AREA: 1.45 CM2
AV VELOCITY RATIO: 0.37
BSA FOR ECHO PROCEDURE: 2.11 M2
CV ECHO LV RWT: 0.33 CM
CV PHARM DOSE: 0.4 MG
CV STRESS BASE HR: 48 BPM
DIASTOLIC BLOOD PRESSURE: 86 MMHG
DOP CALC AO PEAK VEL: 2.89 M/S
DOP CALC AO VTI: 72 CM
DOP CALC LVOT AREA: 4.1 CM2
DOP CALC LVOT DIAMETER: 2.28 CM
DOP CALC LVOT PEAK VEL: 1.06 M/S
DOP CALC LVOT STROKE VOLUME: 104.47 CM3
DOP CALCLVOT PEAK VEL VTI: 25.6 CM
E WAVE DECELERATION TIME: 198.55 MSEC
E/A RATIO: 1.84
E/E' RATIO: 9.71 M/S
ECHO LV POSTERIOR WALL: 0.93 CM (ref 0.6–1.1)
EJECTION FRACTION: 65 %
FRACTIONAL SHORTENING: 35 % (ref 28–44)
INTERVENTRICULAR SEPTUM: 0.96 CM (ref 0.6–1.1)
LA MAJOR: 5.7 CM
LA MINOR: 5.55 CM
LA WIDTH: 4.3 CM
LEFT ABI: 1.13
LEFT ARM BP: 156 MMHG
LEFT ARM DIASTOLIC BLOOD PRESSURE: 70 MMHG
LEFT ARM SYSTOLIC BLOOD PRESSURE: 116 MMHG
LEFT ATRIUM SIZE: 4.51 CM
LEFT ATRIUM VOLUME INDEX: 44.6 ML/M2
LEFT ATRIUM VOLUME: 92.71 CM3
LEFT CBA DIAS: 26 CM/S
LEFT CBA SYS: 88 CM/S
LEFT CCA DIST DIAS: 14 CM/S
LEFT CCA DIST SYS: 59 CM/S
LEFT CCA MID DIAS: 18 CM/S
LEFT CCA MID SYS: 70 CM/S
LEFT CCA PROX DIAS: 11 CM/S
LEFT CCA PROX SYS: 58 CM/S
LEFT DORSALIS PEDIS: 177 MMHG
LEFT ECA DIAS: 11 CM/S
LEFT ECA SYS: 63 CM/S
LEFT ICA DIST DIAS: 23 CM/S
LEFT ICA DIST SYS: 70 CM/S
LEFT ICA MID DIAS: 26 CM/S
LEFT ICA MID SYS: 62 CM/S
LEFT ICA PROX DIAS: 25 CM/S
LEFT ICA PROX SYS: 62 CM/S
LEFT INTERNAL DIMENSION IN SYSTOLE: 3.68 CM (ref 2.1–4)
LEFT POSTERIOR TIBIAL: 176 MMHG
LEFT TBI: 0.91
LEFT TOE PRESSURE: 142 MMHG
LEFT VENTRICLE DIASTOLIC VOLUME INDEX: 75.14 ML/M2
LEFT VENTRICLE DIASTOLIC VOLUME: 156.29 ML
LEFT VENTRICLE MASS INDEX: 99 G/M2
LEFT VENTRICLE SYSTOLIC VOLUME INDEX: 27.5 ML/M2
LEFT VENTRICLE SYSTOLIC VOLUME: 57.25 ML
LEFT VENTRICULAR INTERNAL DIMENSION IN DIASTOLE: 5.64 CM (ref 3.5–6)
LEFT VENTRICULAR MASS: 206.56 G
LEFT VERTEBRAL DIAS: 14 CM/S
LEFT VERTEBRAL SYS: 44 CM/S
LV LATERAL E/E' RATIO: 8.5 M/S
LV SEPTAL E/E' RATIO: 11.33 M/S
LVOT MG: 2.44 MMHG
LVOT MV: 0.73 CM/S
MV PEAK A VEL: 0.37 M/S
MV PEAK E VEL: 0.68 M/S
NUC REST EJECTION FRACTION: 64
OHS CV CAROTID RIGHT ICA EDV HIGHEST: 32
OHS CV CAROTID ULTRASOUND LEFT ICA/CCA RATIO: 1.19
OHS CV CAROTID ULTRASOUND RIGHT ICA/CCA RATIO: 1.46
OHS CV CPX 1 MINUTE RECOVERY HEART RATE: 73 BPM
OHS CV CPX 85 PERCENT MAX PREDICTED HEART RATE MALE: 121
OHS CV CPX MAX PREDICTED HEART RATE: 142
OHS CV CPX PATIENT IS FEMALE: 0
OHS CV CPX PATIENT IS MALE: 1
OHS CV CPX PEAK DIASTOLIC BLOOD PRESSURE: 92 MMHG
OHS CV CPX PEAK HEAR RATE: 74 BPM
OHS CV CPX PEAK RATE PRESSURE PRODUCT: NORMAL
OHS CV CPX PEAK SYSTOLIC BLOOD PRESSURE: 155 MMHG
OHS CV CPX PERCENT MAX PREDICTED HEART RATE ACHIEVED: 52
OHS CV CPX RATE PRESSURE PRODUCT PRESENTING: 7104
OHS CV PHARM TIME: 908 MIN
OHS CV PV CAROTID LEFT HIGHEST CCA: 70
OHS CV PV CAROTID LEFT HIGHEST ICA: 70
OHS CV PV CAROTID RIGHT HIGHEST CCA: 77
OHS CV PV CAROTID RIGHT HIGHEST ICA: 76
OHS CV US CAROTID LEFT HIGHEST EDV: 26
PISA AR MAX VEL: 4.17 M/S
PISA MRMAX VEL: 6.54 M/S
PISA TR MAX VEL: 2.4 M/S
RA MAJOR: 4.96 CM
RA PRESSURE: 3 MMHG
RA WIDTH: 4.29 CM
RIGHT ABI: 1.13
RIGHT ARM BP: 151 MMHG
RIGHT ARM DIASTOLIC BLOOD PRESSURE: 70 MMHG
RIGHT ARM SYSTOLIC BLOOD PRESSURE: 116 MMHG
RIGHT CBA DIAS: 12 CM/S
RIGHT CBA SYS: 31 CM/S
RIGHT CCA DIST DIAS: 16 CM/S
RIGHT CCA DIST SYS: 52 CM/S
RIGHT CCA MID DIAS: 16 CM/S
RIGHT CCA MID SYS: 64 CM/S
RIGHT CCA PROX DIAS: 13 CM/S
RIGHT CCA PROX SYS: 77 CM/S
RIGHT DORSALIS PEDIS: 171 MMHG
RIGHT ECA DIAS: 11 CM/S
RIGHT ECA SYS: 61 CM/S
RIGHT ICA DIST DIAS: 32 CM/S
RIGHT ICA DIST SYS: 76 CM/S
RIGHT ICA MID DIAS: 26 CM/S
RIGHT ICA MID SYS: 60 CM/S
RIGHT ICA PROX DIAS: 15 CM/S
RIGHT ICA PROX SYS: 51 CM/S
RIGHT POSTERIOR TIBIAL: 176 MMHG
RIGHT TBI: 0.83
RIGHT TOE PRESSURE: 130 MMHG
RIGHT VENTRICULAR END-DIASTOLIC DIMENSION: 4 CM
RIGHT VENTRICULAR LENGTH IN DIASTOLE (APICAL 4-CHAMBER VIEW): 8.17 CM
RIGHT VERTEBRAL DIAS: 7 CM/S
RIGHT VERTEBRAL SYS: 36 CM/S
RV MID DIAMA: 3.36 CM
RV TISSUE DOPPLER FREE WALL SYSTOLIC VELOCITY 1 (APICAL 4 CHAMBER VIEW): 0.01 CM/S
SINUS: 3.45 CM
STJ: 3.85 CM
SYSTOLIC BLOOD PRESSURE: 148 MMHG
TDI LATERAL: 0.08 M/S
TDI SEPTAL: 0.06 M/S
TDI: 0.07 M/S
TR MAX PG: 23 MMHG
TRICUSPID ANNULAR PLANE SYSTOLIC EXCURSION: 2.67 CM
TV REST PULMONARY ARTERY PRESSURE: 26 MMHG

## 2022-10-05 PROCEDURE — 93880 EXTRACRANIAL BILAT STUDY: CPT | Mod: PO

## 2022-10-05 PROCEDURE — 93017 CV STRESS TEST TRACING ONLY: CPT | Mod: PO

## 2022-10-05 PROCEDURE — 93922 ANKLE BRACHIAL INDICES (ABI): ICD-10-PCS | Mod: 26,,, | Performed by: INTERNAL MEDICINE

## 2022-10-05 PROCEDURE — 93880 EXTRACRANIAL BILAT STUDY: CPT | Mod: 26,,, | Performed by: INTERNAL MEDICINE

## 2022-10-05 PROCEDURE — 93018 CV STRESS TEST I&R ONLY: CPT | Mod: ,,, | Performed by: INTERNAL MEDICINE

## 2022-10-05 PROCEDURE — 93306 TTE W/DOPPLER COMPLETE: CPT | Mod: PO

## 2022-10-05 PROCEDURE — 93922 UPR/L XTREMITY ART 2 LEVELS: CPT | Mod: PO

## 2022-10-05 PROCEDURE — A9502 TC99M TETROFOSMIN: HCPCS | Mod: PO

## 2022-10-05 PROCEDURE — 93922 UPR/L XTREMITY ART 2 LEVELS: CPT | Mod: 26,,, | Performed by: INTERNAL MEDICINE

## 2022-10-05 PROCEDURE — 78452 STRESS TEST WITH MYOCARDIAL PERFUSION (CUPID ONLY): ICD-10-PCS | Mod: 26,,, | Performed by: INTERNAL MEDICINE

## 2022-10-05 PROCEDURE — 63600175 PHARM REV CODE 636 W HCPCS: Mod: PO | Performed by: INTERNAL MEDICINE

## 2022-10-05 PROCEDURE — 93016 CV STRESS TEST SUPVJ ONLY: CPT | Mod: ,,, | Performed by: INTERNAL MEDICINE

## 2022-10-05 PROCEDURE — 93880 CV US DOPPLER CAROTID (CUPID ONLY): ICD-10-PCS | Mod: 26,,, | Performed by: INTERNAL MEDICINE

## 2022-10-05 PROCEDURE — 93018 PR CARDIAC STRESS TST,INTERP/REPT ONLY: ICD-10-PCS | Mod: ,,, | Performed by: INTERNAL MEDICINE

## 2022-10-05 PROCEDURE — 78452 HT MUSCLE IMAGE SPECT MULT: CPT | Mod: 26,,, | Performed by: INTERNAL MEDICINE

## 2022-10-05 PROCEDURE — 93016 STRESS TEST WITH MYOCARDIAL PERFUSION (CUPID ONLY): ICD-10-PCS | Mod: ,,, | Performed by: INTERNAL MEDICINE

## 2022-10-05 RX ORDER — REGADENOSON 0.08 MG/ML
0.4 INJECTION, SOLUTION INTRAVENOUS ONCE
Status: COMPLETED | OUTPATIENT
Start: 2022-10-05 | End: 2022-10-05

## 2022-10-05 RX ADMIN — REGADENOSON 0.4 MG: 0.08 INJECTION, SOLUTION INTRAVENOUS at 09:10

## 2022-10-12 ENCOUNTER — OFFICE VISIT (OUTPATIENT)
Dept: CARDIOLOGY | Facility: CLINIC | Age: 77
End: 2022-10-12
Payer: MEDICARE

## 2022-10-12 VITALS
SYSTOLIC BLOOD PRESSURE: 136 MMHG | WEIGHT: 199.75 LBS | BODY MASS INDEX: 28.6 KG/M2 | HEIGHT: 70 IN | HEART RATE: 60 BPM | DIASTOLIC BLOOD PRESSURE: 68 MMHG

## 2022-10-12 DIAGNOSIS — R94.39 ABNORMAL NUCLEAR STRESS TEST: Primary | ICD-10-CM

## 2022-10-12 DIAGNOSIS — R06.02 SOB (SHORTNESS OF BREATH): Chronic | ICD-10-CM

## 2022-10-12 DIAGNOSIS — R53.83 OTHER FATIGUE: ICD-10-CM

## 2022-10-12 DIAGNOSIS — E66.3 OVERWEIGHT (BMI 25.0-29.9): Chronic | ICD-10-CM

## 2022-10-12 DIAGNOSIS — E78.00 HYPERCHOLESTEROLEMIA: Chronic | ICD-10-CM

## 2022-10-12 DIAGNOSIS — I65.23 CAROTID ARTERY PLAQUE, BILATERAL: Chronic | ICD-10-CM

## 2022-10-12 DIAGNOSIS — I08.0 MITRAL REGURGITATION AND AORTIC STENOSIS: Chronic | ICD-10-CM

## 2022-10-12 DIAGNOSIS — I77.810 ASCENDING AORTA DILATATION: Chronic | ICD-10-CM

## 2022-10-12 PROCEDURE — 99214 PR OFFICE/OUTPT VISIT, EST, LEVL IV, 30-39 MIN: ICD-10-PCS | Mod: S$GLB,,, | Performed by: INTERNAL MEDICINE

## 2022-10-12 PROCEDURE — 99214 OFFICE O/P EST MOD 30 MIN: CPT | Mod: S$GLB,,, | Performed by: INTERNAL MEDICINE

## 2022-10-12 RX ORDER — SODIUM CHLORIDE 9 MG/ML
INJECTION, SOLUTION INTRAVENOUS ONCE
Status: CANCELLED | OUTPATIENT
Start: 2022-10-12 | End: 2022-10-12

## 2022-10-12 RX ORDER — ACEBUTOLOL HYDROCHLORIDE 400 MG/1
400 CAPSULE ORAL 2 TIMES DAILY
COMMUNITY
Start: 2022-04-26 | End: 2023-03-08 | Stop reason: ALTCHOICE

## 2022-10-12 RX ORDER — NAPROXEN SODIUM 220 MG/1
81 TABLET, FILM COATED ORAL ONCE
Status: CANCELLED | OUTPATIENT
Start: 2022-10-12 | End: 2022-10-12

## 2022-10-12 RX ORDER — CLOPIDOGREL BISULFATE 75 MG/1
300 TABLET ORAL ONCE
Status: CANCELLED | OUTPATIENT
Start: 2022-10-12 | End: 2022-10-12

## 2022-10-12 RX ORDER — OLMESARTAN MEDOXOMIL 5 MG/1
5 TABLET ORAL NIGHTLY
Qty: 90 TABLET | Refills: 0 | Status: SHIPPED | OUTPATIENT
Start: 2022-10-12 | End: 2022-12-28 | Stop reason: SDUPTHER

## 2022-10-12 RX ORDER — HYDROCODONE BITARTRATE AND ACETAMINOPHEN 7.5; 325 MG/1; MG/1
1 TABLET ORAL EVERY 6 HOURS PRN
COMMUNITY
Start: 2022-09-06 | End: 2023-06-09

## 2022-10-12 NOTE — PROGRESS NOTES
Subjective:    Patient ID:  Eligio Richardson is a 77 y.o. male who presents for Follow-up        Follow-up  Associated symptoms include neck pain. Pertinent negatives include no abdominal pain, change in bowel habit, chest pain, chills, congestion, coughing, diaphoresis, fever, nausea or weakness.     DISCUSSED TESTS NORMAL STARLA, MOD AS, MR, DILATED AORTA, ABNORMAL STRESS WITH EVIDENCE OF ISCHEMIA, CMP OK,CBC OK, NO ENERGY, SOMEWHAT WORSENING SYMPTOMS, SEE ROS  Past Medical History:   Diagnosis Date    Anticoagulant long-term use     Cancer     skin cancer to face    Cholelithiasis     Constipation, chronic     severe    Degenerative disc disease, cervical     Gallstones     GERD (gastroesophageal reflux disease)     Hiatal hernia     Hypothyroidism     Inguinal hernia without mention of obstruction or gangrene, unilateral or unspecified, (not specified as recurrent)     Insomnia     Irregular heart beat     currently not an issue and not on any meds for it    Osteoarthritis     Sleep apnea      Past Surgical History:   Procedure Laterality Date    CHOLECYSTECTOMY      COLONOSCOPY  2013    Dr. Gee    COLONOSCOPY N/A 06/06/2016    Procedure: COLONOSCOPY;  Surgeon: Ricky Arriola MD;  Location: [facility] ENDO;  Service: Endoscopy;  Laterality: N/A;    COLONOSCOPY  07/15/2021    Dr. Hawk    CYSTOSCOPY N/A 06/18/2018    Procedure: CYSTOSCOPY;  Surgeon: Andry Paz MD;  Location: [facility] OR;  Service: Urology;  Laterality: N/A;    CYSTOURETHROSCOPY  10/2019    EGD, WITH BALLOON DILATION N/A 09/21/2021    ELBOW ARTHROPLASTY      EYE SURGERY Bilateral     cataract    FRACTURE SURGERY Right     knee     HIATAL HERNIA REPAIR  2013    OF    KNEE ARTHROSCOPY Right     neck injection  10/2019    NECK SURGERY      prostate biospy      normal per pt    SHOULDER SURGERY Bilateral     SINUS SURGERY  01/2019    Mirna Gill    TONSILLECTOMY      TRANSRECTAL BIOPSY OF PROSTATE WITH ULTRASOUND GUIDANCE N/A 06/18/2018     "Procedure: BIOPSY, PROSTATE, TRANSRECTAL APPROACH, WITH US GUIDANCE;  Surgeon: Andry Paz MD;  Location: [facility] OR;  Service: Urology;  Laterality: N/A;    UPPER GASTROINTESTINAL ENDOSCOPY  ~2013     Family History   Problem Relation Age of Onset    Lung cancer Mother     Skin cancer Father     Lung cancer Father     Prostate cancer Maternal Uncle     Prostate cancer Paternal Uncle     Colon cancer Neg Hx     Colon polyps Neg Hx     Esophageal cancer Neg Hx     Stomach cancer Neg Hx     Liver cancer Neg Hx     Crohn's disease Neg Hx     Inflammatory bowel disease Neg Hx     Irritable bowel syndrome Neg Hx      Social History     Socioeconomic History    Marital status:    Occupational History    Occupation: retired, worked at [employer]     Comment: [place]    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 , Disp: , Rfl:     finasteride (PROSCAR) 5 mg tablet, Take 5 mg by mouth., Disp: , Rfl:     fluorouraciL (EFUDEX) 5 % cream, Apply topically 2 (two) times daily. For 2 weeks., Disp: 40 g, Rfl: 1    fluticasone propionate (FLONASE) 50 mcg/actuation nasal spray, 2 sprays (100 mcg total) by Each Nostril route 2 (two) times a day., Disp: 32 g, Rfl: 11    L gasseri/B bifidum/B longum (PROBIOTIC COLON CARE ORAL), Take 1 tablet by mouth once daily., Disp: , Rfl:     levothyroxine (SYNTHROID) 88 MCG tablet, Take 1 tablet (88 mcg total) by mouth " once daily., Disp: 90 tablet, Rfl: 3    meloxicam (MOBIC) 15 MG tablet, Take 1 tablet (15 mg total) by mouth once daily., Disp: 90 tablet, Rfl: 3    montelukast (SINGULAIR) 10 mg tablet, Take 1 tablet (10 mg total) by mouth every evening., Disp: 90 tablet, Rfl: 1    pantoprazole (PROTONIX) 40 MG tablet, Take 1 tablet twice a day by oral route in the morning for 90 days., Disp: , Rfl:     PROPYLENE GLYCOL (SYSTANE BALANCE OPHT), Place 1 drop into both eyes once daily. , Disp: , Rfl:     tamsulosin (FLOMAX) 0.4 mg Cap, Take 1 capsule (0.4 mg total) by mouth 2 (two) times a day., Disp: 180 capsule, Rfl: 3    aluminum hydrox-magnesium carb 254-237.5 mg/5 mL Susp, Take 10 mLs by mouth daily as needed (stomach upset). , Disp: , Rfl:     hydroCHLOROthiazide (HYDRODIURIL) 12.5 MG Tab, Take 25 mg by mouth once daily., Disp: , Rfl:     HYDROcodone-acetaminophen (NORCO) 7.5-325 mg per tablet, Take by mouth., Disp: , Rfl:     olmesartan (BENICAR) 5 MG Tab, Take 1 tablet (5 mg total) by mouth every evening., Disp: 90 tablet, Rfl: 0  No current facility-administered medications for this visit.    Facility-Administered Medications Ordered in Other Visits:     barium 2.1 % (w/v), 2.0 % (w/w) suspension 900 mL, 900 mL, Oral, Once, Ricky Arriola MD    Review of Systems   Constitutional: Positive for malaise/fatigue. Negative for chills, diaphoresis, fever, night sweats and weight loss.   HENT:  Negative for congestion and nosebleeds.    Eyes:  Positive for blurred vision. Negative for redness.   Cardiovascular:  Positive for dyspnea on exertion. Negative for chest pain, claudication, cyanosis, irregular heartbeat, leg swelling, near-syncope, orthopnea, palpitations, paroxysmal nocturnal dyspnea and syncope.        COLD L FOOT   Respiratory:  Positive for shortness of breath. Negative for cough, hemoptysis, sputum production and wheezing.    Endocrine: Negative for cold intolerance and heat intolerance.   Hematologic/Lymphatic:  "Negative for adenopathy. Bruises/bleeds easily.        LEUKEMIA, PROSTATE CANCER   Skin:  Negative for color change, itching and nail changes.   Musculoskeletal:  Positive for arthritis, back pain (TO LLE, HAD INJECTION) and neck pain. Negative for falls.   Gastrointestinal:  Negative for abdominal pain, change in bowel habit, constipation, dysphagia, jaundice, melena and nausea.   Genitourinary:  Negative for dysuria and flank pain.   Neurological:  Negative for brief paralysis, dizziness, focal weakness, light-headedness, loss of balance, paresthesias and weakness.   Psychiatric/Behavioral:  Negative for altered mental status and depression.    Allergic/Immunologic: Negative for hives and persistent infections.      Objective:      Vitals:    10/12/22 0942   BP: 136/68   Pulse: 60   Weight: 90.6 kg (199 lb 11.8 oz)   Height: 5' 10" (1.778 m)   PainSc:   3   PainLoc: Head     Body mass index is 28.66 kg/m².    Physical Exam  Constitutional:       Appearance: Normal appearance.   HENT:      Head: Normocephalic and atraumatic.   Eyes:      Extraocular Movements: Extraocular movements intact.      Conjunctiva/sclera: Conjunctivae normal.   Cardiovascular:      Rate and Rhythm: Normal rate and regular rhythm.      Pulses: Normal pulses.           Carotid pulses are 2+ on the right side and 2+ on the left side.       Radial pulses are 2+ on the right side and 2+ on the left side.        Posterior tibial pulses are 2+ on the right side and 2+ on the left side.      Heart sounds: Murmur heard.   Harsh midsystolic murmur is present with a grade of 2/6 at the upper right sternal border radiating to the neck.     No friction rub. No gallop.   Pulmonary:      Effort: Pulmonary effort is normal.      Breath sounds: Normal breath sounds. No rales.   Abdominal:      Palpations: Abdomen is soft.      Tenderness: There is no abdominal tenderness.   Musculoskeletal:      Cervical back: Neck supple.      Right lower leg: No edema. "      Left lower leg: No edema.   Skin:     Capillary Refill: Capillary refill takes less than 2 seconds.   Neurological:      General: No focal deficit present.      Mental Status: He is alert and oriented to person, place, and time.      Cranial Nerves: Cranial nerves 2-12 are intact.   Psychiatric:         Mood and Affect: Mood normal.         Speech: Speech normal.         Behavior: Behavior normal.               ..    Chemistry        Component Value Date/Time     10/05/2022 0741    K 3.9 10/05/2022 0741     10/05/2022 0741    CO2 23 10/05/2022 0741    BUN 23 10/05/2022 0741    CREATININE 1.2 10/05/2022 0741     10/05/2022 0741        Component Value Date/Time    CALCIUM 8.9 10/05/2022 0741    ALKPHOS 45 (L) 10/05/2022 0741    AST 21 10/05/2022 0741    ALT 22 10/05/2022 0741    BILITOT 1.3 (H) 10/05/2022 0741    ESTGFRAFRICA >60 12/07/2020 0905    EGFRNONAA >60 12/07/2020 0905            ..  Lab Results   Component Value Date    CHOL 154 12/07/2020    CHOL 149 10/19/2018    CHOL 165 09/06/2017     Lab Results   Component Value Date    HDL 51 12/07/2020    HDL 52 10/19/2018    HDL 47 09/06/2017     Lab Results   Component Value Date    LDLCALC 88.8 12/07/2020    LDLCALC 83.8 10/19/2018    LDLCALC 98.8 09/06/2017     Lab Results   Component Value Date    TRIG 71 12/07/2020    TRIG 66 10/19/2018    TRIG 96 09/06/2017     Lab Results   Component Value Date    CHOLHDL 33.1 12/07/2020    CHOLHDL 34.9 10/19/2018    CHOLHDL 28.5 09/06/2017     ..  Lab Results   Component Value Date    WBC 8.67 10/05/2022    HGB 14.1 10/05/2022    HCT 43.0 10/05/2022     (H) 10/05/2022     10/05/2022       Test(s) Reviewed  I have reviewed the following in detail:  [x] Stress test   [] Angiography   [x] Echocardiogram   [x] Labs   [x] Other:       Assessment:         ICD-10-CM ICD-9-CM   1. Abnormal nuclear stress test  R94.39 794.39   2. Mitral regurgitation and aortic stenosis  I08.0 396.2   3.  Ascending aorta dilatation  I77.810 447.71   4. Carotid artery plaque, bilateral  I65.23 433.10     433.30   5. Hypercholesterolemia  E78.00 272.0   6. Overweight (BMI 25.0-29.9)  E66.3 278.02   7. SOB (shortness of breath)  R06.02 786.05   8. Other fatigue  R53.83 780.79     Problem List Items Addressed This Visit          Pulmonary    SOB (shortness of breath)    Relevant Orders    Case Request-Cath Lab: Angiogram, Coronary, with Left Heart Cath (Completed)    Vital signs    Cardiac Monitoring - Adult    Basic metabolic panel    Full code       Cardiac/Vascular    Abnormal nuclear stress test - Primary    Relevant Orders    Case Request-Cath Lab: Angiogram, Coronary, with Left Heart Cath (Completed)    Vital signs    Cardiac Monitoring - Adult    Basic metabolic panel    Full code    Mitral regurgitation and aortic stenosis    Relevant Orders    Case Request-Cath Lab: Angiogram, Coronary, with Left Heart Cath (Completed)    Vital signs    Cardiac Monitoring - Adult    Basic metabolic panel    Full code    Ascending aorta dilatation    Carotid artery plaque, bilateral    Hypercholesterolemia       Endocrine    Overweight (BMI 25.0-29.9)       Other    Other fatigue        Plan:         ADD BENICAR, FOR AFTERLOAD REDUCTION ATHEROSCLEROSIS, WILL NEED FURTHER EVALUATION ANGIOGRAM/ LHC NEXT WEEK, RISK AND ALTERNATIVE EXPLAINED PATIENT HIS WIFE, CLASS 2-3 ANGINA NO OVERT HEART FAILURE BUT SOME DIASTOLIC HEART FAILURE RELATED TO VALVULAR DISEASE NO TIA TYPE SYMPTOMS NO NEAR-SYNCOPE, DIET EXERCISE WEIGHT LOSS  Abnormal nuclear stress test  -     Case Request-Cath Lab: Angiogram, Coronary, with Left Heart Cath; Standing  -     Establish IV access and maintain saline lock; Standing  -     Hold Warfarin; Standing  -     Hold Enoxaparin/subcutaneous Heparin; Standing  -     Hold Heparin drip; Standing  -     Height and weight; Standing  -     Verify informed consent; Standing  -     Vital signs; Standing  -     Cardiac  Monitoring - Adult; Standing  -     Pulse Oximetry Q4H; Standing  -     Diet NPO; Standing  -     Basic metabolic panel; Standing  -     Full code; Standing    Mitral regurgitation and aortic stenosis  -     Case Request-Cath Lab: Angiogram, Coronary, with Left Heart Cath; Standing  -     Establish IV access and maintain saline lock; Standing  -     Hold Warfarin; Standing  -     Hold Enoxaparin/subcutaneous Heparin; Standing  -     Hold Heparin drip; Standing  -     Height and weight; Standing  -     Verify informed consent; Standing  -     Vital signs; Standing  -     Cardiac Monitoring - Adult; Standing  -     Pulse Oximetry Q4H; Standing  -     Diet NPO; Standing  -     Basic metabolic panel; Standing  -     Full code; Standing    Ascending aorta dilatation    Carotid artery plaque, bilateral    Hypercholesterolemia    Overweight (BMI 25.0-29.9)    SOB (shortness of breath)  -     Case Request-Cath Lab: Angiogram, Coronary, with Left Heart Cath; Standing  -     Establish IV access and maintain saline lock; Standing  -     Hold Warfarin; Standing  -     Hold Enoxaparin/subcutaneous Heparin; Standing  -     Hold Heparin drip; Standing  -     Height and weight; Standing  -     Verify informed consent; Standing  -     Vital signs; Standing  -     Cardiac Monitoring - Adult; Standing  -     Pulse Oximetry Q4H; Standing  -     Diet NPO; Standing  -     Basic metabolic panel; Standing  -     Full code; Standing    Other fatigue  Comments:  worse    Other orders  -     0.9%  NaCl infusion  -     aspirin chewable tablet 81 mg  -     clopidogreL tablet 300 mg  -     olmesartan (BENICAR) 5 MG Tab; Take 1 tablet (5 mg total) by mouth every evening.  Dispense: 90 tablet; Refill: 0  RTC Low level/low impact aerobic exercise 5x's/wk. Heart healthy diet and risk factor modification.    See labs and med orders.    Aerobic exercise 5x's/wk. Heart healthy diet and risk factor modification.    See labs and med orders.

## 2022-10-13 ENCOUNTER — TELEPHONE (OUTPATIENT)
Dept: CARDIOLOGY | Facility: CLINIC | Age: 77
End: 2022-10-13

## 2022-10-13 ENCOUNTER — TELEPHONE (OUTPATIENT)
Dept: AUDIOLOGY | Facility: CLINIC | Age: 77
End: 2022-10-13
Payer: MEDICARE

## 2022-10-13 ENCOUNTER — TELEPHONE (OUTPATIENT)
Dept: CARDIOLOGY | Facility: CLINIC | Age: 77
End: 2022-10-13
Payer: MEDICARE

## 2022-10-13 NOTE — TELEPHONE ENCOUNTER
Annie called to inform me that he is not hearing well while hunting and using the new muffs he purchased with his hearing aids. Explained that he should NOT be wearing the aids and should only focus on protection from gunfire to prevent further hearing loss. She voiced understanding and will pass the information to the pt.        ----- Message from Taylor Mei sent at 10/12/2022  4:16 PM CDT -----  Regarding: Hunting - Hearing protection  Please contact patient's wife Annie about hearing protection for the patient when he goes hunting. [phone]

## 2022-10-13 NOTE — TELEPHONE ENCOUNTER
----- Message from Joaquin Freedman MD sent at 10/7/2022  3:28 PM CDT -----  ABNORMAL, keep appointment

## 2022-10-24 ENCOUNTER — TELEPHONE (OUTPATIENT)
Dept: CARDIOLOGY | Facility: CLINIC | Age: 77
End: 2022-10-24
Payer: MEDICARE

## 2022-10-24 NOTE — TELEPHONE ENCOUNTER
----- Message from Moni Montenegro sent at 10/24/2022  1:16 PM CDT -----  Contact: Wife  Type:  Sooner Appointment Request    Caller is requesting a sooner appointment.  Caller declined first available appointment listed below.  Caller will not accept being placed on the waitlist and is requesting a message be sent to doctor.    Name of Caller:  Pt wife  When is the first available appointment?  11/23  Symptoms:  Follow up  Best Call Back Number:  [phone]    Additional Information:  Pt wife was calling to get an earlier appt wife states they prefer to come a week earlier than November 23 rd wife stated to please call back if they can be squeezed in Thank you

## 2022-11-07 ENCOUNTER — TELEPHONE (OUTPATIENT)
Dept: CARDIOLOGY | Facility: CLINIC | Age: 77
End: 2022-11-07
Payer: MEDICARE

## 2022-11-11 ENCOUNTER — OFFICE VISIT (OUTPATIENT)
Dept: FAMILY MEDICINE | Facility: CLINIC | Age: 77
End: 2022-11-11
Payer: MEDICARE

## 2022-11-11 VITALS
WEIGHT: 192.44 LBS | DIASTOLIC BLOOD PRESSURE: 80 MMHG | HEART RATE: 80 BPM | BODY MASS INDEX: 27.55 KG/M2 | SYSTOLIC BLOOD PRESSURE: 128 MMHG | HEIGHT: 70 IN | OXYGEN SATURATION: 95 %

## 2022-11-11 DIAGNOSIS — N32.81 OVERACTIVE BLADDER: ICD-10-CM

## 2022-11-11 DIAGNOSIS — J01.90 ACUTE SINUSITIS, RECURRENCE NOT SPECIFIED, UNSPECIFIED LOCATION: Primary | ICD-10-CM

## 2022-11-11 LAB
CTP QC/QA: YES
CTP QC/QA: YES
POC MOLECULAR INFLUENZA A AGN: NEGATIVE
POC MOLECULAR INFLUENZA B AGN: NEGATIVE
SARS-COV-2 RDRP RESP QL NAA+PROBE: NEGATIVE

## 2022-11-11 PROCEDURE — 87635 SARS-COV-2 COVID-19 AMP PRB: CPT | Mod: PBBFAC,PO | Performed by: INTERNAL MEDICINE

## 2022-11-11 PROCEDURE — 99214 OFFICE O/P EST MOD 30 MIN: CPT | Mod: S$PBB,,, | Performed by: INTERNAL MEDICINE

## 2022-11-11 PROCEDURE — 99999 PR PBB SHADOW E&M-EST. PATIENT-LVL III: CPT | Mod: PBBFAC,,, | Performed by: INTERNAL MEDICINE

## 2022-11-11 PROCEDURE — 99213 OFFICE O/P EST LOW 20 MIN: CPT | Mod: PBBFAC,PO | Performed by: INTERNAL MEDICINE

## 2022-11-11 PROCEDURE — 99999 PR PBB SHADOW E&M-EST. PATIENT-LVL III: ICD-10-PCS | Mod: PBBFAC,,, | Performed by: INTERNAL MEDICINE

## 2022-11-11 PROCEDURE — 99214 PR OFFICE/OUTPT VISIT, EST, LEVL IV, 30-39 MIN: ICD-10-PCS | Mod: S$PBB,,, | Performed by: INTERNAL MEDICINE

## 2022-11-11 PROCEDURE — 87502 INFLUENZA DNA AMP PROBE: CPT | Mod: PBBFAC,PO | Performed by: INTERNAL MEDICINE

## 2022-11-11 RX ORDER — DOXYCYCLINE 100 MG/1
100 CAPSULE ORAL 2 TIMES DAILY
Qty: 14 CAPSULE | Refills: 0 | Status: SHIPPED | OUTPATIENT
Start: 2022-11-11 | End: 2023-03-30

## 2022-11-11 RX ORDER — OXYBUTYNIN CHLORIDE 10 MG/1
10 TABLET, EXTENDED RELEASE ORAL DAILY
Qty: 90 TABLET | Refills: 3 | Status: SHIPPED | OUTPATIENT
Start: 2022-11-11 | End: 2023-11-29 | Stop reason: SDUPTHER

## 2022-11-11 NOTE — PROGRESS NOTES
Subjective     Eligio Richardson is a 77 y.o. old, male here for Headache, Eye Pain, Neck Pain, Hip Pain, Back Pain, Cough, and Nasal Congestion    78 y/o with PMH of CAD, CLL, prostate ca followed by MD linares, BPH, CKD stage 3, CLBP.  Here today with his wife.    Last week treated for sinusitis in [place] with steroid shot, z-pack, steroid pack. He got much better but then got much worse within a few days. He has had myalgias and worsening symptoms. Tmax of 101.5. denies NEFF or CP.    Review of Systems   Constitutional:  Positive for chills, fever and malaise/fatigue.   HENT:  Positive for congestion. Negative for ear discharge, ear pain and sore throat.         Chronic hearing loss   Eyes:  Positive for pain.   Respiratory:  Positive for cough and sputum production. Negative for shortness of breath and wheezing.    Cardiovascular: Negative.    Skin: Negative.    Medications     Outpatient Medications Marked as Taking for the 11/11/22 encounter (Office Visit) with Acosta Corley MD   Medication Sig Dispense Refill    acebutoloL (SECTRAL) 400 mg capsule Take 400 mg by mouth 2 (two) times daily.      aluminum hydrox-magnesium carb 254-237.5 mg/5 mL Susp Take 10 mLs by mouth daily as needed (stomach upset).       clopidogreL (PLAVIX) 75 mg tablet Take 1 tablet (75 mg total) by mouth once daily. 30 tablet 11    clopidogreL (PLAVIX) 75 mg tablet Take 1 tablet (75 mg total) by mouth once daily. 30 tablet 11    finasteride (PROSCAR) 5 mg tablet Take 5 mg by mouth once daily.      fluorouraciL (EFUDEX) 5 % cream Apply topically 2 (two) times daily. For 2 weeks. 40 g 1    fluticasone propionate (FLONASE) 50 mcg/actuation nasal spray 2 sprays (100 mcg total) by Each Nostril route 2 (two) times a day. 32 g 11    hydroCHLOROthiazide (HYDRODIURIL) 12.5 MG Tab Take 25 mg by mouth once daily.      HYDROcodone-acetaminophen (NORCO) 7.5-325 mg per tablet Take 1 tablet by mouth every 6 (six) hours as needed.      L  "gasseri/B bifidum/B longum (PROBIOTIC COLON CARE ORAL) Take 1 tablet by mouth once daily.      levothyroxine (SYNTHROID) 88 MCG tablet Take 1 tablet (88 mcg total) by mouth once daily. 90 tablet 3    meloxicam (MOBIC) 15 MG tablet Take 1 tablet (15 mg total) by mouth once daily. 90 tablet 3    olmesartan (BENICAR) 5 MG Tab Take 1 tablet (5 mg total) by mouth every evening. 90 tablet 0    pantoprazole (PROTONIX) 40 MG tablet Take 1 tablet twice a day by oral route in the morning for 90 days.      PROPYLENE GLYCOL (SYSTANE BALANCE OPHT) Place 1 drop into both eyes once daily.       tamsulosin (FLOMAX) 0.4 mg Cap Take 1 capsule (0.4 mg total) by mouth 2 (two) times a day. 180 capsule 3     Objective     /80   Pulse 80   Ht 5' 10" (1.778 m)   Wt 87.3 kg (192 lb 7.4 oz)   SpO2 95%   BMI 27.62 kg/m²   Physical Exam  Constitutional:       General: He is not in acute distress.     Appearance: He is well-developed. He is ill-appearing. He is not toxic-appearing.   HENT:      Head: Normocephalic and atraumatic.      Right Ear: External ear normal.      Left Ear: External ear normal.      Nose: Congestion present.   Eyes:      General:         Right eye: No discharge.         Left eye: No discharge.      Conjunctiva/sclera: Conjunctivae normal.   Cardiovascular:      Rate and Rhythm: Normal rate and regular rhythm.      Heart sounds: Murmur heard.   Pulmonary:      Effort: Pulmonary effort is normal. No respiratory distress.      Breath sounds: Normal breath sounds.   Musculoskeletal:      Cervical back: Neck supple.   Lymphadenopathy:      Cervical: No cervical adenopathy.   Neurological:      Mental Status: He is alert.     Assessment and Plan     Acute sinusitis, recurrence not specified, unspecified location  -     POCT COVID-19 Rapid Screening  -     POCT Influenza A/B Molecular  -     doxycycline (MONODOX) 100 MG capsule; Take 1 capsule (100 mg total) by mouth 2 (two) times daily.  Dispense: 14 capsule; " Refill: 0    Overactive bladder  -     oxybutynin (DITROPAN-XL) 10 MG 24 hr tablet; Take 1 tablet (10 mg total) by mouth once daily.  Dispense: 90 tablet; Refill: 3    Restart ditropan I stopped last time due to worsening symptoms.  Flu and Covid negative, possible worsening sinusitis from before or early pneumonia. Start doxy as above, although he had a reassuring pulm exam.  F/u if worsening or no improvement.    No follow-ups on file.  ___________________  Acosta Corley MD  Internal Medicine and Pediatrics

## 2022-11-16 ENCOUNTER — OFFICE VISIT (OUTPATIENT)
Dept: CARDIOLOGY | Facility: CLINIC | Age: 77
End: 2022-11-16
Payer: MEDICARE

## 2022-11-16 VITALS
SYSTOLIC BLOOD PRESSURE: 120 MMHG | DIASTOLIC BLOOD PRESSURE: 73 MMHG | HEIGHT: 70 IN | HEART RATE: 76 BPM | WEIGHT: 192.88 LBS | BODY MASS INDEX: 27.61 KG/M2

## 2022-11-16 DIAGNOSIS — Z79.02 LONG TERM (CURRENT) USE OF ANTITHROMBOTICS/ANTIPLATELETS: Chronic | ICD-10-CM

## 2022-11-16 DIAGNOSIS — I10 PRIMARY HYPERTENSION: Chronic | ICD-10-CM

## 2022-11-16 DIAGNOSIS — I25.10 CORONARY ARTERY DISEASE INVOLVING NATIVE CORONARY ARTERY OF NATIVE HEART WITHOUT ANGINA PECTORIS: Primary | Chronic | ICD-10-CM

## 2022-11-16 DIAGNOSIS — I08.0 MITRAL REGURGITATION AND AORTIC STENOSIS: Chronic | ICD-10-CM

## 2022-11-16 DIAGNOSIS — Z95.5 STENTED CORONARY ARTERY: ICD-10-CM

## 2022-11-16 DIAGNOSIS — E66.3 OVERWEIGHT (BMI 25.0-29.9): Chronic | ICD-10-CM

## 2022-11-16 DIAGNOSIS — N18.31 STAGE 3A CHRONIC KIDNEY DISEASE: Chronic | ICD-10-CM

## 2022-11-16 DIAGNOSIS — E78.00 HYPERCHOLESTEROLEMIA: Chronic | ICD-10-CM

## 2022-11-16 PROCEDURE — 99214 PR OFFICE/OUTPT VISIT, EST, LEVL IV, 30-39 MIN: ICD-10-PCS | Mod: S$GLB,,, | Performed by: INTERNAL MEDICINE

## 2022-11-16 PROCEDURE — 99214 OFFICE O/P EST MOD 30 MIN: CPT | Mod: S$GLB,,, | Performed by: INTERNAL MEDICINE

## 2022-11-16 RX ORDER — ROSUVASTATIN CALCIUM 10 MG/1
10 TABLET, COATED ORAL NIGHTLY
Qty: 90 TABLET | Refills: 1 | Status: SHIPPED | OUTPATIENT
Start: 2022-11-16 | End: 2023-05-30 | Stop reason: SDUPTHER

## 2022-11-16 NOTE — PROGRESS NOTES
Subjective:    Patient ID:  Eligio Richardson is a 77 y.o. male who presents for Hypertension and Coronary Artery Disease        HPI  STATUS POST CORONARY ANGIOGRAM, PCI OF THE LAD, GRADIENT ACROSS AORTIC VALVE AROUND 20-25 MILLIMETER OF [place], DISCUSSED FINDINGS, WAS DOING WELL TILL RECENT URI,, SINUS AND LUNG CONGESTION, ON ABX, WSTILL WITH COUGH, SEE ROS    Past Medical History:   Diagnosis Date    Anticoagulant long-term use     Cancer     skin cancer to face    Cholelithiasis     Constipation, chronic     severe    Degenerative disc disease, cervical     Gallstones     GERD (gastroesophageal reflux disease)     Hiatal hernia     Hypothyroidism     Inguinal hernia without mention of obstruction or gangrene, unilateral or unspecified, (not specified as recurrent)     Insomnia     Irregular heart beat     currently not an issue and not on any meds for it    Osteoarthritis     Sleep apnea      Past Surgical History:   Procedure Laterality Date    ANGIOGRAM, CORONARY, WITH LEFT HEART CATHETERIZATION N/A 10/18/2022    Procedure: Angiogram, Coronary, with Left Heart Cath;  Surgeon: Joaquin Freedman MD;  Location: [facility] CATH;  Service: Cardiology;  Laterality: N/A;    CHOLECYSTECTOMY      COLONOSCOPY  2013    Dr. Gee    COLONOSCOPY N/A 06/06/2016    Procedure: COLONOSCOPY;  Surgeon: Ricky Arriola MD;  Location: [facility] ENDO;  Service: Endoscopy;  Laterality: N/A;    COLONOSCOPY  07/15/2021    Dr. Hawk    CYSTOSCOPY N/A 06/18/2018    Procedure: CYSTOSCOPY;  Surgeon: Andry Paz MD;  Location: [facility] OR;  Service: Urology;  Laterality: N/A;    CYSTOURETHROSCOPY  10/2019    EGD, WITH BALLOON DILATION N/A 09/21/2021    ELBOW ARTHROPLASTY      EYE SURGERY Bilateral     cataract    FRACTURE SURGERY Right     knee     HIATAL HERNIA REPAIR  2013    [facility]    KNEE ARTHROSCOPY Right     neck injection  10/2019    NECK SURGERY      prostate biospy      normal per pt    SHOULDER SURGERY Bilateral     SINUS SURGERY   "01/2019    Mirna Gill    TONSILLECTOMY      TRANSRECTAL BIOPSY OF PROSTATE WITH ULTRASOUND GUIDANCE N/A 06/18/2018    Procedure: BIOPSY, PROSTATE, TRANSRECTAL APPROACH, WITH US GUIDANCE;  Surgeon: Andry Paz MD;  Location: [facility];  Service: Urology;  Laterality: N/A;    UPPER GASTROINTESTINAL ENDOSCOPY  ~2013     Family History   Problem Relation Age of Onset    Lung cancer Mother     Skin cancer Father     Lung cancer Father     Prostate cancer Maternal Uncle     Prostate cancer Paternal Uncle     Colon cancer Neg Hx     Colon polyps Neg Hx     Esophageal cancer Neg Hx     Stomach cancer Neg Hx     Liver cancer Neg Hx     Crohn's disease Neg Hx     Inflammatory bowel disease Neg Hx     Irritable bowel syndrome Neg Hx      Social History     Socioeconomic History    Marital status:    Occupational History    Occupation: retired, worked at paper [employer]     Comment: [employer]    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"   Tobacco Use    Smoking status: Never    Smokeless tobacco: Never   Substance and Sexual Activity    Alcohol use: Yes     Comment: rarely    Drug use: No    Sexual activity: Yes     Partners: Female       Review of patient's allergies indicates:   Allergen Reactions    Iodinated contrast media Rash    Myrbetriq [mirabegron] Swelling     Chest pain,nausea, abdominal swelling, decrease erection       Current Outpatient Medications:     acebutoloL (SECTRAL) 400 mg capsule, Take 400 mg by mouth 2 (two) times daily., Disp: , Rfl:     aluminum hydrox-magnesium carb 254-237.5 mg/5 mL Susp, Take 10 mLs by mouth daily as needed (stomach upset). , Disp: , Rfl:     clopidogreL (PLAVIX) 75 mg tablet, Take 1 tablet (75 mg total) by mouth once daily., Disp: 30 tablet, Rfl: 11    clopidogreL (PLAVIX) 75 mg tablet, Take 1 tablet (75 mg total) by mouth once daily., Disp: 30 tablet, Rfl: 11    doxycycline (MONODOX) 100 MG capsule, Take 1 capsule (100 mg total) by mouth 2 (two) times daily., Disp: 14 capsule, " Rfl: 0    finasteride (PROSCAR) 5 mg tablet, Take 5 mg by mouth once daily., Disp: , Rfl:     fluorouraciL (EFUDEX) 5 % cream, Apply topically 2 (two) times daily. For 2 weeks., Disp: 40 g, Rfl: 1    fluticasone propionate (FLONASE) 50 mcg/actuation nasal spray, 2 sprays (100 mcg total) by Each Nostril route 2 (two) times a day., Disp: 32 g, Rfl: 11    hydroCHLOROthiazide (HYDRODIURIL) 12.5 MG Tab, Take 25 mg by mouth once daily., Disp: , Rfl:     HYDROcodone-acetaminophen (NORCO) 7.5-325 mg per tablet, Take 1 tablet by mouth every 6 (six) hours as needed., Disp: , Rfl:     L gasseri/B bifidum/B longum (PROBIOTIC COLON CARE ORAL), Take 1 tablet by mouth once daily., Disp: , Rfl:     levothyroxine (SYNTHROID) 88 MCG tablet, Take 1 tablet (88 mcg total) by mouth once daily., Disp: 90 tablet, Rfl: 3    meloxicam (MOBIC) 15 MG tablet, Take 1 tablet (15 mg total) by mouth once daily., Disp: 90 tablet, Rfl: 3    olmesartan (BENICAR) 5 MG Tab, Take 1 tablet (5 mg total) by mouth every evening., Disp: 90 tablet, Rfl: 0    oxybutynin (DITROPAN-XL) 10 MG 24 hr tablet, Take 1 tablet (10 mg total) by mouth once daily., Disp: 90 tablet, Rfl: 3    pantoprazole (PROTONIX) 40 MG tablet, Take 1 tablet twice a day by oral route in the morning for 90 days., Disp: , Rfl:     PROPYLENE GLYCOL (SYSTANE BALANCE OPHT), Place 1 drop into both eyes once daily. , Disp: , Rfl:     tamsulosin (FLOMAX) 0.4 mg Cap, Take 1 capsule (0.4 mg total) by mouth 2 (two) times a day., Disp: 180 capsule, Rfl: 3    rosuvastatin (CRESTOR) 10 MG tablet, Take 1 tablet (10 mg total) by mouth every evening., Disp: 90 tablet, Rfl: 1    Review of Systems   Constitutional: Negative for chills, diaphoresis, fever, malaise/fatigue, night sweats and weight loss.   HENT:  Positive for hearing loss. Negative for congestion and nosebleeds.    Eyes:  Negative for blurred vision and redness.   Cardiovascular:  Negative for chest pain, claudication, cyanosis, dyspnea on  "exertion (MILD), irregular heartbeat, leg swelling, near-syncope, orthopnea, palpitations, paroxysmal nocturnal dyspnea and syncope.        COLD L FOOT   Respiratory:  Positive for cough and sputum production. Negative for hemoptysis and wheezing.    Endocrine: Negative for cold intolerance and heat intolerance.   Hematologic/Lymphatic: Negative for adenopathy. Does not bruise/bleed easily.        LEUKEMIA, PROSTATE CANCER   Skin:  Negative for color change, itching and nail changes.   Musculoskeletal:  Positive for arthritis. Negative for back pain (MILD) and falls.   Gastrointestinal:  Negative for abdominal pain, change in bowel habit, constipation, dysphagia, jaundice, melena and nausea.   Genitourinary:  Negative for dysuria and flank pain.   Neurological:  Negative for brief paralysis, dizziness, focal weakness, light-headedness, loss of balance, numbness and weakness.   Psychiatric/Behavioral:  Negative for altered mental status and depression.    Allergic/Immunologic: Positive for persistent infections.      Objective:      Vitals:    11/16/22 1429   BP: 120/73   Pulse: 76   Weight: 87.5 kg (192 lb 14.4 oz)   Height: 5' 10" (1.778 m)   PainSc:   4   PainLoc: Head     Body mass index is 27.68 kg/m².    Physical Exam  Constitutional:       Appearance: Normal appearance.   HENT:      Head: Normocephalic and atraumatic.   Eyes:      Extraocular Movements: Extraocular movements intact.      Pupils: Pupils are equal, round, and reactive to light.   Cardiovascular:      Rate and Rhythm: Normal rate and regular rhythm.      Pulses: Normal pulses.           Carotid pulses are 2+ on the right side and 2+ on the left side.       Radial pulses are 2+ on the right side and 2+ on the left side.        Posterior tibial pulses are 2+ on the right side and 2+ on the left side.      Heart sounds: Murmur heard.   Systolic murmur is present with a grade of 2/6 at the upper right sternal border.     No friction rub. No gallop. "   Pulmonary:      Effort: Pulmonary effort is normal.      Breath sounds: Rhonchi present. No rales.   Abdominal:      Palpations: Abdomen is soft.      Tenderness: There is no abdominal tenderness.   Musculoskeletal:      Cervical back: Neck supple.      Right lower leg: No edema.      Left lower leg: No edema.   Skin:     Capillary Refill: Capillary refill takes less than 2 seconds.   Neurological:      General: No focal deficit present.      Mental Status: He is alert and oriented to person, place, and time.      Cranial Nerves: Cranial nerve deficit ([place]) present.   Psychiatric:         Mood and Affect: Mood normal.         Speech: Speech normal.         Behavior: Behavior normal.             ..    Chemistry        Component Value Date/Time     10/18/2022 0705    K 4.6 10/18/2022 0705     10/18/2022 0705    CO2 27 10/18/2022 0705    BUN 31 (H) 10/18/2022 0705    CREATININE 1.08 10/18/2022 0705     (H) 10/18/2022 0705        Component Value Date/Time    CALCIUM 9.3 10/18/2022 0705    ALKPHOS 45 (L) 10/05/2022 0741    AST 21 10/05/2022 0741    ALT 22 10/05/2022 0741    BILITOT 1.3 (H) 10/05/2022 0741    ESTGFRAFRICA >60 12/07/2020 0905    EGFRNONAA >60 12/07/2020 0905            ..  Lab Results   Component Value Date    CHOL 154 12/07/2020    CHOL 149 10/19/2018    CHOL 165 09/06/2017     Lab Results   Component Value Date    HDL 51 12/07/2020    HDL 52 10/19/2018    HDL 47 09/06/2017     Lab Results   Component Value Date    LDLCALC 88.8 12/07/2020    LDLCALC 83.8 10/19/2018    LDLCALC 98.8 09/06/2017     Lab Results   Component Value Date    TRIG 71 12/07/2020    TRIG 66 10/19/2018    TRIG 96 09/06/2017     Lab Results   Component Value Date    CHOLHDL 33.1 12/07/2020    CHOLHDL 34.9 10/19/2018    CHOLHDL 28.5 09/06/2017     ..  Lab Results   Component Value Date    WBC 8.67 10/05/2022    HGB 14.1 10/05/2022    HCT 43.0 10/05/2022     (H) 10/05/2022     10/05/2022       Test(s)  Reviewed  I have reviewed the following in detail:  [] Stress test   [x] Angiography   [] Echocardiogram   [x] Labs   [] Other:       Assessment:         ICD-10-CM ICD-9-CM   1. Coronary artery disease involving native coronary artery of native heart without angina pectoris  I25.10 414.01   2. Long term (current) use of antithrombotics/antiplatelets  Z79.02 V58.63   3. Hypercholesterolemia  E78.00 272.0   4. Mitral regurgitation and aortic stenosis  I08.0 396.2   5. Stented coronary artery  Z95.5 V45.82   6. Overweight (BMI 25.0-29.9)  E66.3 278.02   7. Stage 3a chronic kidney disease  N18.31 585.3   8. Primary hypertension  I10 401.9     Problem List Items Addressed This Visit          Cardiac/Vascular    Primary hypertension    Relevant Orders    Comprehensive Metabolic Panel    Mitral regurgitation and aortic stenosis    Hypercholesterolemia    Relevant Orders    Comprehensive Metabolic Panel    Lipid Panel    Coronary artery disease involving native coronary artery of native heart without angina pectoris - Primary    Relevant Orders    Comprehensive Metabolic Panel    Lipid Panel    Stented coronary artery       Renal/    Stage 3a chronic kidney disease       Hematology    Long term (current) use of antithrombotics/antiplatelets    Relevant Orders    Hemoglobin       Endocrine    Overweight (BMI 25.0-29.9)        Plan:     ADD CRESTOR, FOR DYSLIPIDEMIA IN THIS PATIENT WHO HAS CAD CONTINUE PLAVIX, OK FOR ZINC , VIT C, FOLLOW-UP WITH PCP,ALL CV CLINICALLY STABLE, NO ANGINA, NO HF, NO TIA, NO CLINICAL ARRHYTHMIA,CONTINUE CURRENT MEDS, EDUCATION, DIET, EXERCISE , WEIGHT LOSS RETURN TO CLINIC IN 3 MO WITH LABS, DISCUSSED PLAN WITH THE PATIENT AND HIS WIFE      Coronary artery disease involving native coronary artery of native heart without angina pectoris  -     Comprehensive Metabolic Panel; Future; Expected date: 02/16/2023  -     Lipid Panel; Future; Expected date: 02/16/2023    Long term (current) use of  antithrombotics/antiplatelets  -     Hemoglobin; Future; Expected date: 02/16/2023    Hypercholesterolemia  Comments:  STATIN  Orders:  -     Comprehensive Metabolic Panel; Future; Expected date: 02/16/2023  -     Lipid Panel; Future; Expected date: 02/16/2023    Mitral regurgitation and aortic stenosis    Stented coronary artery    Overweight (BMI 25.0-29.9)    Stage 3a chronic kidney disease    Primary hypertension  -     Comprehensive Metabolic Panel; Future; Expected date: 02/16/2023    Other orders  -     rosuvastatin (CRESTOR) 10 MG tablet; Take 1 tablet (10 mg total) by mouth every evening.  Dispense: 90 tablet; Refill: 1  RTC Low level/low impact aerobic exercise 5x's/wk. Heart healthy diet and risk factor modification.    See labs and med orders.    Aerobic exercise 5x's/wk. Heart healthy diet and risk factor modification.    See labs and med orders.

## 2022-11-25 ENCOUNTER — HOSPITAL ENCOUNTER (OUTPATIENT)
Dept: RADIOLOGY | Facility: HOSPITAL | Age: 77
Discharge: HOME | End: 2022-11-25
Attending: INTERNAL MEDICINE
Payer: MEDICARE

## 2022-11-25 ENCOUNTER — TELEPHONE (OUTPATIENT)
Dept: FAMILY MEDICINE | Facility: CLINIC | Age: 77
End: 2022-11-25
Payer: MEDICARE

## 2022-11-25 ENCOUNTER — OFFICE VISIT (OUTPATIENT)
Dept: FAMILY MEDICINE | Facility: CLINIC | Age: 77
End: 2022-11-25
Payer: MEDICARE

## 2022-11-25 VITALS
SYSTOLIC BLOOD PRESSURE: 124 MMHG | HEIGHT: 70 IN | HEART RATE: 68 BPM | OXYGEN SATURATION: 95 % | WEIGHT: 199.31 LBS | DIASTOLIC BLOOD PRESSURE: 62 MMHG | BODY MASS INDEX: 28.53 KG/M2

## 2022-11-25 DIAGNOSIS — R05.1 ACUTE COUGH: Primary | ICD-10-CM

## 2022-11-25 DIAGNOSIS — J01.40 ACUTE NON-RECURRENT PANSINUSITIS: Primary | ICD-10-CM

## 2022-11-25 DIAGNOSIS — R05.1 ACUTE COUGH: ICD-10-CM

## 2022-11-25 DIAGNOSIS — C91.10 CLL (CHRONIC LYMPHOCYTIC LEUKEMIA): ICD-10-CM

## 2022-11-25 PROCEDURE — 99999 PR PBB SHADOW E&M-EST. PATIENT-LVL IV: CPT | Mod: PBBFAC,,, | Performed by: INTERNAL MEDICINE

## 2022-11-25 PROCEDURE — 71046 X-RAY EXAM CHEST 2 VIEWS: CPT | Mod: TC,FY,PO

## 2022-11-25 PROCEDURE — 99214 OFFICE O/P EST MOD 30 MIN: CPT | Mod: S$PBB,,, | Performed by: INTERNAL MEDICINE

## 2022-11-25 PROCEDURE — 99999 PR PBB SHADOW E&M-EST. PATIENT-LVL IV: ICD-10-PCS | Mod: PBBFAC,,, | Performed by: INTERNAL MEDICINE

## 2022-11-25 PROCEDURE — 71046 X-RAY EXAM CHEST 2 VIEWS: CPT | Mod: 26,,, | Performed by: RADIOLOGY

## 2022-11-25 PROCEDURE — 71046 XR CHEST PA AND LATERAL: ICD-10-PCS | Mod: 26,,, | Performed by: RADIOLOGY

## 2022-11-25 PROCEDURE — 99214 PR OFFICE/OUTPT VISIT, EST, LEVL IV, 30-39 MIN: ICD-10-PCS | Mod: S$PBB,,, | Performed by: INTERNAL MEDICINE

## 2022-11-25 PROCEDURE — 99214 OFFICE O/P EST MOD 30 MIN: CPT | Mod: PBBFAC,25,PO | Performed by: INTERNAL MEDICINE

## 2022-11-25 RX ORDER — AMOXICILLIN AND CLAVULANATE POTASSIUM 875; 125 MG/1; MG/1
1 TABLET, FILM COATED ORAL EVERY 12 HOURS
Qty: 14 TABLET | Refills: 0 | Status: SHIPPED | OUTPATIENT
Start: 2022-11-25 | End: 2022-12-02

## 2022-11-25 NOTE — TELEPHONE ENCOUNTER
"Spoke with patient, he saw Dr. Corley on 11/11 for sinus problems. Dr. Corley prescribed him some antibiotics and patient stated he was prescribed another round of antibiotics before that by another Dr for the same issues. Patient states he is not any better. When I asked him about his current symtpoms he states he "feels like its in his lungs". He reports cough, headaches, some shortness of breath.  Dr. Corley did state in his office note " Flu and Covid negative, possible worsening sinusitis from before or early pneumonia."    Is it possible to order him an x-ray to come get? Please advise in Dr. Corley absence.  "
----- Message from Madiha Mar, Patient Care Assistant sent at 11/25/2022  8:30 AM CST -----  Regarding: appointment  Contact: pt's wife  Type:  Sooner Appointment Request    Caller is requesting a sooner appointment.  Caller declined first available appointment listed below.  Caller will not accept being placed on the waitlist and is requesting a message be sent to doctor.    Name of Caller:  pt's wife  When is the first available appointment?  1/9/22  Symptoms:  sinus infection coughing body aches   Best Call Back Number:  [phone] (home) [phone] (work)    Additional Information:  please call pt to advise. Thanks!     
I'll see him today and we'll get a CXR before our office visit.   
Spoke with patient wife Annie, got patient scheduled for an appointment with Dr. Johnston and chest x-ray before appointment. Patient wife verbalized understanding.  
normal...

## 2022-11-25 NOTE — PROGRESS NOTES
Patient ID: Eligio Richardson     Chief Complaint:   Chief Complaint   Patient presents with    Sinusitis        HPI:  New patient to me today with a bit of a prolonged illness.  Patient states that he has been ill for the past month and a tend to agree with him.  He started out with flu-like symptoms/symptoms of a sinus infection 1 month ago.  His most prominent symptom was pain in his eyes when he tried to look around.  He also had sinus congestion and he has chronic headaches but he also had a headache.  He would a fever of 101 1 day and he also had a cough that was productive of some gray phlegm.  He presented to urgent care where they checked him for the flu and was negative so they gave him steroid shot and a steroid pack and a Z-Bharathi.  He felt better for a few days but his symptoms gradually returned.  He went back to the urgent care and they did not find anything so they sent him home without therapy.  Saw 1 of my partners a few days later and he was also concerned about a late and sinus infection so we gave him doxycycline.  Patient felt a little better after that but again his symptoms returned.  Currently now he still has a cough.  His phlegm is not gray but there is a whole lot of it.  He still does have headaches.  He has not had any fevers.  He still does not feel quite like himself to the point that he has not gone hunting in that is odd because it is hunting season.  I think he has a untreated sinus infection so I would like to give him penicillin for least 7 days.  I also want to check a chest x-ray because he is having some shortness of breath on occasion.  If these not getting better by Monday or Tuesday I want to get a CT scan of his head.  He does have chronic lymphocytic leukemia shows immune system is not as fibrin as a normal persons.  He does have prostate cancer but he is not currently getting therapy and he is just in the watch and wait phase. He did get a coronary stent 1 month ago- around  the time his illness was starting. Update: CXR looks good     Review of Systems   Constitutional: Negative.    HENT: Negative.     Eyes: Negative.    Respiratory: Negative.     Cardiovascular: Negative.    Gastrointestinal: Negative.    Endocrine: Negative.    Genitourinary: Negative.    Musculoskeletal: Negative.    Skin: Negative.    Allergic/Immunologic: Negative.    Neurological: Negative.    Hematological: Negative.    Psychiatric/Behavioral: Negative.          Objective:      Physical Exam   Physical Exam  Vitals and nursing note reviewed.   Constitutional:       Appearance: Normal appearance. He is well-developed. He is ill-appearing.   HENT:      Head: Normocephalic and atraumatic.      Right Ear: Ear canal and external ear normal.      Left Ear: Ear canal and external ear normal.      Ears:      Comments: Bilateral cloudy tympanic membranes with ? Middle ear effusion      Nose: Nose normal.   Eyes:      Extraocular Movements: Extraocular movements intact.      Conjunctiva/sclera: Conjunctivae normal.      Pupils: Pupils are equal, round, and reactive to light.   Cardiovascular:      Rate and Rhythm: Normal rate and regular rhythm.      Pulses: Normal pulses.      Heart sounds: Murmur heard.   Pulmonary:      Effort: Pulmonary effort is normal.      Breath sounds: Normal breath sounds.   Abdominal:      General: Bowel sounds are normal.      Palpations: Abdomen is soft.   Musculoskeletal:         General: Normal range of motion.      Cervical back: Normal range of motion and neck supple.   Skin:     General: Skin is warm and dry.      Capillary Refill: Capillary refill takes less than 2 seconds.   Neurological:      General: No focal deficit present.      Mental Status: He is alert and oriented to person, place, and time.   Psychiatric:         Mood and Affect: Mood normal.         Behavior: Behavior normal.         Thought Content: Thought content normal.         Judgment: Judgment normal.          Vitals:  "  Vitals:    11/25/22 1325   BP: 124/62   Pulse: 68   SpO2: 95%   Weight: 90.4 kg (199 lb 4.7 oz)   Height: 5' 10" (1.778 m)          Current Outpatient Medications:     acebutoloL (SECTRAL) 400 mg capsule, Take 400 mg by mouth 2 (two) times daily., Disp: , Rfl:     aluminum hydrox-magnesium carb 254-237.5 mg/5 mL Susp, Take 10 mLs by mouth daily as needed (stomach upset). , Disp: , Rfl:     clopidogreL (PLAVIX) 75 mg tablet, Take 1 tablet (75 mg total) by mouth once daily., Disp: 30 tablet, Rfl: 11    clopidogreL (PLAVIX) 75 mg tablet, Take 1 tablet (75 mg total) by mouth once daily., Disp: 30 tablet, Rfl: 11    doxycycline (MONODOX) 100 MG capsule, Take 1 capsule (100 mg total) by mouth 2 (two) times daily., Disp: 14 capsule, Rfl: 0    finasteride (PROSCAR) 5 mg tablet, Take 5 mg by mouth once daily., Disp: , Rfl:     fluorouraciL (EFUDEX) 5 % cream, Apply topically 2 (two) times daily. For 2 weeks., Disp: 40 g, Rfl: 1    fluticasone propionate (FLONASE) 50 mcg/actuation nasal spray, 2 sprays (100 mcg total) by Each Nostril route 2 (two) times a day., Disp: 32 g, Rfl: 11    hydroCHLOROthiazide (HYDRODIURIL) 12.5 MG Tab, Take 25 mg by mouth once daily., Disp: , Rfl:     HYDROcodone-acetaminophen (NORCO) 7.5-325 mg per tablet, Take 1 tablet by mouth every 6 (six) hours as needed., Disp: , Rfl:     L gasseri/B bifidum/B longum (PROBIOTIC COLON CARE ORAL), Take 1 tablet by mouth once daily., Disp: , Rfl:     levothyroxine (SYNTHROID) 88 MCG tablet, Take 1 tablet (88 mcg total) by mouth once daily., Disp: 90 tablet, Rfl: 3    meloxicam (MOBIC) 15 MG tablet, Take 1 tablet (15 mg total) by mouth once daily., Disp: 90 tablet, Rfl: 3    olmesartan (BENICAR) 5 MG Tab, Take 1 tablet (5 mg total) by mouth every evening., Disp: 90 tablet, Rfl: 0    oxybutynin (DITROPAN-XL) 10 MG 24 hr tablet, Take 1 tablet (10 mg total) by mouth once daily., Disp: 90 tablet, Rfl: 3    pantoprazole (PROTONIX) 40 MG tablet, Take 1 tablet twice a " day by oral route in the morning for 90 days., Disp: , Rfl:     PROPYLENE GLYCOL (SYSTANE BALANCE OPHT), Place 1 drop into both eyes once daily. , Disp: , Rfl:     rosuvastatin (CRESTOR) 10 MG tablet, Take 1 tablet (10 mg total) by mouth every evening., Disp: 90 tablet, Rfl: 1    tamsulosin (FLOMAX) 0.4 mg Cap, Take 1 capsule (0.4 mg total) by mouth 2 (two) times a day., Disp: 180 capsule, Rfl: 3    amoxicillin-clavulanate 875-125mg (AUGMENTIN) 875-125 mg per tablet, Take 1 tablet by mouth every 12 (twelve) hours. for 7 days, Disp: 14 tablet, Rfl: 0   Assessment:       Patient Active Problem List    Diagnosis Date Noted    Coronary artery disease involving native coronary artery of native heart without angina pectoris 11/16/2022    Long term (current) use of antithrombotics/antiplatelets 11/16/2022    Stented coronary artery 11/16/2022    Abnormal nuclear stress test 10/12/2022    Mitral regurgitation and aortic stenosis 10/12/2022    Ascending aorta dilatation 10/12/2022    Carotid artery plaque, bilateral 10/12/2022    Hypercholesterolemia 10/12/2022    SOB (shortness of breath) 09/12/2022    Nonspecific abnormal electrocardiogram (ECG) (EKG) 09/12/2022    Primary hypertension 09/12/2022    Stage 3a chronic kidney disease 09/12/2022    Systolic murmur 09/12/2022    Other fatigue 09/12/2022    Osteoarthritis 02/28/2020    CLL (chronic lymphocytic leukemia) 11/28/2018    Prostate cancer 09/25/2018    Overweight (BMI 25.0-29.9) 09/25/2018    Benign prostatic hyperplasia with lower urinary tract symptoms 06/18/2018    TMJ dysfunction 09/12/2017    Elevated PSA 03/13/2017    Gastroesophageal reflux disease 05/20/2016    Hypothyroidism 05/20/2016    Degenerative disc disease, cervical 05/20/2016    Calculus of gallbladder without cholecystitis without obstruction 05/20/2016    Spasm of muscle 08/13/2014    Constipation by delayed colonic transit 07/01/2013          Plan:       Eligio Richardson  was seen today for  follow-up and may need lab work.    Diagnoses and all orders for this visit:    Eligio was seen today for sinusitis.    Diagnoses and all orders for this visit:    Acute non-recurrent pansinusitis  -     amoxicillin-clavulanate 875-125mg (AUGMENTIN) 875-125 mg per tablet; Take 1 tablet by mouth every 12 (twelve) hours. for 7 days  Consider Head CT if not improving by Monday     CLL (chronic lymphocytic leukemia)   WBC normal

## 2022-11-28 ENCOUNTER — TELEPHONE (OUTPATIENT)
Dept: FAMILY MEDICINE | Facility: CLINIC | Age: 77
End: 2022-11-28
Payer: MEDICARE

## 2022-11-28 DIAGNOSIS — J32.9 RECURRENT SINUS INFECTIONS: Primary | ICD-10-CM

## 2022-11-28 NOTE — TELEPHONE ENCOUNTER
Follow up from 11/25 visit.   Patient is feeling no better.   Symptoms for one month.     Requesting additional assistance

## 2022-11-28 NOTE — TELEPHONE ENCOUNTER
----- Message from Shania Lehman sent at 11/28/2022  1:26 PM CST -----  Name of Who is Calling: CONNIE ROSARIO [[id number]]      What is the request in detail: Pt called to inform staff he has not been feeling better since last appt on 11/25/2022. Please advise!        Can the clinic reply by [facility]: NO        [facility]: [phone]

## 2022-11-28 NOTE — TELEPHONE ENCOUNTER
----- Message from Iain Regalado sent at 11/28/2022  8:30 AM CST -----  Type: Needs Medical Advice  Who Called:  Wife/ Ariannaalison Richardson  Symptoms (please be specific):  Sinus pressure  How long has patient had these symptoms:  1 month  Pharmacy name and phone #:    Best Call Back Number: [phone]  Additional Information: Please call to advise

## 2022-11-29 ENCOUNTER — TELEPHONE (OUTPATIENT)
Dept: FAMILY MEDICINE | Facility: CLINIC | Age: 77
End: 2022-11-29
Payer: MEDICARE

## 2022-11-29 NOTE — TELEPHONE ENCOUNTER
----- Message from Tasneem Piña sent at 11/29/2022  8:35 AM CST -----  Type:  Same Day Appointment Request    Caller is requesting a same day appointment.  Caller declined first available appointment listed below.      Name of Caller:  pt wife, Annie  When is the first available appointment?  12/2  Symptoms:  sinus f/u pt never got better--please call and advise  Best Call Back Number:  [phone] (home) [phone] (work)  Additional Information:  pt wife said she call yesterday and never hear back and the dr told her to call back if he did not get better so he can get him in-- Thank you

## 2022-12-02 ENCOUNTER — HOSPITAL ENCOUNTER (OUTPATIENT)
Dept: RADIOLOGY | Facility: HOSPITAL | Age: 77
Discharge: HOME | End: 2022-12-02
Attending: INTERNAL MEDICINE
Payer: MEDICARE

## 2022-12-02 DIAGNOSIS — J32.9 RECURRENT SINUS INFECTIONS: ICD-10-CM

## 2022-12-02 PROCEDURE — 70450 CT HEAD/BRAIN W/O DYE: CPT | Mod: TC,PO

## 2022-12-02 PROCEDURE — 70450 CT HEAD/BRAIN W/O DYE: CPT | Mod: 26,,, | Performed by: RADIOLOGY

## 2022-12-02 PROCEDURE — 70450 CT HEAD WITHOUT CONTRAST: ICD-10-PCS | Mod: 26,,, | Performed by: RADIOLOGY

## 2022-12-06 ENCOUNTER — TELEPHONE (OUTPATIENT)
Dept: FAMILY MEDICINE | Facility: CLINIC | Age: 77
End: 2022-12-06
Payer: MEDICARE

## 2022-12-06 ENCOUNTER — TELEPHONE (OUTPATIENT)
Dept: OTOLARYNGOLOGY | Facility: CLINIC | Age: 77
End: 2022-12-06
Payer: MEDICARE

## 2022-12-06 DIAGNOSIS — J32.9 RECURRENT SINUS INFECTIONS: Primary | ICD-10-CM

## 2022-12-06 RX ORDER — CEFDINIR 300 MG/1
300 CAPSULE ORAL 2 TIMES DAILY
Qty: 14 CAPSULE | Refills: 0 | Status: SHIPPED | OUTPATIENT
Start: 2022-12-06 | End: 2022-12-13

## 2022-12-06 NOTE — TELEPHONE ENCOUNTER
I sent a prescription for Cefdinir 300 mg twice daily x 7 days. I do want you to see ENT. Whom can I refer you to?

## 2022-12-06 NOTE — TELEPHONE ENCOUNTER
----- Message from Lynne Gilbert sent at 12/6/2022  2:51 PM CST -----  Contact: pt spouse  Type:  Sooner Appointment Request    Caller is requesting a sooner appointment.  Caller declined first available appointment listed below.  Caller will not accept being placed on the waitlist and is requesting a message be sent to doctor.    Name of Caller:  pt spouse   When is the first available appointment?  02/2023  Symptoms:  sinus infection for months now   Best Call Back Number:  [phone] or [phone]    Additional Information:  pt spouse states they were referred by Travis Johnston and they are trying to get a appt soon as possible would be a new pt. Please advise.

## 2022-12-06 NOTE — TELEPHONE ENCOUNTER
I'm sorry for the delay. Head CT does show sinus infection in a few sinuses. It can take quite a while for it to resolve. I want to send you to ENT for a possible surgical consideration. Do you need another week of antibiotics?

## 2022-12-06 NOTE — TELEPHONE ENCOUNTER
----- Message from Moni Montenegro sent at 12/5/2022  3:49 PM CST -----  Contact: Daughter  Type: Needs Medical Advice    Who Called:Pt daughter Darcy   Best Call Back Number:[phone]    Additional Information Requesting a call back regarding Pt daughter was calling in regards to pt CT Scan results daughter stated they wanted to go over them with Dr please call when available Thank you  Please Advise-Thank you

## 2022-12-07 ENCOUNTER — PATIENT MESSAGE (OUTPATIENT)
Dept: CARDIOLOGY | Facility: CLINIC | Age: 77
End: 2022-12-07
Payer: MEDICARE

## 2022-12-08 ENCOUNTER — TELEPHONE (OUTPATIENT)
Dept: OTOLARYNGOLOGY | Facility: CLINIC | Age: 77
End: 2022-12-08
Payer: MEDICARE

## 2022-12-08 NOTE — TELEPHONE ENCOUNTER
----- Message from Tasneem Piña sent at 12/8/2022  8:52 AM CST -----  Type:  Same Day Appointment Request    Caller is requesting a same day appointment.  Caller declined first available appointment listed below.      Name of Caller:  pt wife, Annie  When is the first available appointment?  1/17  Symptoms:  sinus infection for months / see ct scan--said he need to be seen today in so much pain--please call and advise  Best Call Back Number:  [phone] (home) [phone] (work)    Additional Information:   thank you

## 2022-12-08 NOTE — TELEPHONE ENCOUNTER
S/w wife and informed no ENT dr's in clinic the rest of this week. Appt with Dr. Wiggins on Thursday is the soonest ENT can see pt. Wife verbalized understanding.

## 2022-12-09 ENCOUNTER — TELEPHONE (OUTPATIENT)
Dept: CARDIOLOGY | Facility: CLINIC | Age: 77
End: 2022-12-09
Payer: MEDICARE

## 2022-12-12 ENCOUNTER — TELEPHONE (OUTPATIENT)
Dept: CARDIOLOGY | Facility: CLINIC | Age: 77
End: 2022-12-12
Payer: MEDICARE

## 2022-12-12 NOTE — TELEPHONE ENCOUNTER
Spoke to pt and wife and advised per Dr Freedman; pt is going to restart the Plavix and postpone injection

## 2022-12-12 NOTE — TELEPHONE ENCOUNTER
Pt has already been off the Plavix for a week and wants to know if he can wait one more day to restart it to get injection on Wednesday (insisted I ask since he already stopped it)

## 2022-12-12 NOTE — TELEPHONE ENCOUNTER
----- Message from Roseann Berger sent at 12/12/2022 12:23 PM CST -----  Contact: Self  Type:  Patient Returning Call    Who Called:  Pt Wife  Who Left Message for Patient:  Mayra  Does the patient know what this is regarding?:  pt  Best Call Back Number:  [phone]  Additional Information:  Thank You

## 2022-12-12 NOTE — TELEPHONE ENCOUNTER
----- Message from Abida Marion sent at 12/12/2022 11:33 AM CST -----  Type: Needs Medical Advice  Who Called:  pt wife xochitl Seay Call Back Number:  [phone] Additional Information: pt needs a released form to get a shot in neck and sts pt has to stay on rx for 3 months ,,needs clarification     Please advise thank you

## 2022-12-12 NOTE — TELEPHONE ENCOUNTER
----- Message from Leidy Lomeli sent at 12/12/2022  1:07 PM CST -----  Regarding: advise  Contact: patient  Type: Needs Medical Advice  Who Called:  patient  Symptoms (please be specific):    How long has patient had these symptoms:    Pharmacy name and phone #:    Best Call Back Number: [phone]  Additional Information: Patient called back to leave her cell number. She is waiting on a call from [facility]. Please call patient to advise.Thanks!

## 2022-12-15 ENCOUNTER — OFFICE VISIT (OUTPATIENT)
Dept: OTOLARYNGOLOGY | Facility: CLINIC | Age: 77
End: 2022-12-15
Payer: MEDICARE

## 2022-12-15 VITALS — BODY MASS INDEX: 28.37 KG/M2 | HEIGHT: 70 IN | WEIGHT: 198.19 LBS

## 2022-12-15 DIAGNOSIS — J01.01 ACUTE RECURRENT MAXILLARY SINUSITIS: Primary | ICD-10-CM

## 2022-12-15 DIAGNOSIS — J32.4 CHRONIC PANSINUSITIS: ICD-10-CM

## 2022-12-15 PROCEDURE — 99999 PR PBB SHADOW E&M-EST. PATIENT-LVL III: CPT | Mod: PBBFAC,,, | Performed by: STUDENT IN AN ORGANIZED HEALTH CARE EDUCATION/TRAINING PROGRAM

## 2022-12-15 PROCEDURE — 87075 CULTR BACTERIA EXCEPT BLOOD: CPT | Performed by: STUDENT IN AN ORGANIZED HEALTH CARE EDUCATION/TRAINING PROGRAM

## 2022-12-15 PROCEDURE — 87077 CULTURE AEROBIC IDENTIFY: CPT | Mod: 59 | Performed by: STUDENT IN AN ORGANIZED HEALTH CARE EDUCATION/TRAINING PROGRAM

## 2022-12-15 PROCEDURE — 31231 NASAL ENDOSCOPY DX: CPT | Mod: PBBFAC,PO | Performed by: STUDENT IN AN ORGANIZED HEALTH CARE EDUCATION/TRAINING PROGRAM

## 2022-12-15 PROCEDURE — 99213 PR OFFICE/OUTPT VISIT, EST, LEVL III, 20-29 MIN: ICD-10-PCS | Mod: 25,S$PBB,, | Performed by: STUDENT IN AN ORGANIZED HEALTH CARE EDUCATION/TRAINING PROGRAM

## 2022-12-15 PROCEDURE — 87186 SC STD MICRODIL/AGAR DIL: CPT | Mod: 59 | Performed by: STUDENT IN AN ORGANIZED HEALTH CARE EDUCATION/TRAINING PROGRAM

## 2022-12-15 PROCEDURE — 99999 PR PBB SHADOW E&M-EST. PATIENT-LVL III: ICD-10-PCS | Mod: PBBFAC,,, | Performed by: STUDENT IN AN ORGANIZED HEALTH CARE EDUCATION/TRAINING PROGRAM

## 2022-12-15 PROCEDURE — 99213 OFFICE O/P EST LOW 20 MIN: CPT | Mod: PBBFAC,PO,25 | Performed by: STUDENT IN AN ORGANIZED HEALTH CARE EDUCATION/TRAINING PROGRAM

## 2022-12-15 PROCEDURE — 87070 CULTURE OTHR SPECIMN AEROBIC: CPT | Performed by: STUDENT IN AN ORGANIZED HEALTH CARE EDUCATION/TRAINING PROGRAM

## 2022-12-15 PROCEDURE — 31231 NASAL ENDOSCOPY DX: CPT | Mod: S$PBB,,, | Performed by: STUDENT IN AN ORGANIZED HEALTH CARE EDUCATION/TRAINING PROGRAM

## 2022-12-15 PROCEDURE — 99213 OFFICE O/P EST LOW 20 MIN: CPT | Mod: 25,S$PBB,, | Performed by: STUDENT IN AN ORGANIZED HEALTH CARE EDUCATION/TRAINING PROGRAM

## 2022-12-15 PROCEDURE — 31231 PR NASAL ENDOSCOPY, DX: ICD-10-PCS | Mod: S$PBB,,, | Performed by: STUDENT IN AN ORGANIZED HEALTH CARE EDUCATION/TRAINING PROGRAM

## 2022-12-15 RX ORDER — MUPIROCIN 20 MG/G
OINTMENT TOPICAL DAILY
Qty: 30 G | Refills: 3 | Status: SHIPPED | OUTPATIENT
Start: 2022-12-15 | End: 2023-01-14

## 2022-12-15 RX ORDER — METHYLPREDNISOLONE 4 MG/1
TABLET ORAL
Qty: 21 EACH | Refills: 0 | Status: SHIPPED | OUTPATIENT
Start: 2022-12-15 | End: 2023-01-05

## 2022-12-15 NOTE — PROGRESS NOTES
Otolaryngology Clinic Note    Subjective:       Patient ID: Eligio Richardson is a 77 y.o. male.    Chief Complaint: Follow-up (CT results) and Headache      History of Present Illness: Eligio Richardsno is a 77 y.o. male presenting with sinusitis. Had issues since October. 4 rounds of abx since then. Got steroids as well. He saw Dr. Johnston, had a ct and was given cefdinir. (Had zpack, doxy, augmentin, and now cefdinir.) he is doing somewhat better. Still with headache front and back of head. Hurts in his temples a lot. He is not getting much drainage. He is doing sinus rinses with budesonide every 2-3 days.   Did not see allergist.     Saw Mary in past and was rx flonase and astelin. He had Rast panel at that time with mostly positive results.   Previous surgery: sinus sugrery 2011, balloon sinu plasty 2019. Did IT allergy testing in past for a year.     Ct 12/2/22: FINDINGS:  Mild prominence of the ventricles and sulci compatible with generalized cerebral volume loss.  No hydrocephalus.     Mild scattered hypoattenuation within the supratentorial white matter, nonspecific but probably reflecting sequelae of chronic microvascular ischemic change.   No definite parenchymal mass, hemorrhage, edema or acute major vascular distribution infarct.     No extra-axial blood or fluid collections.     Skull/extracranial contents (limited evaluation):     No displaced calvarial fracture.     Complete opacification of the visualized left maxillary sinus.  Near complete opacification of the left sphenoid sinus.  Scattered mild mucosal membrane thickening within the left greater than right ethmoid sinuses.     Bilateral lens replacements.     Impression:     1. No evidence of acute intracranial abnormality.  2. Paranasal sinus disease, with opacification of the left maxillary and sphenoid sinuses.      Past Surgical History:   Procedure Laterality Date    ANGIOGRAM, CORONARY, WITH LEFT HEART CATHETERIZATION N/A 10/18/2022     Procedure: Angiogram, Coronary, with Left Heart Cath;  Surgeon: Joaquin Freedman MD;  Location: [facility] CATH;  Service: Cardiology;  Laterality: N/A;    CHOLECYSTECTOMY      COLONOSCOPY  2013    Dr. Gee    COLONOSCOPY N/A 06/06/2016    Procedure: COLONOSCOPY;  Surgeon: Ricky Arriola MD;  Location: [facility] ENDO;  Service: Endoscopy;  Laterality: N/A;    COLONOSCOPY  07/15/2021    Dr. Hawk    CYSTOSCOPY N/A 06/18/2018    Procedure: CYSTOSCOPY;  Surgeon: Andry Paz MD;  Location: [facility] OR;  Service: Urology;  Laterality: N/A;    CYSTOURETHROSCOPY  10/2019    EGD, WITH BALLOON DILATION N/A 09/21/2021    ELBOW ARTHROPLASTY      EYE SURGERY Bilateral     cataract    FRACTURE SURGERY Right     knee     HIATAL HERNIA REPAIR  2013    [facility]    KNEE ARTHROSCOPY Right     neck injection  10/2019    NECK SURGERY      prostate biospy      normal per pt    SHOULDER SURGERY Bilateral     SINUS SURGERY  01/2019    [facility] [place]     TONSILLECTOMY      TRANSRECTAL BIOPSY OF PROSTATE WITH ULTRASOUND GUIDANCE N/A 06/18/2018    Procedure: BIOPSY, PROSTATE, TRANSRECTAL APPROACH, WITH US GUIDANCE;  Surgeon: Andry Paz MD;  Location: [facility] OR;  Service: Urology;  Laterality: N/A;    UPPER GASTROINTESTINAL ENDOSCOPY  ~2013     Past Medical History:   Diagnosis Date    Anticoagulant long-term use     Cancer     skin cancer to face    Cholelithiasis     Constipation, chronic     severe    Degenerative disc disease, cervical     Gallstones     GERD (gastroesophageal reflux disease)     Hiatal hernia     Hypothyroidism     Inguinal hernia without mention of obstruction or gangrene, unilateral or unspecified, (not specified as recurrent)     Insomnia     Irregular heart beat     currently not an issue and not on any meds for it    Osteoarthritis     Sleep apnea      Social Determinants of Health     Tobacco Use: Low Risk     Smoking Tobacco Use: Never    Smokeless Tobacco Use: Never    Passive Exposure: Not on file   Alcohol Use:  Not on file   Financial Resource Strain: Not on file   Food Insecurity: Not on file   Transportation Needs: Not on file   Physical Activity: Not on file   Stress: Not on file   Social Connections: Not on file   Housing Stability: Not on file   Depression: Low Risk     Last PHQ Score: 0     Review of patient's allergies indicates:   Allergen Reactions    Iodinated contrast media Rash    Myrbetriq [mirabegron] Swelling     Chest pain,nausea, abdominal swelling, decrease erection     Current Outpatient Medications   Medication Instructions    acebutoloL (SECTRAL) 400 mg, Oral, 2 times daily    aluminum hydrox-magnesium carb 254-237.5 mg/5 mL Susp 10 mLs, Oral, Daily PRN    clopidogreL (PLAVIX) 75 mg, Oral, Daily    clopidogreL (PLAVIX) 75 mg, Oral, Daily    doxycycline (MONODOX) 100 mg, Oral, 2 times daily    finasteride (PROSCAR) 5 mg, Oral, Daily    fluorouraciL (EFUDEX) 5 % cream Topical (Top), 2 times daily, For 2 weeks.    fluticasone propionate (FLONASE) 100 mcg, Each Nostril, 2 times daily    hydroCHLOROthiazide (HYDRODIURIL) 25 mg, Oral, Daily    HYDROcodone-acetaminophen (NORCO) 7.5-325 mg per tablet 1 tablet, Oral, Every 6 hours PRN    L gasseri/B bifidum/B longum (PROBIOTIC COLON CARE ORAL) 1 tablet, Oral, Daily    levothyroxine (SYNTHROID) 88 mcg, Oral, Daily    meloxicam (MOBIC) 15 mg, Oral, Daily    olmesartan (BENICAR) 5 mg, Oral, Nightly    oxybutynin (DITROPAN-XL) 10 mg, Oral, Daily    pantoprazole (PROTONIX) 40 MG tablet Take 1 tablet twice a day by oral route in the morning for 90 days.    PROPYLENE GLYCOL (SYSTANE BALANCE OPHT) 1 drop, Both Eyes, Daily    rosuvastatin (CRESTOR) 10 mg, Oral, Nightly    tamsulosin (FLOMAX) 0.4 mg, Oral, 2 times daily       ENT ROS negative except as stated above.             Objective:      There were no vitals filed for this visit.    General: NAD, well appearing  Eyes: Normal conjunctiva and lids  Face: symmetric, nerve intact  Nose: The nose is without any evidence  of any deformity. The nasal mucosa is moist. The septum is midline. There is no evidence of septal hematoma. The turbinates are without abnormality.   Ears: The ears are with normal-appearing pinna. Examination of the canals is normal appearing bilaterally. There is no drainage or erythema noted. The tympanic membranes are normal appearing with pearly color, normal-appearing landmarks and normal light reflex. Hearing is grossly intact.  Mouth: No obvious abnormalities to the lips. The teeth are unremarkable. The gingivae are without any obvious evidence of infection or lesion. The oral mucosa is moist and pink. There are no obvious masses to the hard or soft palate.   Oropharynx: The uvula is midline.  The tongue is midline. The posterior pharynx is without erythema or exudate. The tonsils are normal appearing.  Salivary glands: The salivary glands are symmetric and not enlarged, no masses  Neck: No lymphadenopathy, trachea midline, thryoid not enlarged.  Psych: Normal mood and affect.   Neuro: Grossly intact  Speech: fluent    Procedure: Nasal Endoscopy  Indications: chronic sinusitis   Verbal consent obtained  Anesthesia: topical lidocaine and phenylephrine spray    Procedure: Rigid 30 degree endoscope was passed into each nare to nasopharynx showing findings below.     Septum is midline. Nasal mucosa inflamed. Turbinates are large size and respond to decongestant appropriately. Right maxillary sinus and sphenoethmoidal recess without purulence or polyps. Left maxillary sinus with mucopurulence coming from os and sphenoethmoidal recess without purulence or polyps but is boggy and purple in this area. No adenoid hypertrophy. Culture obtained       Assessment and Plan:       1. Acute recurrent maxillary sinusitis    2. Chronic pansinusitis        Has only had steroids once, will do steroid again, complete cefdinir. no DM.    Will see what culture results are and can give new abx if indicated    Budesonide rinses  daily, add mupirocin to rinses until follow up    Will get repeat CT scan with [facility] to assess. Has appt with Mary 1/17, can leave and add CT to that day.     He just had stent put in 6 weeks ago and is on clopidogrel now. No option for surgery in acute setting, discussed that this may  be indicated if sinuses do not look better but will have to medically manage for now.     RTC: 1/17 with CT    Plan of care was discussed in detail with the patient, who agreed with the plan as above. All questions were answered in detail.     Verónica Wiggins MD  Otolaryngology

## 2022-12-19 LAB
BACTERIA SPEC AEROBE CULT: ABNORMAL
BACTERIA SPEC AEROBE CULT: ABNORMAL

## 2022-12-19 RX ORDER — CIPROFLOXACIN 500 MG/1
500 TABLET ORAL 2 TIMES DAILY
Qty: 20 TABLET | Refills: 0 | Status: SHIPPED | OUTPATIENT
Start: 2022-12-19 | End: 2022-12-29

## 2022-12-20 LAB — BACTERIA SPEC ANAEROBE CULT: NORMAL

## 2022-12-28 RX ORDER — MONTELUKAST SODIUM 10 MG/1
10 TABLET ORAL
COMMUNITY
Start: 2022-12-27 | End: 2022-12-28 | Stop reason: SDUPTHER

## 2022-12-28 RX ORDER — OLMESARTAN MEDOXOMIL 5 MG/1
5 TABLET ORAL NIGHTLY
Qty: 90 TABLET | Refills: 0 | Status: SHIPPED | OUTPATIENT
Start: 2022-12-28 | End: 2023-07-26

## 2022-12-28 NOTE — TELEPHONE ENCOUNTER
No new care gaps identified.  [facility] Embedded Care Gaps. Reference number: [id number]. 12/28/2022   12:28:37 PM CST

## 2022-12-29 NOTE — TELEPHONE ENCOUNTER
Refill Routing Note   Medication(s) are not appropriate for processing by [facility] for the following reason(s):      - Medication not active on medication list    [facility] action(s):  Defer       Medication Therapy Plan: Prescription ; defer to PCP  Medication reconciliation completed: No     Appointments  past 12m or future 3m with PCP    Date Provider   Last Visit   2022 Acosta Corley MD   Next Visit   3/30/2023 Acosta Corley MD   ED visits in past 90 days: 0        Note composed:8:58 PM 2022

## 2022-12-30 RX ORDER — MONTELUKAST SODIUM 10 MG/1
10 TABLET ORAL DAILY
Qty: 90 TABLET | Refills: 1 | Status: SHIPPED | OUTPATIENT
Start: 2022-12-30

## 2023-01-03 ENCOUNTER — HOSPITAL ENCOUNTER (OUTPATIENT)
Dept: RADIOLOGY | Facility: HOSPITAL | Age: 78
Discharge: HOME | End: 2023-01-03
Attending: STUDENT IN AN ORGANIZED HEALTH CARE EDUCATION/TRAINING PROGRAM
Payer: MEDICARE

## 2023-01-03 DIAGNOSIS — J32.4 CHRONIC PANSINUSITIS: ICD-10-CM

## 2023-01-03 PROCEDURE — 70486 CT MEDTRONIC SINUSES WITHOUT: ICD-10-PCS | Mod: 26,,, | Performed by: RADIOLOGY

## 2023-01-03 PROCEDURE — 70486 CT MAXILLOFACIAL W/O DYE: CPT | Mod: TC,PO

## 2023-01-03 PROCEDURE — 70486 CT MAXILLOFACIAL W/O DYE: CPT | Mod: 26,,, | Performed by: RADIOLOGY

## 2023-01-17 ENCOUNTER — OFFICE VISIT (OUTPATIENT)
Dept: OTOLARYNGOLOGY | Facility: CLINIC | Age: 78
End: 2023-01-17
Payer: MEDICARE

## 2023-01-17 VITALS — BODY MASS INDEX: 28.62 KG/M2 | HEIGHT: 70 IN | WEIGHT: 199.94 LBS

## 2023-01-17 DIAGNOSIS — J32.3 CHRONIC SPHENOIDAL SINUSITIS: Primary | ICD-10-CM

## 2023-01-17 DIAGNOSIS — J32.0 CHRONIC MAXILLARY SINUSITIS: ICD-10-CM

## 2023-01-17 PROCEDURE — 99999 PR PBB SHADOW E&M-EST. PATIENT-LVL III: CPT | Mod: PBBFAC,,, | Performed by: OTOLARYNGOLOGY

## 2023-01-17 PROCEDURE — 99999 PR PBB SHADOW E&M-EST. PATIENT-LVL III: ICD-10-PCS | Mod: PBBFAC,,, | Performed by: OTOLARYNGOLOGY

## 2023-01-17 PROCEDURE — 99214 OFFICE O/P EST MOD 30 MIN: CPT | Mod: S$PBB,,, | Performed by: OTOLARYNGOLOGY

## 2023-01-17 PROCEDURE — 99213 OFFICE O/P EST LOW 20 MIN: CPT | Mod: PBBFAC,PO | Performed by: OTOLARYNGOLOGY

## 2023-01-17 PROCEDURE — 99214 PR OFFICE/OUTPT VISIT, EST, LEVL IV, 30-39 MIN: ICD-10-PCS | Mod: S$PBB,,, | Performed by: OTOLARYNGOLOGY

## 2023-01-17 NOTE — PROGRESS NOTES
"  Subjective:       Patient ID: Eligio Richardson is a 78 y.o. male.    Chief Complaint: Sinusitis    Eligio is here for follow-up of rhinitis and sinusitis.   His sinus issues recurred over the past 4 months, noted below by [facility] note.  He felt some improvement over the past few weeks but today feels like the headache is coming back. Occurs bilateral temples and around eye.  Culture taken from L nasal cavity + for multiple bacteria.  Had CT as noted below.    12/2021 ([facility])   Had issues since October. 4 rounds of abx since then. Got steroids as well. He saw Dr. Johnston, had a ct and was given cefdinir. (Had zpack, doxy, augmentin, and now cefdinir.) he is doing somewhat better. Still with headache front and back of head. Hurts in his temples a lot. He is not getting much drainage. He is doing sinus rinses with budesonide every 2-3 days.   Did not see allergist.     Interval: 4/2021:  Last visit, culture obtained and abx sent. Added astelin and using saline.     He felt better after the antibiotic I gave him, but he feels like it is "beginning to come back." He feels that his usual course is feeling OK but then having exacerbations where he feels congestion and pressure.   He is using Flonase and Astelin, and stopped Zyrtec. No recent saline    Review of Systems   Constitutional: Negative for activity change and appetite change.   Respiratory: Negative for difficulty breathing and wheezing   Cardiovascular: Negative for chest pain.      Objective:        Constitutional:   Vital signs are normal. He appears well-developed and well-nourished.     Head:  Normocephalic and atraumatic.     Ears:  Hearing normal to normal and whispered voice; external ear normal without scars, lesions, or masses; ear canal, tympanic membrane, and middle ear normal..     Nose:  Nose normal including turbinates, nasal mucosa, sinuses and nasal septum. Mucosal edema (rhinitis changes) present.     Mouth/Throat  Oropharynx clear and moist " without lesions or asymmetry.     Neck:  Neck normal without thyromegaly masses, asymmetry, normal tracheal structure, crepitus, and tenderness.       Tests / Results:  CT 1/2022 personally reviewed:  Opacification of L sphenoid, mucosal thickening of bilateral maxillary sinus    Name: Eligio Richardson     Pre-procedure diagnosis: Chronic sphenoidal sinusitis [J32.3]  Post-procedure diagnosis: Same    Procedure: Bilateral nasal endoscopy  Anesthesia:  4% Lidocaine and 0.25% Phenylephrine Topical    Indication: This procedure is indicated as anterior rhinoscopy is not sufficient to account for all of the patients symptoms.     Procedure: Risks, benefits, and alternatives of the procedure were discussed with the patient, and consent was obtained to perform a nasal endoscopy.  The nasal cavity was sprayed with a topical decongestant and topical anesthetic. After adequate anesthesia was obtained, the scope was passed into each nostril independently.  The nasal cavities (including inferior turbinates, middle turbinates, inferior meatus, middle meatus, superior meatus) nasopharynx, choana, eustachian tube, fossa of Rosenmüller, and adenoids were examined. All findings were normal with exception of description below. At the end of the examination, the scope was removed. The patient tolerated the procedure well with no complications.     LEFT: mild edema of SE recess. No purulence in middle meatus or SE recess.  RIGHT: no edema or purulence    Assessment:       1. Chronic sphenoidal sinusitis    2. Chronic maxillary sinusitis            Plan:       We discussed CRS.  No infection today. No further antibiotics.  We discussed that given recalcitrant issues with need for multiple rounds of abx to clear, can consider limited FESS (Cameron Max, L Sphenoid, poss L total ethmoid.) He has had balloon sinuplasty in the past so this is not a good option.  He will monitor the improvement of the current infection and will consider surgery  pending course.

## 2023-01-18 ENCOUNTER — PATIENT MESSAGE (OUTPATIENT)
Dept: CARDIOLOGY | Facility: CLINIC | Age: 78
End: 2023-01-18
Payer: MEDICARE

## 2023-01-27 ENCOUNTER — PATIENT MESSAGE (OUTPATIENT)
Dept: CARDIOLOGY | Facility: CLINIC | Age: 78
End: 2023-01-27
Payer: MEDICARE

## 2023-02-01 ENCOUNTER — TELEPHONE (OUTPATIENT)
Dept: CARDIOLOGY | Facility: CLINIC | Age: 78
End: 2023-02-01
Payer: MEDICARE

## 2023-02-01 NOTE — TELEPHONE ENCOUNTER
[facility] requesting clearance for Cervical ES1 with Dr. Chavez. Can he be cleared to be off of PLAVIX for 7 days prior to procedure.

## 2023-03-02 ENCOUNTER — LAB VISIT (OUTPATIENT)
Dept: LAB | Facility: CLINIC | Age: 78
End: 2023-03-02
Payer: MEDICARE

## 2023-03-02 DIAGNOSIS — I25.10 CORONARY ARTERY DISEASE INVOLVING NATIVE CORONARY ARTERY OF NATIVE HEART WITHOUT ANGINA PECTORIS: Chronic | ICD-10-CM

## 2023-03-02 DIAGNOSIS — I10 PRIMARY HYPERTENSION: Chronic | ICD-10-CM

## 2023-03-02 DIAGNOSIS — E78.00 HYPERCHOLESTEROLEMIA: Chronic | ICD-10-CM

## 2023-03-02 DIAGNOSIS — Z79.02 LONG TERM (CURRENT) USE OF ANTITHROMBOTICS/ANTIPLATELETS: Chronic | ICD-10-CM

## 2023-03-02 LAB
ALBUMIN SERPL BCP-MCNC: 4 G/DL (ref 3.5–5.2)
ALP SERPL-CCNC: 58 U/L (ref 55–135)
ALT SERPL W/O P-5'-P-CCNC: 34 U/L (ref 10–44)
ANION GAP SERPL CALC-SCNC: 6 MMOL/L (ref 8–16)
AST SERPL-CCNC: 24 U/L (ref 10–40)
BILIRUB SERPL-MCNC: 1.2 MG/DL (ref 0.1–1)
BUN SERPL-MCNC: 23 MG/DL (ref 8–23)
CALCIUM SERPL-MCNC: 10 MG/DL (ref 8.7–10.5)
CHLORIDE SERPL-SCNC: 107 MMOL/L (ref 95–110)
CHOLEST SERPL-MCNC: 117 MG/DL (ref 120–199)
CHOLEST/HDLC SERPL: 2.1 {RATIO} (ref 2–5)
CO2 SERPL-SCNC: 25 MMOL/L (ref 23–29)
CREAT SERPL-MCNC: 1.4 MG/DL (ref 0.5–1.4)
EST. GFR  (NO RACE VARIABLE): 51.1 ML/MIN/1.73 M^2
GLUCOSE SERPL-MCNC: 96 MG/DL (ref 70–110)
HDLC SERPL-MCNC: 56 MG/DL (ref 40–75)
HDLC SERPL: 47.9 % (ref 20–50)
HGB BLD-MCNC: 13.9 G/DL (ref 14–18)
LDLC SERPL CALC-MCNC: 47.4 MG/DL (ref 63–159)
NONHDLC SERPL-MCNC: 61 MG/DL
POTASSIUM SERPL-SCNC: 4.4 MMOL/L (ref 3.5–5.1)
PROT SERPL-MCNC: 6.8 G/DL (ref 6–8.4)
SODIUM SERPL-SCNC: 138 MMOL/L (ref 136–145)
TRIGL SERPL-MCNC: 68 MG/DL (ref 30–150)

## 2023-03-02 PROCEDURE — 85018 HEMOGLOBIN: CPT | Performed by: INTERNAL MEDICINE

## 2023-03-02 PROCEDURE — 36415 COLL VENOUS BLD VENIPUNCTURE: CPT | Mod: ,,, | Performed by: STUDENT IN AN ORGANIZED HEALTH CARE EDUCATION/TRAINING PROGRAM

## 2023-03-02 PROCEDURE — 36415 PR COLLECTION VENOUS BLOOD,VENIPUNCTURE: ICD-10-PCS | Mod: ,,, | Performed by: STUDENT IN AN ORGANIZED HEALTH CARE EDUCATION/TRAINING PROGRAM

## 2023-03-02 PROCEDURE — 80061 LIPID PANEL: CPT | Performed by: INTERNAL MEDICINE

## 2023-03-02 PROCEDURE — 80053 COMPREHEN METABOLIC PANEL: CPT | Performed by: INTERNAL MEDICINE

## 2023-03-08 ENCOUNTER — OFFICE VISIT (OUTPATIENT)
Dept: CARDIOLOGY | Facility: CLINIC | Age: 78
End: 2023-03-08
Payer: MEDICARE

## 2023-03-08 VITALS
WEIGHT: 200.81 LBS | DIASTOLIC BLOOD PRESSURE: 74 MMHG | HEIGHT: 70 IN | HEART RATE: 57 BPM | BODY MASS INDEX: 28.75 KG/M2 | SYSTOLIC BLOOD PRESSURE: 138 MMHG

## 2023-03-08 DIAGNOSIS — R09.89 LABILE HYPERTENSION: Chronic | ICD-10-CM

## 2023-03-08 DIAGNOSIS — N18.31 STAGE 3A CHRONIC KIDNEY DISEASE: Chronic | ICD-10-CM

## 2023-03-08 DIAGNOSIS — E03.9 ACQUIRED HYPOTHYROIDISM: Chronic | ICD-10-CM

## 2023-03-08 DIAGNOSIS — Z79.02 LONG TERM (CURRENT) USE OF ANTITHROMBOTICS/ANTIPLATELETS: Chronic | ICD-10-CM

## 2023-03-08 DIAGNOSIS — I08.0 MITRAL REGURGITATION AND AORTIC STENOSIS: ICD-10-CM

## 2023-03-08 DIAGNOSIS — J32.0 CHRONIC MAXILLARY SINUSITIS: Primary | ICD-10-CM

## 2023-03-08 DIAGNOSIS — I25.10 CORONARY ARTERY DISEASE INVOLVING NATIVE CORONARY ARTERY OF NATIVE HEART WITHOUT ANGINA PECTORIS: Primary | Chronic | ICD-10-CM

## 2023-03-08 PROCEDURE — 99214 OFFICE O/P EST MOD 30 MIN: CPT | Mod: S$GLB,,, | Performed by: INTERNAL MEDICINE

## 2023-03-08 PROCEDURE — 99214 PR OFFICE/OUTPT VISIT, EST, LEVL IV, 30-39 MIN: ICD-10-PCS | Mod: S$GLB,,, | Performed by: INTERNAL MEDICINE

## 2023-03-08 RX ORDER — CARVEDILOL 6.25 MG/1
6.25 TABLET ORAL 2 TIMES DAILY WITH MEALS
Qty: 180 TABLET | Refills: 0 | Status: SHIPPED | OUTPATIENT
Start: 2023-03-08 | End: 2023-06-12 | Stop reason: SDUPTHER

## 2023-03-08 RX ORDER — MUPIROCIN 20 MG/G
OINTMENT TOPICAL DAILY
Qty: 22 G | Refills: 2 | Status: SHIPPED | OUTPATIENT
Start: 2023-03-08

## 2023-03-08 RX ORDER — MUPIROCIN 20 MG/G
OINTMENT TOPICAL
COMMUNITY
Start: 2023-02-15 | End: 2023-03-08 | Stop reason: SDUPTHER

## 2023-03-08 NOTE — PROGRESS NOTES
Subjective:    Patient ID:  Eligio Richardson is a 78 y.o. male who presents for Coronary Artery Disease, Risk Factor Management For Atherosclerosis, and Heart Problem        HPI  DISCUSSED LABS AND GOALS CMP WITH GFR OF 51, LDL 47, HDL 56, TRIGLYCERIDE 68, HEMOGLOBIN 13.9, BP FLUCTUATES LOW IN PM,OR AT NOON, FEELS BAD WITH IT, ??? ALWAYS AROUND LUNCH TIME, LAYS DOWN AND FEELS BETTER,  REMAINS TIRED, WORRIES ABOUT BLOOD PRESSURE, SEE ROS    Past Medical History:   Diagnosis Date    Anticoagulant long-term use     Cancer     skin cancer to face    Cholelithiasis     Constipation, chronic     severe    Degenerative disc disease, cervical     Gallstones     GERD (gastroesophageal reflux disease)     Hiatal hernia     Hypothyroidism     Inguinal hernia without mention of obstruction or gangrene, unilateral or unspecified, (not specified as recurrent)     Insomnia     Irregular heart beat     currently not an issue and not on any meds for it    Osteoarthritis     Sleep apnea      Past Surgical History:   Procedure Laterality Date    ANGIOGRAM, CORONARY, WITH LEFT HEART CATHETERIZATION N/A 10/18/2022    Procedure: Angiogram, Coronary, with Left Heart Cath;  Surgeon: Joaquin Freedman MD;  Location: [facility] CATH;  Service: Cardiology;  Laterality: N/A;    CHOLECYSTECTOMY      COLONOSCOPY  2013    Dr. Gee    COLONOSCOPY N/A 06/06/2016    Procedure: COLONOSCOPY;  Surgeon: Ricky Arriola MD;  Location: [facility] ENDO;  Service: Endoscopy;  Laterality: N/A;    COLONOSCOPY  07/15/2021    Dr. Hawk    CYSTOSCOPY N/A 06/18/2018    Procedure: CYSTOSCOPY;  Surgeon: Andry Paz MD;  Location: [facility] OR;  Service: Urology;  Laterality: N/A;    CYSTOURETHROSCOPY  10/2019    EGD, WITH BALLOON DILATION N/A 09/21/2021    ELBOW ARTHROPLASTY      EYE SURGERY Bilateral     cataract    FRACTURE SURGERY Right     knee     HIATAL HERNIA REPAIR  2013    OF    KNEE ARTHROSCOPY Right     neck injection  10/2019    NECK SURGERY      prostate  "biospy      normal per pt    SHOULDER SURGERY Bilateral     SINUS SURGERY  01/2019    Mirna Gill    TONSILLECTOMY      TRANSRECTAL BIOPSY OF PROSTATE WITH ULTRASOUND GUIDANCE N/A 06/18/2018    Procedure: BIOPSY, PROSTATE, TRANSRECTAL APPROACH, WITH US GUIDANCE;  Surgeon: Andry Paz MD;  Location: [facility] OR;  Service: Urology;  Laterality: N/A;    UPPER GASTROINTESTINAL ENDOSCOPY  ~2013     Family History   Problem Relation Age of Onset    Lung cancer Mother     Skin cancer Father     Lung cancer Father     Prostate cancer Maternal Uncle     Prostate cancer Paternal Uncle     Colon cancer Neg Hx     Colon polyps Neg Hx     Esophageal cancer Neg Hx     Stomach cancer Neg Hx     Liver cancer Neg Hx     Crohn's disease Neg Hx     Inflammatory bowel disease Neg Hx     Irritable bowel syndrome Neg Hx      Social History     Socioeconomic History    Marital status:    Occupational History    Occupation: retired, worked at [employer]     Comment: [place]    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 , Disp: , Rfl:     clopidogreL (PLAVIX) 75 mg tablet, Take 1 tablet (75 mg total) by mouth once daily., Disp: 30 tablet, Rfl: 11    clopidogreL (PLAVIX) 75 mg tablet, Take 1 tablet (75 mg total) by mouth once daily., Disp: 30 tablet, Rfl: 11    finasteride (PROSCAR) 5 mg tablet, Take 5 mg by mouth once daily., Disp: , Rfl:     fluorouraciL (EFUDEX) 5 % cream, Apply topically 2 " (two) times daily. For 2 weeks., Disp: 40 g, Rfl: 1    hydroCHLOROthiazide (HYDRODIURIL) 12.5 MG Tab, Take 25 mg by mouth once daily., Disp: , Rfl:     HYDROcodone-acetaminophen (NORCO) 7.5-325 mg per tablet, Take 1 tablet by mouth every 6 (six) hours as needed., Disp: , Rfl:     L gasseri/B bifidum/B longum (PROBIOTIC COLON CARE ORAL), Take 1 tablet by mouth once daily., Disp: , Rfl:     levothyroxine (SYNTHROID) 88 MCG tablet, Take 1 tablet (88 mcg total) by mouth once daily., Disp: 90 tablet, Rfl: 3    meloxicam (MOBIC) 15 MG tablet, Take 1 tablet (15 mg total) by mouth once daily., Disp: 90 tablet, Rfl: 3    montelukast (SINGULAIR) 10 mg tablet, Take 1 tablet (10 mg total) by mouth once daily., Disp: 90 tablet, Rfl: 1    olmesartan (BENICAR) 5 MG Tab, Take 1 tablet (5 mg total) by mouth every evening. (Patient taking differently: Take 5 mg by mouth every evening. Half of pill), Disp: 90 tablet, Rfl: 0    oxybutynin (DITROPAN-XL) 10 MG 24 hr tablet, Take 1 tablet (10 mg total) by mouth once daily., Disp: 90 tablet, Rfl: 3    pantoprazole (PROTONIX) 40 MG tablet, Take 1 tablet twice a day by oral route in the morning for 90 days., Disp: , Rfl:     rosuvastatin (CRESTOR) 10 MG tablet, Take 1 tablet (10 mg total) by mouth every evening., Disp: 90 tablet, Rfl: 1    tamsulosin (FLOMAX) 0.4 mg Cap, Take 1 capsule (0.4 mg total) by mouth 2 (two) times a day., Disp: 180 capsule, Rfl: 3    carvediloL (COREG) 6.25 MG tablet, Take 1 tablet (6.25 mg total) by mouth 2 (two) times daily with meals., Disp: 180 tablet, Rfl: 0    doxycycline (MONODOX) 100 MG capsule, Take 1 capsule (100 mg total) by mouth 2 (two) times daily., Disp: 14 capsule, Rfl: 0    mupirocin (BACTROBAN) 2 % ointment, by Nasal route once daily., Disp: 22 g, Rfl: 2    Review of Systems   Constitutional: Negative for chills, diaphoresis, fever, malaise/fatigue, night sweats and weight loss.   HENT:  Negative for congestion and nosebleeds.    Eyes:  Negative  "for blurred vision and redness.   Cardiovascular:  Negative for chest pain, claudication, cyanosis, dyspnea on exertion (MILD), irregular heartbeat, leg swelling, near-syncope, orthopnea, palpitations, paroxysmal nocturnal dyspnea and syncope.   Respiratory:  Negative for cough (OCC), hemoptysis, shortness of breath and wheezing.    Endocrine: Negative for cold intolerance and heat intolerance.   Hematologic/Lymphatic: Negative for adenopathy. Bruises/bleeds easily.        LEUKEMIA, PROSTATE CANCER   Skin:  Negative for color change and itching.   Musculoskeletal:  Positive for arthritis. Negative for back pain (MILD,CHRONIC, HYDROCODONE) and falls. Muscle weakness: R FOOT.  Gastrointestinal:  Negative for abdominal pain, change in bowel habit, constipation, dysphagia, jaundice, melena and nausea.   Genitourinary:  Negative for dysuria and flank pain.   Neurological:  Negative for brief paralysis, dizziness, focal weakness, light-headedness, loss of balance, numbness and weakness.   Psychiatric/Behavioral:  Negative for altered mental status and depression.    Allergic/Immunologic: Negative for hives and persistent infections.      Objective:      Vitals:    03/08/23 1132 03/08/23 1139   BP: 136/72 138/74   Pulse: (!) 57    Weight: 91.1 kg (200 lb 13.4 oz)    Height: 5' 10" (1.778 m)    PainSc: 0-No pain      Body mass index is 28.82 kg/m².    Physical Exam  Constitutional:       Appearance: Normal appearance.   HENT:      Head: Normocephalic and atraumatic.   Eyes:      Extraocular Movements: Extraocular movements intact.      Pupils: Pupils are equal, round, and reactive to light.   Cardiovascular:      Rate and Rhythm: Regular rhythm. Bradycardia present.      Pulses: Normal pulses.           Carotid pulses are 2+ on the right side and 2+ on the left side.       Radial pulses are 2+ on the right side and 2+ on the left side.        Posterior tibial pulses are 2+ on the right side and 2+ on the left side.      " Heart sounds: Murmur heard.   Systolic murmur is present with a grade of 2/6 at the upper right sternal border.     No friction rub. No gallop.   Pulmonary:      Effort: Pulmonary effort is normal.      Breath sounds: Normal breath sounds and air entry. No rales.   Abdominal:      Palpations: Abdomen is soft.      Tenderness: There is no abdominal tenderness.   Musculoskeletal:      Cervical back: Neck supple.      Right lower leg: No edema.      Left lower leg: No edema.   Skin:     General: Skin is warm and dry.      Capillary Refill: Capillary refill takes less than 2 seconds.   Neurological:      General: No focal deficit present.      Mental Status: He is alert and oriented to person, place, and time.      Cranial Nerves: Cranial nerve deficit ([place]) present.   Psychiatric:         Mood and Affect: Mood normal.         Speech: Speech normal.         Behavior: Behavior normal.               ..    Chemistry        Component Value Date/Time     03/02/2023 1049    K 4.4 03/02/2023 1049     03/02/2023 1049    CO2 25 03/02/2023 1049    BUN 23 03/02/2023 1049    CREATININE 1.4 03/02/2023 1049    GLU 96 03/02/2023 1049        Component Value Date/Time    CALCIUM 10.0 03/02/2023 1049    ALKPHOS 58 03/02/2023 1049    AST 24 03/02/2023 1049    ALT 34 03/02/2023 1049    BILITOT 1.2 (H) 03/02/2023 1049    ESTGFRAFRICA >60 12/07/2020 0905    EGFRNONAA >60 12/07/2020 0905            ..  Lab Results   Component Value Date    CHOL 117 (L) 03/02/2023    CHOL 154 12/07/2020    CHOL 149 10/19/2018     Lab Results   Component Value Date    HDL 56 03/02/2023    HDL 51 12/07/2020    HDL 52 10/19/2018     Lab Results   Component Value Date    LDLCALC 47.4 (L) 03/02/2023    LDLCALC 88.8 12/07/2020    LDLCALC 83.8 10/19/2018     Lab Results   Component Value Date    TRIG 68 03/02/2023    TRIG 71 12/07/2020    TRIG 66 10/19/2018     Lab Results   Component Value Date    CHOLHDL 47.9 03/02/2023    CHOLHDL 33.1 12/07/2020     CHOLHDL 34.9 10/19/2018     ..  Lab Results   Component Value Date    WBC 8.67 10/05/2022    HGB 13.9 (L) 03/02/2023    HCT 43.0 10/05/2022     (H) 10/05/2022     10/05/2022       Test(s) Reviewed  I have reviewed the following in detail:  [] Stress test   [] Angiography   [] Echocardiogram   [x] Labs   [] Other:       Assessment:         ICD-10-CM ICD-9-CM   1. Coronary artery disease involving native coronary artery of native heart without angina pectoris  I25.10 414.01   2. Mitral regurgitation and aortic stenosis  I08.0 396.2   3. Labile hypertension  R09.89 401.9   4. Stage 3a chronic kidney disease  N18.31 585.3   5. Long term (current) use of antithrombotics/antiplatelets  Z79.02 V58.63   6. Acquired hypothyroidism  E03.9 244.9     Problem List Items Addressed This Visit          Cardiac/Vascular    Mitral regurgitation and aortic stenosis    Coronary artery disease involving native coronary artery of native heart without angina pectoris - Primary    Labile hypertension       Renal/    Stage 3a chronic kidney disease       Hematology    Long term (current) use of antithrombotics/antiplatelets       Endocrine    Hypothyroidism    Relevant Orders    TSH        Plan:     CHECKED BP IN OFFICE, WRONG WAY WRIST WAS NOT DONE RIGHT AND, MACHINE WAS ALSO 20 POINTS OFF, SX ? WITH BG POSSIBLE HYPOGLYCEMIA , SMALL FREQUENT MEALS, CHANGE SECTRAL TO CARVEDILOL 6.25  BID, DECREASE ASA TO QOD, NO CLEAR ANGINA NO OVERT HEART FAILURE NO TIA TYPE SYMPTOM DIET EXERCISE INCREASE ACTIVITY HYDRATION DISCUSSED PLAN WITH THE PATIENT AND HIS WIFE, RETURN TO CLINIC IN 3-4 MO, INCREASED CV RISK WITH CKD      Coronary artery disease involving native coronary artery of native heart without angina pectoris    Mitral regurgitation and aortic stenosis    Labile hypertension    Stage 3a chronic kidney disease    Long term (current) use of antithrombotics/antiplatelets    Acquired hypothyroidism  -     TSH; Future; Expected  date: 06/08/2023    Other orders  -     carvediloL (COREG) 6.25 MG tablet; Take 1 tablet (6.25 mg total) by mouth 2 (two) times daily with meals.  Dispense: 180 tablet; Refill: 0    RTC Low level/low impact aerobic exercise 5x's/wk. Heart healthy diet and risk factor modification.    See labs and med orders.    Aerobic exercise 5x's/wk. Heart healthy diet and risk factor modification.    See labs and med orders.

## 2023-03-09 ENCOUNTER — CLINICAL SUPPORT (OUTPATIENT)
Dept: AUDIOLOGY | Facility: CLINIC | Age: 78
End: 2023-03-09
Payer: MEDICARE

## 2023-03-09 DIAGNOSIS — Z97.4 WEARS HEARING AID IN BOTH EARS: Primary | ICD-10-CM

## 2023-03-09 PROCEDURE — 99499 NO LOS: ICD-10-PCS | Mod: S$PBB,,, | Performed by: AUDIOLOGIST-HEARING AID FITTER

## 2023-03-09 PROCEDURE — 99499 UNLISTED E&M SERVICE: CPT | Mod: S$PBB,,, | Performed by: AUDIOLOGIST-HEARING AID FITTER

## 2023-03-09 NOTE — PROGRESS NOTES
Eligio Richardson came in on 03/09/2023 for a hearing aid follow up. Pt stated the left down button is not working on his hearing aid and he turns the volume up AU every day. Increased overall gain by 6 AU and turned off sound recover 2 but the change produced FB. Ran FB test AU that resolved the FB issue. Scheduled him to RTC on 4/13/23 for his annual hearing test and HA appt. Pt will bring notes in to discuss changes made during today's visit to determine the need for further changes.

## 2023-03-30 ENCOUNTER — LAB VISIT (OUTPATIENT)
Dept: LAB | Facility: HOSPITAL | Age: 78
End: 2023-03-30
Attending: INTERNAL MEDICINE
Payer: MEDICARE

## 2023-03-30 ENCOUNTER — OFFICE VISIT (OUTPATIENT)
Dept: FAMILY MEDICINE | Facility: CLINIC | Age: 78
End: 2023-03-30
Payer: MEDICARE

## 2023-03-30 VITALS
WEIGHT: 203.06 LBS | HEART RATE: 62 BPM | DIASTOLIC BLOOD PRESSURE: 64 MMHG | BODY MASS INDEX: 29.13 KG/M2 | SYSTOLIC BLOOD PRESSURE: 108 MMHG | OXYGEN SATURATION: 95 %

## 2023-03-30 DIAGNOSIS — G89.29 CHRONIC LOW BACK PAIN, UNSPECIFIED BACK PAIN LATERALITY, UNSPECIFIED WHETHER SCIATICA PRESENT: ICD-10-CM

## 2023-03-30 DIAGNOSIS — C91.10 CLL (CHRONIC LYMPHOCYTIC LEUKEMIA): ICD-10-CM

## 2023-03-30 DIAGNOSIS — M54.50 CHRONIC LOW BACK PAIN, UNSPECIFIED BACK PAIN LATERALITY, UNSPECIFIED WHETHER SCIATICA PRESENT: ICD-10-CM

## 2023-03-30 DIAGNOSIS — E03.9 ACQUIRED HYPOTHYROIDISM: ICD-10-CM

## 2023-03-30 DIAGNOSIS — R09.89 LABILE HYPERTENSION: ICD-10-CM

## 2023-03-30 DIAGNOSIS — I25.10 CORONARY ARTERY DISEASE INVOLVING NATIVE CORONARY ARTERY OF NATIVE HEART WITHOUT ANGINA PECTORIS: Primary | ICD-10-CM

## 2023-03-30 DIAGNOSIS — I77.810 ASCENDING AORTA DILATATION: ICD-10-CM

## 2023-03-30 LAB — TSH SERPL DL<=0.005 MIU/L-ACNC: 2.31 UIU/ML (ref 0.4–4)

## 2023-03-30 PROCEDURE — 84443 ASSAY THYROID STIM HORMONE: CPT | Performed by: INTERNAL MEDICINE

## 2023-03-30 PROCEDURE — 99214 PR OFFICE/OUTPT VISIT, EST, LEVL IV, 30-39 MIN: ICD-10-PCS | Mod: S$PBB,,, | Performed by: INTERNAL MEDICINE

## 2023-03-30 PROCEDURE — 99999 PR PBB SHADOW E&M-EST. PATIENT-LVL IV: CPT | Mod: PBBFAC,,, | Performed by: INTERNAL MEDICINE

## 2023-03-30 PROCEDURE — 36415 COLL VENOUS BLD VENIPUNCTURE: CPT | Mod: PO | Performed by: INTERNAL MEDICINE

## 2023-03-30 PROCEDURE — 99999 PR PBB SHADOW E&M-EST. PATIENT-LVL IV: ICD-10-PCS | Mod: PBBFAC,,, | Performed by: INTERNAL MEDICINE

## 2023-03-30 PROCEDURE — 99214 OFFICE O/P EST MOD 30 MIN: CPT | Mod: PBBFAC,PO | Performed by: INTERNAL MEDICINE

## 2023-03-30 PROCEDURE — 99214 OFFICE O/P EST MOD 30 MIN: CPT | Mod: S$PBB,,, | Performed by: INTERNAL MEDICINE

## 2023-03-30 RX ORDER — GABAPENTIN 300 MG/1
300 CAPSULE ORAL NIGHTLY
Qty: 90 CAPSULE | Refills: 3 | Status: SHIPPED | OUTPATIENT
Start: 2023-03-30 | End: 2023-06-13

## 2023-03-30 RX ORDER — NAPROXEN SODIUM 220 MG/1
81 TABLET, FILM COATED ORAL EVERY OTHER DAY
COMMUNITY
Start: 2022-10-18

## 2023-03-30 NOTE — PROGRESS NOTES
Subjective     Eligio Richardson is a 78 y.o. old, male here for Follow-up    79 y/o with PMH of CAD, CLL, prostate ca followed by MD linares, BPH, CKD stage 3, hypothyroidism, CLBP.  Here today with his wife  Overall doing well with exception of CLBP that radiates down the left leg and caused permanent issues with lifting his left foot/toes dorsally.  CAD: stable on meds.  CLL: followed by hematology  BPH: stable  Hypothyroidism: on LT4, needs labs rechecked.  Hypotensive and symptomatic with orthostatics recently but a change in BP meds has helped.  He would like to restart gabapentin to see if it helps with his back pain.    Review of Systems   Constitutional: Negative.    Respiratory: Negative.     Cardiovascular: Negative.    Musculoskeletal:  Positive for back pain.   Medications     Outpatient Medications Marked as Taking for the 3/30/23 encounter (Office Visit) with Acosta Corley MD   Medication Sig Dispense Refill    aspirin 81 MG Chew       carvediloL (COREG) 6.25 MG tablet Take 1 tablet (6.25 mg total) by mouth 2 (two) times daily with meals. 180 tablet 0    clopidogreL (PLAVIX) 75 mg tablet Take 1 tablet (75 mg total) by mouth once daily. 30 tablet 11    finasteride (PROSCAR) 5 mg tablet Take 5 mg by mouth once daily.      fluorouraciL (EFUDEX) 5 % cream Apply topically 2 (two) times daily. For 2 weeks. 40 g 1    hydroCHLOROthiazide (HYDRODIURIL) 12.5 MG Tab Take 25 mg by mouth once daily.      HYDROcodone-acetaminophen (NORCO) 7.5-325 mg per tablet Take 1 tablet by mouth every 6 (six) hours as needed.      levothyroxine (SYNTHROID) 88 MCG tablet Take 1 tablet (88 mcg total) by mouth once daily. 90 tablet 3    mag/aluminum/sod bicarb/alginc (GAVISCON ORAL) Take by mouth.      meloxicam (MOBIC) 15 MG tablet Take 1 tablet (15 mg total) by mouth once daily. 90 tablet 3    montelukast (SINGULAIR) 10 mg tablet Take 1 tablet (10 mg total) by mouth once daily. 90 tablet 1    mupirocin (BACTROBAN) 2 %  ointment by Nasal route once daily. 22 g 2    olmesartan (BENICAR) 5 MG Tab Take 1 tablet (5 mg total) by mouth every evening. (Patient taking differently: Take 5 mg by mouth every evening. Half of pill) 90 tablet 0    oxybutynin (DITROPAN-XL) 10 MG 24 hr tablet Take 1 tablet (10 mg total) by mouth once daily. 90 tablet 3    pantoprazole (PROTONIX) 40 MG tablet Take 1 tablet twice a day by oral route in the morning for 90 days.      rosuvastatin (CRESTOR) 10 MG tablet Take 1 tablet (10 mg total) by mouth every evening. 90 tablet 1    tamsulosin (FLOMAX) 0.4 mg Cap Take 1 capsule (0.4 mg total) by mouth 2 (two) times a day. 180 capsule 3     Objective     /64 (BP Location: Right arm, Patient Position: Sitting)   Pulse 62   Wt 92.1 kg (203 lb 0.7 oz)   SpO2 95%   BMI 29.13 kg/m²   Physical Exam  Constitutional:       General: He is not in acute distress.     Appearance: Normal appearance. He is well-developed.   Cardiovascular:      Rate and Rhythm: Normal rate and regular rhythm.      Heart sounds: Murmur heard.   Pulmonary:      Effort: Pulmonary effort is normal. No respiratory distress.      Breath sounds: Normal breath sounds.     Assessment and Plan     Coronary artery disease involving native coronary artery of native heart without angina pectoris    Labile hypertension    CLL (chronic lymphocytic leukemia)    Ascending aorta dilatation    Acquired hypothyroidism  -     TSH; Future; Expected date: 03/30/2023    Chronic low back pain, unspecified back pain laterality, unspecified whether sciatica present  -     gabapentin (NEURONTIN) 300 MG capsule; Take 1 capsule (300 mg total) by mouth every evening.  Dispense: 90 capsule; Refill: 3        Follow up in about 6 months (around 9/30/2023).  ___________________  Acosta Corley MD  Internal Medicine and Pediatrics

## 2023-04-13 ENCOUNTER — CLINICAL SUPPORT (OUTPATIENT)
Dept: AUDIOLOGY | Facility: CLINIC | Age: 78
End: 2023-04-13
Payer: MEDICARE

## 2023-04-13 DIAGNOSIS — H93.293 IMPAIRED AUDITORY DISCRIMINATION, BILATERAL: ICD-10-CM

## 2023-04-13 DIAGNOSIS — Z97.4 WEARS HEARING AID IN BOTH EARS: Primary | ICD-10-CM

## 2023-04-13 DIAGNOSIS — H90.3 BILATERAL SENSORINEURAL HEARING LOSS: Primary | ICD-10-CM

## 2023-04-13 DIAGNOSIS — H93.13 TINNITUS, BILATERAL: ICD-10-CM

## 2023-04-13 PROCEDURE — 99499 UNLISTED E&M SERVICE: CPT | Mod: S$PBB,,, | Performed by: AUDIOLOGIST-HEARING AID FITTER

## 2023-04-13 PROCEDURE — 92556 SPEECH AUDIOMETRY COMPLETE: CPT | Mod: PBBFAC,PO | Performed by: AUDIOLOGIST-HEARING AID FITTER

## 2023-04-13 PROCEDURE — 99499 NO LOS: ICD-10-PCS | Mod: S$PBB,,, | Performed by: AUDIOLOGIST-HEARING AID FITTER

## 2023-04-13 PROCEDURE — 92552 PURE TONE AUDIOMETRY AIR: CPT | Mod: PBBFAC,PO | Performed by: AUDIOLOGIST-HEARING AID FITTER

## 2023-04-13 NOTE — PROGRESS NOTES
Eligio Richardson was seen 04/13/2023 for an audiological evaluation. Pertinent complaints today include hearing loss and tinnitus AU. Pt reports his left ear is worse than his right. Pt confirms history of loud noise exposure and denies early onset of genetic family history of hearing loss. Otoscopy revealed no cerumen in both ears. The tympanic membrane was visualized AU prior to proceeding with the hearing testing. Pt wears binaural Phonak ERICA hearing aids.     Results reveal a bilateral mild sloping to profound sensorineural hearing loss.    Speech Reception Thresholds were  45 dBHL for the right ear and 45 dBHL for the left ear.    Word recognition scores were fair for the right ear and fair for the left ear. Some significant changes were noted with a decrease in WR AU when compared to previous hearing testing from 4/12/22.     Audiogram results were reviewed in detail with patient and all questions were answered. Results will be reviewed by the referring provider at the completion of this note. Recommend continued daily use of binaural amplification following adjustment to new thresholds from today's hearing test, repeat hearing testing in one year due to tinnitus and noise exposure and bilateral hearing protection with either muffs or in-ear protection in loud noises. Pt was seen immediately following this encounter for a HA adjustment.

## 2023-04-13 NOTE — PROGRESS NOTES
Eligio Richardson came in on 04/13/2023 for an annual hearing aid follow up. Pt stated he was unable to get he wax filters out this morning. He has also been having difficulty understanding conversation in noisy environments and usually turns the volume to the max everyday. Cleaned both aids and changed the wax filters and domes. Listening check revealed aids to sound appropriate for his hearing loss. Recalculated thresholds from new hearing test, increased for speech in quiet being unintelligible by 2 and increased overall gain by 3. Informed pt that a notification will be mailed when it is time for his annual hearing test and that he should call the audiology clinic directly to schedule the appts to coordinate them correctly. Also informed his that if domes or wax filters are needed, call the clinic after which, they will be left at the 2nd floor check in desk to be picked up at the earliest convenience. He should call the clinic PRN otherwise.

## 2023-05-19 ENCOUNTER — PATIENT MESSAGE (OUTPATIENT)
Dept: OTOLARYNGOLOGY | Facility: CLINIC | Age: 78
End: 2023-05-19
Payer: MEDICARE

## 2023-05-19 DIAGNOSIS — J32.0 CHRONIC MAXILLARY SINUSITIS: ICD-10-CM

## 2023-05-19 DIAGNOSIS — R13.19 ESOPHAGEAL DYSPHAGIA: ICD-10-CM

## 2023-05-19 DIAGNOSIS — J32.3 CHRONIC SPHENOIDAL SINUSITIS: Primary | ICD-10-CM

## 2023-05-23 NOTE — TELEPHONE ENCOUNTER
----- Message from Roseann Berger sent at 5/23/2023  4:01 PM CDT -----  Contact: Wife Arianna  Patients wife is calling to schedule patients Sinus surgery with Dr Hernandez. Can we please call pt back to set up at [phone]. Thank You.

## 2023-05-24 ENCOUNTER — TELEPHONE (OUTPATIENT)
Dept: GASTROENTEROLOGY | Facility: CLINIC | Age: 78
End: 2023-05-24
Payer: MEDICARE

## 2023-05-24 NOTE — TELEPHONE ENCOUNTER
Spoke with Annie, pt's wife to schedule EGD. At the moment, there is nothing before scheduled sinus surgery. How long after sinus surgery should we wait before doing EGD?

## 2023-05-24 NOTE — TELEPHONE ENCOUNTER
Spoke with pt's wife, Annie. Informed Dr. Hernandez states about 2 weeks is a good time after his sinus surgery to have EGD. Wife informed if a procedure opens up prior to surgery, I will call to move up EGD. EGD scheduled. Prep instructions sent to pt's [facility]. Annie verbalized understanding to all.    Pt scheduled for EGD June 27 with Dr. Arriola  Can pt safely hold Plavix 5 days prior

## 2023-05-24 NOTE — TELEPHONE ENCOUNTER
----- Message from Rosanne Chiang sent at 5/24/2023  9:09 AM CDT -----  Regarding: pt call back  Name of Who is Calling: Annie (wife)        What is the request in detail: Pt is supposed to be getting a procedure of esophagus stretching and would like a call back to schedule this procedure, Please advise.         Can the clinic reply by [facility]:           What Number to Call Back if not in [facility]: [phone]

## 2023-05-24 NOTE — TELEPHONE ENCOUNTER
Anais Bedolla LPN routed conversation to Flako Oneill 4 minutes ago (3:49 PM)     MD Anais Duncan LPN 13 minutes ago (3:40 PM)       ACCEPTABLE CV RISK       BAYLEE Barnard MD 1 hour ago (2:01 PM)     CG  Okay to hold Plavix x 5 days for EGD

## 2023-05-25 ENCOUNTER — TELEPHONE (OUTPATIENT)
Dept: CARDIOLOGY | Facility: CLINIC | Age: 78
End: 2023-05-25
Payer: MEDICARE

## 2023-05-30 RX ORDER — ROSUVASTATIN CALCIUM 10 MG/1
10 TABLET, COATED ORAL NIGHTLY
Qty: 90 TABLET | Refills: 1 | Status: SHIPPED | OUTPATIENT
Start: 2023-05-30 | End: 2023-07-26 | Stop reason: SDUPTHER

## 2023-06-08 ENCOUNTER — ANESTHESIA EVENT (OUTPATIENT)
Dept: SURGERY | Facility: HOSPITAL | Age: 78
End: 2023-06-08
Payer: MEDICARE

## 2023-06-09 ENCOUNTER — HOSPITAL ENCOUNTER (OUTPATIENT)
Facility: HOSPITAL | Age: 78
Discharge: HOME | End: 2023-06-09
Attending: OTOLARYNGOLOGY | Admitting: OTOLARYNGOLOGY
Payer: MEDICARE

## 2023-06-09 ENCOUNTER — ANESTHESIA (OUTPATIENT)
Dept: SURGERY | Facility: HOSPITAL | Age: 78
End: 2023-06-09
Payer: MEDICARE

## 2023-06-09 DIAGNOSIS — J32.4 CHRONIC PANSINUSITIS: Primary | ICD-10-CM

## 2023-06-09 DIAGNOSIS — J34.89 NASAL OBSTRUCTION: ICD-10-CM

## 2023-06-09 PROCEDURE — 30520 PR REPAIR, NASAL SEPTUM: ICD-10-PCS | Mod: 51,,, | Performed by: OTOLARYNGOLOGY

## 2023-06-09 PROCEDURE — 63600175 PHARM REV CODE 636 W HCPCS: Mod: PO | Performed by: OTOLARYNGOLOGY

## 2023-06-09 PROCEDURE — 25000003 PHARM REV CODE 250: Mod: PO | Performed by: NURSE ANESTHETIST, CERTIFIED REGISTERED

## 2023-06-09 PROCEDURE — 61782 PR STEREOTACTIC COMP ASSIST PROC,CRANIAL,EXTRADURAL: ICD-10-PCS | Mod: 51,,, | Performed by: OTOLARYNGOLOGY

## 2023-06-09 PROCEDURE — 25000003 PHARM REV CODE 250: Mod: PO | Performed by: OTOLARYNGOLOGY

## 2023-06-09 PROCEDURE — 71000033 HC RECOVERY, INTIAL HOUR: Mod: PO | Performed by: OTOLARYNGOLOGY

## 2023-06-09 PROCEDURE — 31267 ENDOSCOPY MAXILLARY SINUS: CPT | Mod: 50,51,, | Performed by: OTOLARYNGOLOGY

## 2023-06-09 PROCEDURE — 63600175 PHARM REV CODE 636 W HCPCS: Mod: PO | Performed by: NURSE ANESTHETIST, CERTIFIED REGISTERED

## 2023-06-09 PROCEDURE — 88305 TISSUE EXAM BY PATHOLOGIST: ICD-10-PCS | Mod: 26,,, | Performed by: PATHOLOGY

## 2023-06-09 PROCEDURE — 61782 SCAN PROC CRANIAL EXTRA: CPT | Mod: 51,,, | Performed by: OTOLARYNGOLOGY

## 2023-06-09 PROCEDURE — D9220A PRA ANESTHESIA: Mod: CRNA,,, | Performed by: NURSE ANESTHETIST, CERTIFIED REGISTERED

## 2023-06-09 PROCEDURE — 88305 TISSUE EXAM BY PATHOLOGIST: CPT | Mod: PO | Performed by: PATHOLOGY

## 2023-06-09 PROCEDURE — 71000015 HC POSTOP RECOV 1ST HR: Mod: PO | Performed by: OTOLARYNGOLOGY

## 2023-06-09 PROCEDURE — 87070 CULTURE OTHR SPECIMN AEROBIC: CPT | Performed by: OTOLARYNGOLOGY

## 2023-06-09 PROCEDURE — 63600175 PHARM REV CODE 636 W HCPCS: Mod: PO | Performed by: ANESTHESIOLOGY

## 2023-06-09 PROCEDURE — 31267 PR NASAL/SINUS ENDOSCOPY,RMV TISS MAXILL SINUS: ICD-10-PCS | Mod: 50,51,, | Performed by: OTOLARYNGOLOGY

## 2023-06-09 PROCEDURE — D9220A PRA ANESTHESIA: ICD-10-PCS | Mod: CRNA,,, | Performed by: NURSE ANESTHETIST, CERTIFIED REGISTERED

## 2023-06-09 PROCEDURE — 36000710: Mod: PO | Performed by: OTOLARYNGOLOGY

## 2023-06-09 PROCEDURE — 37000008 HC ANESTHESIA 1ST 15 MINUTES: Mod: PO | Performed by: OTOLARYNGOLOGY

## 2023-06-09 PROCEDURE — 88305 TISSUE EXAM BY PATHOLOGIST: CPT | Mod: 26,,, | Performed by: PATHOLOGY

## 2023-06-09 PROCEDURE — 25000003 PHARM REV CODE 250: Mod: PO | Performed by: ANESTHESIOLOGY

## 2023-06-09 PROCEDURE — 30520 REPAIR OF NASAL SEPTUM: CPT | Mod: 51,,, | Performed by: OTOLARYNGOLOGY

## 2023-06-09 PROCEDURE — 36000711: Mod: PO | Performed by: OTOLARYNGOLOGY

## 2023-06-09 PROCEDURE — 27201423 OPTIME MED/SURG SUP & DEVICES STERILE SUPPLY: Mod: PO | Performed by: OTOLARYNGOLOGY

## 2023-06-09 PROCEDURE — 31259 PR ENDOSCOPY, NASAL/SINUS, W/ETHMOIDECTOMY/SPHENOIDOTOMY/TISS REMVL: ICD-10-PCS | Mod: 50,,, | Performed by: OTOLARYNGOLOGY

## 2023-06-09 PROCEDURE — 37000009 HC ANESTHESIA EA ADD 15 MINS: Mod: PO | Performed by: OTOLARYNGOLOGY

## 2023-06-09 PROCEDURE — D9220A PRA ANESTHESIA: Mod: ANES,,, | Performed by: ANESTHESIOLOGY

## 2023-06-09 PROCEDURE — D9220A PRA ANESTHESIA: ICD-10-PCS | Mod: ANES,,, | Performed by: ANESTHESIOLOGY

## 2023-06-09 PROCEDURE — 31259 NSL/SINS NDSC SPHN TISS RMVL: CPT | Mod: 50,,, | Performed by: OTOLARYNGOLOGY

## 2023-06-09 RX ORDER — ONDANSETRON 2 MG/ML
INJECTION INTRAMUSCULAR; INTRAVENOUS
Status: DISCONTINUED | OUTPATIENT
Start: 2023-06-09 | End: 2023-06-09

## 2023-06-09 RX ORDER — DEXAMETHASONE SODIUM PHOSPHATE 4 MG/ML
8 INJECTION, SOLUTION INTRA-ARTICULAR; INTRALESIONAL; INTRAMUSCULAR; INTRAVENOUS; SOFT TISSUE
Status: COMPLETED | OUTPATIENT
Start: 2023-06-09 | End: 2023-06-09

## 2023-06-09 RX ORDER — PROPOFOL 10 MG/ML
VIAL (ML) INTRAVENOUS
Status: DISCONTINUED | OUTPATIENT
Start: 2023-06-09 | End: 2023-06-09

## 2023-06-09 RX ORDER — HYDROMORPHONE HYDROCHLORIDE 2 MG/ML
0.2 INJECTION, SOLUTION INTRAMUSCULAR; INTRAVENOUS; SUBCUTANEOUS EVERY 5 MIN PRN
Status: DISCONTINUED | OUTPATIENT
Start: 2023-06-09 | End: 2023-06-09 | Stop reason: HOSPADM

## 2023-06-09 RX ORDER — EPINEPHRINE 1 MG/ML
INJECTION INTRAMUSCULAR; INTRAVENOUS; SUBCUTANEOUS
Status: DISCONTINUED | OUTPATIENT
Start: 2023-06-09 | End: 2023-06-09 | Stop reason: HOSPADM

## 2023-06-09 RX ORDER — OXYCODONE HYDROCHLORIDE 5 MG/1
5 TABLET ORAL
Status: DISCONTINUED | OUTPATIENT
Start: 2023-06-09 | End: 2023-06-09 | Stop reason: HOSPADM

## 2023-06-09 RX ORDER — FENTANYL CITRATE 50 UG/ML
25 INJECTION, SOLUTION INTRAMUSCULAR; INTRAVENOUS EVERY 5 MIN PRN
Status: COMPLETED | OUTPATIENT
Start: 2023-06-09 | End: 2023-06-09

## 2023-06-09 RX ORDER — ROCURONIUM BROMIDE 10 MG/ML
INJECTION, SOLUTION INTRAVENOUS
Status: DISCONTINUED | OUTPATIENT
Start: 2023-06-09 | End: 2023-06-09

## 2023-06-09 RX ORDER — LIDOCAINE HYDROCHLORIDE 10 MG/ML
1 INJECTION, SOLUTION EPIDURAL; INFILTRATION; INTRACAUDAL; PERINEURAL ONCE
Status: DISCONTINUED | OUTPATIENT
Start: 2023-06-09 | End: 2023-06-09 | Stop reason: HOSPADM

## 2023-06-09 RX ORDER — LIDOCAINE HYDROCHLORIDE AND EPINEPHRINE 10; 10 MG/ML; UG/ML
INJECTION, SOLUTION INFILTRATION; PERINEURAL
Status: DISCONTINUED | OUTPATIENT
Start: 2023-06-09 | End: 2023-06-09 | Stop reason: HOSPADM

## 2023-06-09 RX ORDER — LIDOCAINE HYDROCHLORIDE 20 MG/ML
INJECTION, SOLUTION EPIDURAL; INFILTRATION; INTRACAUDAL; PERINEURAL
Status: DISCONTINUED | OUTPATIENT
Start: 2023-06-09 | End: 2023-06-09

## 2023-06-09 RX ORDER — OXYMETAZOLINE HCL 0.05 %
2 SPRAY, NON-AEROSOL (ML) NASAL
Status: COMPLETED | OUTPATIENT
Start: 2023-06-09 | End: 2023-06-09

## 2023-06-09 RX ORDER — MUPIROCIN 20 MG/G
OINTMENT TOPICAL
Status: DISCONTINUED | OUTPATIENT
Start: 2023-06-09 | End: 2023-06-09 | Stop reason: HOSPADM

## 2023-06-09 RX ORDER — SODIUM CHLORIDE, SODIUM LACTATE, POTASSIUM CHLORIDE, CALCIUM CHLORIDE 600; 310; 30; 20 MG/100ML; MG/100ML; MG/100ML; MG/100ML
INJECTION, SOLUTION INTRAVENOUS CONTINUOUS
Status: DISCONTINUED | OUTPATIENT
Start: 2023-06-09 | End: 2023-06-09 | Stop reason: HOSPADM

## 2023-06-09 RX ORDER — HYDROCODONE BITARTRATE AND ACETAMINOPHEN 5; 325 MG/1; MG/1
1 TABLET ORAL EVERY 4 HOURS PRN
Qty: 15 TABLET | Refills: 0 | Status: SHIPPED | OUTPATIENT
Start: 2023-06-09 | End: 2024-02-01

## 2023-06-09 RX ORDER — FENTANYL CITRATE 50 UG/ML
INJECTION, SOLUTION INTRAMUSCULAR; INTRAVENOUS
Status: DISCONTINUED | OUTPATIENT
Start: 2023-06-09 | End: 2023-06-09

## 2023-06-09 RX ORDER — PHENYLEPHRINE HCL IN 0.9% NACL 1 MG/10 ML
SYRINGE (ML) INTRAVENOUS
Status: DISCONTINUED | OUTPATIENT
Start: 2023-06-09 | End: 2023-06-09

## 2023-06-09 RX ORDER — ONDANSETRON 4 MG/1
4 TABLET, ORALLY DISINTEGRATING ORAL EVERY 6 HOURS PRN
Qty: 10 TABLET | Refills: 0 | Status: SHIPPED | OUTPATIENT
Start: 2023-06-09 | End: 2024-02-01

## 2023-06-09 RX ADMIN — Medication 100 MCG: at 11:06

## 2023-06-09 RX ADMIN — FENTANYL CITRATE 25 MCG: 50 INJECTION, SOLUTION INTRAMUSCULAR; INTRAVENOUS at 01:06

## 2023-06-09 RX ADMIN — Medication 2 SPRAY: at 09:06

## 2023-06-09 RX ADMIN — SODIUM CHLORIDE, POTASSIUM CHLORIDE, SODIUM LACTATE AND CALCIUM CHLORIDE: 600; 310; 30; 20 INJECTION, SOLUTION INTRAVENOUS at 12:06

## 2023-06-09 RX ADMIN — Medication 200 MCG: at 11:06

## 2023-06-09 RX ADMIN — ONDANSETRON 4 MG: 2 INJECTION, SOLUTION INTRAMUSCULAR; INTRAVENOUS at 11:06

## 2023-06-09 RX ADMIN — SODIUM CHLORIDE, POTASSIUM CHLORIDE, SODIUM LACTATE AND CALCIUM CHLORIDE: 600; 310; 30; 20 INJECTION, SOLUTION INTRAVENOUS at 09:06

## 2023-06-09 RX ADMIN — FENTANYL CITRATE 25 MCG: 50 INJECTION, SOLUTION INTRAMUSCULAR; INTRAVENOUS at 10:06

## 2023-06-09 RX ADMIN — Medication 200 MCG: at 12:06

## 2023-06-09 RX ADMIN — PROPOFOL 25 MG: 10 INJECTION, EMULSION INTRAVENOUS at 10:06

## 2023-06-09 RX ADMIN — LIDOCAINE HYDROCHLORIDE 100 MG: 20 INJECTION, SOLUTION EPIDURAL; INFILTRATION; INTRACAUDAL; PERINEURAL at 10:06

## 2023-06-09 RX ADMIN — FENTANYL CITRATE 25 MCG: 50 INJECTION, SOLUTION INTRAMUSCULAR; INTRAVENOUS at 02:06

## 2023-06-09 RX ADMIN — DEXAMETHASONE SODIUM PHOSPHATE 8 MG: 4 INJECTION INTRA-ARTICULAR; INTRALESIONAL; INTRAMUSCULAR; INTRAVENOUS; SOFT TISSUE at 09:06

## 2023-06-09 RX ADMIN — SUGAMMADEX 190 MG: 100 INJECTION, SOLUTION INTRAVENOUS at 12:06

## 2023-06-09 RX ADMIN — OXYCODONE HYDROCHLORIDE 5 MG: 5 TABLET ORAL at 01:06

## 2023-06-09 RX ADMIN — PROPOFOL 125 MG: 10 INJECTION, EMULSION INTRAVENOUS at 10:06

## 2023-06-09 RX ADMIN — ROCURONIUM BROMIDE 30 MG: 10 INJECTION, SOLUTION INTRAVENOUS at 10:06

## 2023-06-09 NOTE — PATIENT INSTRUCTIONS
Post-op Sinus / Nasal Surgery  Dick Hernandez MD  Department of Otolaryngology, Head and Neck Surgery  [facility]      PHONE NUMBERS:    Office number: [phone]  EMERGENCY: call 911  URGENT ISSUES or after hours: [phone]  My cell phone: [phone]    WHAT TO DO    1. You should follow-up on:  1 week  Please call [phone] to set up a specific time or to change dates.  Call me ASAP if you are experiencing any unexpected problems.  2. Use the recommended medications / treatments as described  3. Familiarize yourself with the information in this pamphlet  4. Call me with questions. Also, please call me the day after surgery to let me know how you are doing.  5.  Cell phone: [phone], please call me with any concerns or questions. I am not always available to answer my cell phone, so for any urgent or important issues, use the other numbers provided above.    WHAT TO EXPECT    Nasal Discharge & Bleeding  It is common to have mild bleeding and nasal drainage after nasal / sinus surgery.  After surgery, a drip pad may have been placed under your nose. You may remove this at any point and can replace it if necessary at your discretion.    Pain & Pressure  It is common to experience mild-to- moderate pain and pressure in your face and around your eyes.  The pain usually is worst the first day after surgery.  Use your medications as described below.  For any severe pain, please contact me or present to the emergency room.    Nasal Congestion & Crusting  It is common to experience nasal congestion after surgery. This is due to swelling and scabs. DO NOT BLOW YOUR NOSE until I say it is OK.  Crusts are essentially scabs and mucus that build up in the nasal passages after surgery.  Crusts eventually resolve. Usually, I will remove some crusts during your postoperative visit.  I want you to rinse your nose at least 3-4 times daily, more if you would like.   Nasal irrigation  helps to soften and remove scabs. Start irrigation NOW  Nasal irrigation kits are available over the counter in the nasal section. Common brands include SinuCleanse or NeilMed.    Nasal Irrigations  This will help remove the allergens, debris, and mucous from your nose to help you breathe. It will also clear it in preparation for other nasal medications.    To perform, purchase an over the counter sinus irrigation kit such as the NeilMed Sinus Rinse Kit. Use as directed on the box. You should use distilled water or water that was previously boiled and left to cool. If you wish, you may make your own solution. However, salt packets are available in the nasal section in your  drug store.     A rough estimate for making salt solution is:  8oz water  2 teaspoons salt (pickling, lorri or Kosher salt)  1 teaspoon baking soda    After each use, rinse the bottle with small amount of rubbing alcohol and clean with soap.  Replace the irrigation bottle if it becomes visibly soiled or every few weeks.      Fatigue  It is common to feel mild to moderate fatigue for 7-10 days after surgery.    ACTIVITY    First 1-2 days after surgery  Plan to rest. You may start light activities as tolerated.  Days 2 though 10 following surgery  Light activity only until 10 days after surgery, gradually increase activity.  No extensive bending over for 7 days after surgery.  No lifting over 20 pounds for one week after surgery  You may shower starting 1 day after surgery    NUTRITION    Day of surgery  Drink plenty of water / fluids  Eat light snacks as tolerated  It is common for people to have less of an appetite the day of surgery    Day after surgery  Advance your diet as tolerated    MEDICATIONS    Norco: take one or two every 4-6 hours as needed for pain.  [place] law now prohibits prescription of more than 1 week of narcotic pain medication. You should wean from narcotic pain medication by that point, but it not, you will need to come  to have a refill at that point approved by your doctor.  Oral antibiotics: none  Mupirocin ointment: place a pea-sized amount in your nose twice daily for 5 days.  NOTE: please take an over the counter stool softener while you are on pain medications - they may cause constipation.    MEDICATION SAFETY TIPS    Use medications only as directed in the amounts specified  Avoid aspirin, ibuprofen, naproxen, and related pain medications for 10 days. Avoid fish oil (omega acids) and vitamin E for 1 week.  As your pain lessens, you may Replace doses of prescription pain medications with acetaminophen (Tylenol). However, Do Not take doses of prescription pain medications at the same time as Tylenol because this could cause an overdose of Tylenol.  Do Not drive or operate machinery if taking prescription pain medications  Read each medication label carefully, ask your pharmacist if you have any questions regarding warnings on the label  Do not measure liquid with a kitchen spoon. There are pediatric measuring devices available at the pharmacy. Ask for one when you get your prescription filled.  Store all mediations out of reach of children.    Call my office if you experience any of the following:    Fever higher than 101 F  Clear, watery nasal discharge  Any visual changes or marked swelling around the eyes  Severe headache or neck stiffness  Brisk bleeding

## 2023-06-09 NOTE — ANESTHESIA POSTPROCEDURE EVALUATION
Anesthesia Post Evaluation    Patient: Eligio Richardson    Procedure(s) Performed: Procedure(s) (LRB):  SINUS SURGERY FUNCTIONAL ENDOSCOPIC WITH NAVIGATION (Bilateral)  SEPTOPLASTY (Bilateral)    Final Anesthesia Type: general      Patient location during evaluation: PACU  Patient participation: Yes- Able to Participate  Level of consciousness: awake and alert and oriented  Post-procedure vital signs: reviewed and stable  Pain management: adequate  Airway patency: patent    PONV status at discharge: No PONV  Anesthetic complications: no      Cardiovascular status: blood pressure returned to baseline and stable  Respiratory status: unassisted and spontaneous ventilation  Hydration status: euvolemic  Follow-up not needed.          Vitals Value Taken Time   /84 06/09/23 1438   Temp 36.7 °C (98 °F) 06/09/23 1438   Pulse 52 06/09/23 1438   Resp 20 06/09/23 1438   SpO2 96 % 06/09/23 1438         Event Time   Out of Recovery 14:15:00         Pain/Mellissa Score: Pain Rating Prior to Med Admin: 3 (6/9/2023  2:09 PM)  Pain Rating Post Med Admin: 3 (6/9/2023  2:15 PM)  Mellissa Score: 10 (6/9/2023  2:15 PM)

## 2023-06-09 NOTE — DISCHARGE INSTRUCTIONS
"Discharge Instructions: After Your Surgery/Procedure  Youve just had surgery. During surgery you were given medicine called anesthesia to keep you relaxed and free of pain. After surgery you may have some pain or nausea. This is common. Here are some tips for feeling better and getting well after surgery.     Stay on schedule with your medication.   Going home  Your doctor or nurse will show you how to take care of yourself when you go home. He or she will also answer your questions. Have an adult family member or friend drive you home.      For your safety we recommend these precaution for the first 24 hours after your procedure:  Do not drive or use heavy equipment.  Do not make important decisions or sign legal papers.  Do not drink alcohol.  Have someone stay with you, if needed. He or she can watch for problems and help keep you safe.  Your concentration, balance, coordination, and judgement may be impaired for many hours after anesthesia.  Use caution when ambulating or standing up.     You may feel weak and "washed out" after anesthesia and surgery.      Subtle residual effects of general anesthesia or sedation with regional / local anesthesia can last more than 24 hours.  Rest for the remainder of the day or longer if your Doctor/Surgeon has advised you to do so.  Although you may feel normal within the first 24 hours, your reflexes and mental ability may be impaired without you realizing it.  You may feel dizzy, lightheaded or sleepy for 24 hours or longer.      Be sure to go to all follow-up visits with your doctor. And rest after your surgery for as long as your doctor tells you to.  Coping with pain  If you have pain after surgery, pain medicine will help you feel better. Take it as told, before pain becomes severe. Also, ask your doctor or pharmacist about other ways to control pain. This might be with heat, ice, or relaxation. And follow any other instructions your surgeon or nurse gives you.  Tips " for taking pain medicine  To get the best relief possible, remember these points:  Pain medicines can upset your stomach. Taking them with a little food may help.  Most pain relievers taken by mouth need at least 20 to 30 minutes to start to work.  Taking medicine on a schedule can help you remember to take it. Try to time your medicine so that you can take it before starting an activity. This might be before you get dressed, go for a walk, or sit down for dinner.  Constipation is a common side effect of pain medicines. Call your doctor before taking any medicines such as laxatives or stool softeners to help ease constipation. Also ask if you should skip any foods. Drinking lots of fluids and eating foods such as fruits and vegetables that are high in fiber can also help. Remember, do not take laxatives unless your surgeon has prescribed them.  Drinking alcohol and taking pain medicine can cause dizziness and slow your breathing. It can even be deadly. Do not drink alcohol while taking pain medicine.  Pain medicine can make you react more slowly to things. Do not drive or run machinery while taking pain medicine.  Your health care provider may tell you to take acetaminophen to help ease your pain. Ask him or her how much you are supposed to take each day. Acetaminophen or other pain relievers may interact with your prescription medicines or other over-the-counter (OTC) drugs. Some prescription medicines have acetaminophen and other ingredients. Using both prescription and OTC acetaminophen for pain can cause you to overdose. Read the labels on your OTC medicines with care. This will help you to clearly know the list of ingredients, how much to take, and any warnings. It may also help you not take too much acetaminophen. If you have questions or do not understand the information, ask your pharmacist or health care provider to explain it to you before you take the OTC medicine.  Managing nausea  Some people have an  upset stomach after surgery. This is often because of anesthesia, pain, or pain medicine, or the stress of surgery. These tips will help you handle nausea and eat healthy foods as you get better. If you were on a special food plan before surgery, ask your doctor if you should follow it while you get better. These tips may help:  Do not push yourself to eat. Your body will tell you when to eat and how much.  Start off with clear liquids and soup. They are easier to digest.  Next try semi-solid foods, such as mashed potatoes, applesauce, and gelatin, as you feel ready.  Slowly move to solid foods. Dont eat fatty, rich, or spicy foods at first.  Do not force yourself to have 3 large meals a day. Instead eat smaller amounts more often.  Take pain medicines with a small amount of solid food, such as crackers or toast, to avoid nausea.     Call your surgeon if  You still have pain an hour after taking medicine. The medicine may not be strong enough.  You feel too sleepy, dizzy, or groggy. The medicine may be too strong.  You have side effects like nausea, vomiting, or skin changes, such as rash, itching, or hives.       If you have obstructive sleep apnea  You were given anesthesia medicine during surgery to keep you comfortable and free of pain. After surgery, you may have more apnea spells because of this medicine and other medicines you were given. The spells may last longer than usual.   At home:  Keep using the continuous positive airway pressure (CPAP) device when you sleep. Unless your health care provider tells you not to, use it when you sleep, day or night. CPAP is a common device used to treat obstructive sleep apnea.  Talk with your provider before taking any pain medicine, muscle relaxants, or sedatives. Your provider will tell you about the possible dangers of taking these medicines.  © 5481-2465 [employer]. [address] [place] [address], [place] [address]. All rights reserved. This information is  not intended as a substitute for professional medical care. Always follow your healthcare professional's instructions.

## 2023-06-09 NOTE — ANESTHESIA PROCEDURE NOTES
Intubation    Date/Time: 6/9/2023 10:58 AM  Performed by: Lelia Ragland CRNA  Authorized by: Grace Islas MD     Intubation:     Induction:  Intravenous    Intubated:  Postinduction    Mask Ventilation:  Easy mask    Attempts:  1    Attempted By:  CRNA    Method of Intubation:  Video laryngoscopy    Blade:  Shay 3    Laryngeal View Grade: Grade I - full view of cords      Difficult Airway Encountered?: No      Complications:  None    Airway Device:  Oral endotracheal tube    Airway Device Size:  7.0    Style/Cuff Inflation:  Cuffed (inflated to minimal occlusive pressure)    Tube secured:  23    Secured at:  The lips    Placement Verified By:  Capnometry    Complicating Factors:  None    Findings Post-Intubation:  BS equal bilateral and atraumatic/condition of teeth unchanged

## 2023-06-09 NOTE — H&P
Subjective:       Patient ID: Eligio Richardson is a 78 y.o. male.    Chief Complaint: No chief complaint on file.      Eligio is here for chronic sinusitis. Here for sinus surgery. No changes since clinic visit     Review of Systems   Constitutional: Negative for activity change and appetite change.   Eyes: Negative for discharge.   Respiratory: Negative for difficulty breathing and wheezing   Cardiovascular: Negative for chest pain.   Gastrointestinal: Negative for abdominal distention and abdominal pain.   Endocrine: Negative for cold intolerance and heat intolerance.   Genitourinary: Negative for dysuria.   Musculoskeletal: Negative for gait problem and joint swelling.   Skin: Negative for color change and pallor.   Neurological: Negative for syncope and weakness.   Psychiatric/Behavioral: Negative for agitation and confusion.     Objective:        Constitutional:   He is oriented to person, place, and time. He appears well-developed and well-nourished. He appears alert. He is active. Normal speech.      Head:  Normocephalic and atraumatic. Head is without TMJ tenderness. No scars. Salivary glands normal.  Facial strength is normal.      Ears:    Right Ear: No drainage or swelling. No middle ear effusion.   Left Ear: No drainage or swelling.  No middle ear effusion.     Nose:  Mucosal edema present. No rhinorrhea or sinus tenderness. No turbinate hypertrophy.      Mouth/Throat  Oropharynx clear and moist without lesions or asymmetry, normal uvula midline and mirror exam normal. Normal dentition. No uvula swelling, lacerations or trismus. No oropharyngeal exudate. Tonsillar erythema, tonsillar exudate.      Neck:  Full range of motion with neck supple and no adenopathy. Thyroid tenderness is present. No tracheal deviation, no edema, no erythema, normal range of motion, no stridor, no crepitus and no neck rigidity present. No thyroid mass present.     Cardiovascular:    Intact distal pulses and normal pulses.               Pulmonary/Chest:   Effort normal and breath sounds normal. No stridor.     Psychiatric:   His speech is normal and behavior is normal. His mood appears not anxious. His affect is not labile.     Neurological:   He is alert and oriented to person, place, and time. No sensory deficit.     Skin:   No abrasions, lacerations, lesions, or rashes. No abrasion and no bruising noted.       Tests / Results:  Reviewed     Assessment:       1. Chronic pansinusitis    2. Nasal obstruction          Plan:         FESS as planned

## 2023-06-09 NOTE — BRIEF OP NOTE
[facility] - [facility]  Brief Operative Note     SUMMARY     Surgery Date: 6/9/2023     Surgeon(s) and Role:     * Dick Wiggins MD - Primary    Assisting Surgeon: None    Pre-op Diagnosis:  Chronic sphenoidal sinusitis [J32.3]  Chronic maxillary sinusitis [J32.0]    Post-op Diagnosis:  Post-Op Diagnosis Codes:     * Chronic sphenoidal sinusitis [J32.3]     * Chronic maxillary sinusitis [J32.0]    Procedure(s) (LRB):  SINUS SURGERY FUNCTIONAL ENDOSCOPIC WITH NAVIGATION (Bilateral)  SEPTOPLASTY (Bilateral)    Anesthesia: General    Description of the findings of the procedure: FESS septum,    Findings/Key Components: FESS, septum    Estimated Blood Loss: * No values recorded between 6/9/2023 11:17 AM and 6/9/2023  1:19 PM *         Specimens:   Specimen (24h ago, onward)       Start     Ordered    06/09/23 1303  Specimen to Pathology, Surgery ENT  Once        Comments: Pre-op Diagnosis: Chronic sphenoidal sinusitis [J32.3]Chronic maxillary sinusitis [J32.0]Procedure(s):SINUS SURGERY FUNCTIONAL ENDOSCOPIC WITH NAVIGATIONSEPTOPLASTY Number of specimens: 1Name of specimens: 1. SINUS CONTENTS     References:    Click here for ordering Quick Tip   Question Answer Comment   Procedure Type: ENT    Which provider would you like to cc? DICK WIGGINS    Release to patient Immediate        06/09/23 1303                    Discharge Note    SUMMARY     Admit Date: 6/9/2023    Discharge Date and Time:  06/09/2023 1:19 PM    Hospital Course (synopsis of major diagnoses, care, treatment, and services provided during the course of the hospital stay): Did well following surgery and was discharged uneventfully     Final Diagnosis: Post-Op Diagnosis Codes:     * Chronic sphenoidal sinusitis [J32.3]     * Chronic maxillary sinusitis [J32.0]    Disposition: Home or Self Care    Follow Up/Patient Instructions: Regular diet, Follow-up Monday. Activity light    Medications:  Reconciled Home Medications:   Current Discharge Medication  List        START taking these medications    Details   HYDROcodone-acetaminophen (NORCO) 5-325 mg per tablet Take 1 tablet by mouth every 4 (four) hours as needed for Pain.  Qty: 15 tablet, Refills: 0      ondansetron (ZOFRAN-ODT) 4 MG TbDL Take 1 tablet (4 mg total) by mouth every 6 (six) hours as needed (Nausea).  Qty: 10 tablet, Refills: 0           CONTINUE these medications which have NOT CHANGED    Details   aluminum hydrox-magnesium carb 254-237.5 mg/5 mL Susp Take 10 mLs by mouth daily as needed (stomach upset).       aspirin 81 MG Chew Take 81 mg by mouth every other day.      carvediloL (COREG) 6.25 MG tablet Take 1 tablet (6.25 mg total) by mouth 2 (two) times daily with meals.  Qty: 180 tablet, Refills: 0    Comments: .      clopidogreL (PLAVIX) 75 mg tablet Take 1 tablet (75 mg total) by mouth once daily.  Qty: 30 tablet, Refills: 11      finasteride (PROSCAR) 5 mg tablet Take 5 mg by mouth once daily.      gabapentin (NEURONTIN) 300 MG capsule Take 1 capsule (300 mg total) by mouth every evening.  Qty: 90 capsule, Refills: 3    Associated Diagnoses: Chronic low back pain, unspecified back pain laterality, unspecified whether sciatica present      hydroCHLOROthiazide (HYDRODIURIL) 12.5 MG Tab Take 25 mg by mouth once daily.      L gasseri/B bifidum/B longum (PROBIOTIC COLON CARE ORAL) Take 1 tablet by mouth once daily.      levothyroxine (SYNTHROID) 88 MCG tablet Take 1 tablet (88 mcg total) by mouth once daily.  Qty: 90 tablet, Refills: 3    Associated Diagnoses: Acquired hypothyroidism      mag/aluminum/sod bicarb/alginc (GAVISCON ORAL) Take by mouth.      meloxicam (MOBIC) 15 MG tablet Take 1 tablet (15 mg total) by mouth once daily.  Qty: 90 tablet, Refills: 3    Associated Diagnoses: Degenerative disc disease, cervical      montelukast (SINGULAIR) 10 mg tablet Take 1 tablet (10 mg total) by mouth once daily.  Qty: 90 tablet, Refills: 1      mupirocin (BACTROBAN) 2 % ointment by Nasal route once  daily.  Qty: 22 g, Refills: 2    Associated Diagnoses: Chronic maxillary sinusitis      olmesartan (BENICAR) 5 MG Tab Take 1 tablet (5 mg total) by mouth every evening.  Qty: 90 tablet, Refills: 0    Comments: .      oxybutynin (DITROPAN-XL) 10 MG 24 hr tablet Take 1 tablet (10 mg total) by mouth once daily.  Qty: 90 tablet, Refills: 3    Associated Diagnoses: Overactive bladder      pantoprazole (PROTONIX) 40 MG tablet Take 1 tablet twice a day by oral route in the morning for 90 days.      rosuvastatin (CRESTOR) 10 MG tablet Take 1 tablet (10 mg total) by mouth every evening.  Qty: 90 tablet, Refills: 1      tamsulosin (FLOMAX) 0.4 mg Cap Take 1 capsule (0.4 mg total) by mouth 2 (two) times a day.  Qty: 180 capsule, Refills: 3    Associated Diagnoses: Benign non-nodular prostatic hyperplasia without lower urinary tract symptoms      fluorouraciL (EFUDEX) 5 % cream Apply topically 2 (two) times daily. For 2 weeks.  Qty: 40 g, Refills: 1    Comments: This prescription was filled on 7/13/2021. Any refills authorized will be placed on file.           STOP taking these medications       HYDROcodone-acetaminophen (NORCO) 7.5-325 mg per tablet Comments:   Reason for Stopping:             No discharge procedures on file.

## 2023-06-09 NOTE — PLAN OF CARE
Pt tolerated procedure well . Minimal pain no nausea  Tolerates fluids well. Ambulates.  Pt meets discharge criteria. Pt states he is ready to go home.

## 2023-06-09 NOTE — TRANSFER OF CARE
"Anesthesia Transfer of Care Note    Patient: Eligio Richardson    Procedure(s) Performed: Procedure(s) (LRB):  SINUS SURGERY FUNCTIONAL ENDOSCOPIC WITH NAVIGATION (Bilateral)  SEPTOPLASTY (Bilateral)    Patient location: PACU    Anesthesia Type: general    Transport from OR: Transported from OR on 6-10 L/min O2 by face mask with adequate spontaneous ventilation    Post pain: adequate analgesia    Post assessment: no apparent anesthetic complications and tolerated procedure well    Post vital signs: stable    Level of consciousness: awake and responds to stimulation    Nausea/Vomiting: no nausea/vomiting    Complications: none    Transfer of care protocol was followed      Last vitals:   Visit Vitals  BP (!) 182/87 (BP Location: Right arm, Patient Position: Lying)   Pulse (!) 52   Temp 36.5 °C (97.7 °F)   Resp 16   Ht 5' 10" (1.778 m)   Wt 92.1 kg (203 lb)   SpO2 98%   BMI 29.13 kg/m²     "

## 2023-06-09 NOTE — OP NOTE
06/09/2023     Name: Eligio Richardson   MRN: [id number]   YOB: 1945     Pre-procedure diagnoses:  1. Chronic pansinusitis    2. Nasal obstruction    3. Septal deviation    Post-procedure diagnoses:  1. Chronic pansinusitis    2. Nasal obstruction     3. Septal deviation    Procedures performed  Septoplasty  Bilateral Maxillary Antrostomy with removal of tissue  Bilateral Total Ethmoidectomy  Bilateral Sphenoid Sinusotomy with removal of tissue  Stereotactic Navigation for 2 hours    Surgeon: Dick Hernandez  Assistants: None    Anesthesia: General, Endotracheal    Specimens:  Sinus contents  Sinus culture    Complications: None apparent    Blood Loss: 200 cc    Operative findings:  Left: moderate left septal deviation preventing access to middle meatus. Septoplasty performed for access. Polyposis of uncinate and sphenoethmoidal recess. Purulence in maxillary sinus, posterior ethmoid, and sphenoid. Moderate inflammatory changes of ethmoid.  Right: Polyposis of uncinate and sphenoethmoidal recess. Purulence in maxillary and sphenoid.  Implants: Novapak    Disposition: PACU    Indications:  Eligio Richardson is a 78 y.o. male who was seen in clinic for evaluation of the above diagnosis. Based on clinical assessment, the following procedures were discussed as an option for treatment. After risks, benefits, and alternatives were discussed the patient decided to proceed. Specific risks that were discussed with the patient included anesthesia risks, scarring, recurrence, bleeding, persistence of disease, change in smell, change in / loss of vision, CSF leak / meningitis, septal perforation, epiphora.    Procedure in detail:  The patient was seen in the pre-operative holding area, and consent was signed. They were wheeled into the operating room and placed supine on the table. Anesthesia was induced via endotracheal tube. Pledgets soaked in 1:10,000 Epinephrine were used for decongestion, being mindful to monitor  vitals during the procedure. The patient was prepped and draped in the usual fashion. A mixture of 0 and angled degree telescopes were used during the procedure.    1% Lidocaine with 1:100,000 epinephrine was used for injection into the ground lamella insertion of the middle turbinate.     STEREOTACTIC NAVIGATION: Stereotactic navigation was required for the use to confirm anatomy, identify landmarks, and avoid complications. The [employer] navigation system was used. The patient's pre-operative CT scan was uploaded into the Fusion system . The patient's facial landmarks were synced to the CT scan and confirmed to be in proper working order. Accuracy of the system was within 1 mm and was used for entire procedure.    There was a moderate septal deviation that prevented access to left sinuses. A septoplasty was required for access.    Septoplasty: A left sided tita transfixion incision was made and I dissected into the avascular subperichondrial plane. The mucoperichondrial flap was elevated beyond the bony-cartilaginous junction. I then made a cartilaginous incision, preserving > 1 cm of L-strut for nasal support. I raised the contralateral mucoperichondrial flap and isolated the septum. I then removed deviated portions of the cartilaginous and bony septum. Care was taken to be mindful of the nasal floor and skull base. Once all of the deviated portions were removed, the flaps were irrigated to remove loose bony chips. There was no perforation. I made a small control rent on the right to allow blood egress. A 4-0 plaint gut was used to close the tita transfixion incision and then a whipstitch was thrown to re approximate the flaps.    Bilateral Maxillary antrostomy with removal of tissue: I began on the left side. The middle turbinate was gently medialized, exposing the uncinate process. There was polypoid mucosa obstruction the outflow tract. The uncinate was gently out fractured. I divided the horizontal and  vertical portions of the uncinate and performed an uncinectomy, removing first the vertical uncinate up to it's superior insertion, then horizontal bone anterior until the maxillary ridge. Care was taken to avoid the injury to the nasolacrimal duct. The natural maxillary os was identified and a large antrostomy was created. . The mucosa of the sinus was edematous and there was polypoid mucosa of the inferior orbital wall that I removed. There was a dense amount of purulence removed.This was cultured. This was thick and required irrigating the sinus multiple times. The same procedure was performed on the right side, where the os was identified in a similar way after removal of the uncinate, and a large antrostomy was created. There was purulence requiring removal in the same way as the left.     Bilateral Total Ethmoidectomy: I identified the left ethmoid bulla. This was carefully taken down, exposing the basal lamella. I then entered the basal lamella lateral and superior, exposing the posterior ethmoid cells. The posterior ethmoids were taken down back the sphenoid face, between the middle and superior turbinate and lamina papyracea. I identified the superior turbinate and was cognizant of the skull base in this region. An angled scope was then used to trace the skull base from posterior and anterior. I then completed my ethmoidectomy in this fashion, removing cells and septations along the skull base. The agger nasi was finally exposed and taken down. Care was taken to avoid the anterior ethmoid artery. Care was taken to avoid blocking the frontal recess. There were moderate inflammatory change of the ethmoid cavity. Although I had planned to only perform a right sided maxillary, intra-operative findings were indicative of sinus involvement of the ethmoid and sphenoid on the right. The decision was made to proceed with opening the ethmoids in this side. The same procedure was performed on the right with the  following findings inflammatory changes of the posterior ethmoid near the SE recess..      Bilateral Sphenoid Sinusotomy: I then identified the left natural sphenoid os. The inferior 1/3 of the superior turbinate was removed carefully, preserving the olfactory nerve roots. After this, I was able to gently enter the sphenoid sinus. I carefully performed a large sphenoid sinusotomy, widening the os to allow visualization. I removed the anterior face until it was flush with the ethmoid roof and laterally to the optic strut. There was purulence irrigated from the sinus. The same procedure was performed on the right, where I identified polypoid mucosa at the sphenoid os. A large antrostomy was created and purulence removed.    I completed the anterior ethmoidectomy and ensured that the frontal sinus was patent. A frontal antrostomy was not needed.     Novapak  was placed in the middle meatus.     Kowalski splints were placed bilaterally and secured with a Nylon stitch at the conclusion of the case.    The wound was copiously irrigated. This marked the end of the procedure. The patient was turned back over to the Anesthesia team.  They were awoken and transported back to the PACU in stable condition. Counts were correct. I performed the entire procedure.

## 2023-06-12 ENCOUNTER — OFFICE VISIT (OUTPATIENT)
Dept: OTOLARYNGOLOGY | Facility: CLINIC | Age: 78
End: 2023-06-12
Payer: MEDICARE

## 2023-06-12 VITALS — BODY MASS INDEX: 29.1 KG/M2 | HEIGHT: 70 IN | WEIGHT: 203.25 LBS

## 2023-06-12 VITALS
RESPIRATION RATE: 20 BRPM | BODY MASS INDEX: 29.06 KG/M2 | SYSTOLIC BLOOD PRESSURE: 155 MMHG | HEIGHT: 70 IN | HEART RATE: 52 BPM | WEIGHT: 203 LBS | TEMPERATURE: 98 F | DIASTOLIC BLOOD PRESSURE: 84 MMHG | OXYGEN SATURATION: 96 %

## 2023-06-12 DIAGNOSIS — Z98.890 HISTORY OF ENDOSCOPIC SINUS SURGERY: ICD-10-CM

## 2023-06-12 DIAGNOSIS — J32.4 CHRONIC PANSINUSITIS: Primary | ICD-10-CM

## 2023-06-12 LAB — BACTERIA SPEC AEROBE CULT: NORMAL

## 2023-06-12 PROCEDURE — 99213 OFFICE O/P EST LOW 20 MIN: CPT | Mod: PBBFAC,PO | Performed by: STUDENT IN AN ORGANIZED HEALTH CARE EDUCATION/TRAINING PROGRAM

## 2023-06-12 PROCEDURE — 99999 PR PBB SHADOW E&M-EST. PATIENT-LVL III: CPT | Mod: PBBFAC,,, | Performed by: STUDENT IN AN ORGANIZED HEALTH CARE EDUCATION/TRAINING PROGRAM

## 2023-06-12 PROCEDURE — 99024 PR POST-OP FOLLOW-UP VISIT: ICD-10-PCS | Mod: POP,,, | Performed by: STUDENT IN AN ORGANIZED HEALTH CARE EDUCATION/TRAINING PROGRAM

## 2023-06-12 PROCEDURE — 99024 POSTOP FOLLOW-UP VISIT: CPT | Mod: POP,,, | Performed by: STUDENT IN AN ORGANIZED HEALTH CARE EDUCATION/TRAINING PROGRAM

## 2023-06-12 PROCEDURE — 99999 PR PBB SHADOW E&M-EST. PATIENT-LVL III: ICD-10-PCS | Mod: PBBFAC,,, | Performed by: STUDENT IN AN ORGANIZED HEALTH CARE EDUCATION/TRAINING PROGRAM

## 2023-06-12 RX ORDER — FLUTICASONE PROPIONATE 50 MCG
1 SPRAY, SUSPENSION (ML) NASAL 2 TIMES DAILY
COMMUNITY
Start: 2023-04-21

## 2023-06-12 RX ORDER — CARVEDILOL 6.25 MG/1
6.25 TABLET ORAL 2 TIMES DAILY WITH MEALS
Qty: 180 TABLET | Refills: 0 | Status: SHIPPED | OUTPATIENT
Start: 2023-06-12 | End: 2023-09-07 | Stop reason: SDUPTHER

## 2023-06-12 RX ORDER — CIPROFLOXACIN 500 MG/1
500 TABLET ORAL EVERY 12 HOURS
COMMUNITY
Start: 2023-04-21 | End: 2023-06-19

## 2023-06-12 NOTE — PROGRESS NOTES
ENT POST OP    S: Went to ER yesterday for double vision, blurred vision, pain. CT showed edema but eyes were ok other than possible edema medial. Double vision is gone. Got steroids, abx, and pain medicine in ER. Pain is better. Tool 2 pain meds in past 24 hours. Doing sinus rinses, has issues yesterday, but a couple of blood clots.     Surgery 6/9 Mary    Post-procedure diagnoses:  1. Chronic pansinusitis    2. Nasal obstruction     3. Septal deviation     Procedures performed  Septoplasty  Bilateral Maxillary Antrostomy with removal of tissue  Bilateral Total Ethmoidectomy  Bilateral Sphenoid Sinusotomy with removal of tissue  Stereotactic Navigation for 2 hours       Operative findings:  Left: moderate left septal deviation preventing access to middle meatus. Septoplasty performed for access. Polyposis of uncinate and sphenoethmoidal recess. Purulence in maxillary sinus, posterior ethmoid, and sphenoid. Moderate inflammatory changes of ethmoid.  Right: Polyposis of uncinate and sphenoethmoidal recess. Purulence in maxillary and sphenoid.  Implants: Novapak    CT: Impression:6/11/23     1. Near complete opacification of the paranasal sinuses.  2. Mild edema medial to the right medial rectus muscle.  No focal fluid collection or retro bulbar hematoma.    O:   EOMI intact but with some pain with right medial gaze. Pupils reactive.   Kowalski splints removed, blood clot suctioned from right middle meatus. No active bleeding. Septum midline.     A/P: Splints removed today  ER visit yesterday with CT for vision concerns post op. Doing much better today. Double vision resolving. Supect edema pushing on lamina.   Has not restarted plavix.   On 7 days of Augmentin and 3 days of prednisone per ER/Mary discussion.   Complete meds, restart plavix. No nose blowing, no straining. Do rinses.   Follow up end of week as planned with Mary.     Verónica Wiggins MD

## 2023-06-13 ENCOUNTER — OFFICE VISIT (OUTPATIENT)
Dept: FAMILY MEDICINE | Facility: CLINIC | Age: 78
End: 2023-06-13
Payer: MEDICARE

## 2023-06-13 VITALS
HEIGHT: 70 IN | SYSTOLIC BLOOD PRESSURE: 120 MMHG | WEIGHT: 202.94 LBS | OXYGEN SATURATION: 97 % | BODY MASS INDEX: 29.05 KG/M2 | DIASTOLIC BLOOD PRESSURE: 70 MMHG | HEART RATE: 73 BPM

## 2023-06-13 DIAGNOSIS — R79.89 ELEVATED LFTS: Primary | ICD-10-CM

## 2023-06-13 DIAGNOSIS — M50.30 DEGENERATIVE DISC DISEASE, CERVICAL: ICD-10-CM

## 2023-06-13 DIAGNOSIS — C91.10 CLL (CHRONIC LYMPHOCYTIC LEUKEMIA): ICD-10-CM

## 2023-06-13 DIAGNOSIS — R13.10 DYSPHAGIA, UNSPECIFIED TYPE: ICD-10-CM

## 2023-06-13 DIAGNOSIS — N18.31 STAGE 3A CHRONIC KIDNEY DISEASE: ICD-10-CM

## 2023-06-13 DIAGNOSIS — E03.9 ACQUIRED HYPOTHYROIDISM: ICD-10-CM

## 2023-06-13 PROCEDURE — 99214 OFFICE O/P EST MOD 30 MIN: CPT | Mod: S$PBB,,, | Performed by: INTERNAL MEDICINE

## 2023-06-13 PROCEDURE — 99999 PR PBB SHADOW E&M-EST. PATIENT-LVL V: ICD-10-PCS | Mod: PBBFAC,,, | Performed by: INTERNAL MEDICINE

## 2023-06-13 PROCEDURE — 99214 PR OFFICE/OUTPT VISIT, EST, LEVL IV, 30-39 MIN: ICD-10-PCS | Mod: S$PBB,,, | Performed by: INTERNAL MEDICINE

## 2023-06-13 PROCEDURE — 99215 OFFICE O/P EST HI 40 MIN: CPT | Mod: PBBFAC,PO | Performed by: INTERNAL MEDICINE

## 2023-06-13 PROCEDURE — 99999 PR PBB SHADOW E&M-EST. PATIENT-LVL V: CPT | Mod: PBBFAC,,, | Performed by: INTERNAL MEDICINE

## 2023-06-13 NOTE — PROGRESS NOTES
Subjective     Eligio Richardson is a 78 y.o. old, male here for Hypertension (Liver levels are high )    77 y/o with PMH of CAD, CLL, prostate ca followed by MD linares, BPH, CKD stage 3, hypothyroidism, CLBP    Patient is here today with his wife.  He recently had a very painful sinus surgery with significant post-op swelling and went to the ED. LFT's were significantly elevated.  Prior h/o thrombocytopenia - now back down to where they were several years ago.  No recent travel, new meds (other than trying gabapentin again), or illness. He is on augmentin but had the elevated LFT's prior. No recent imaging or symptoms to suggest biliary colic or pancreatic issues. No supplements other than probiotic.  He has plans to get esophageal dilatation again and has plans to f/u with hematology for his CLL    Review of Systems   Constitutional: Negative.    HENT:  Positive for congestion.    Skin: Negative.    Neurological:  Positive for headaches.   Medications     Outpatient Medications Marked as Taking for the 6/13/23 encounter (Office Visit) with Acosta Corley MD   Medication Sig Dispense Refill    aluminum hydrox-magnesium carb 254-237.5 mg/5 mL Susp Take 10 mLs by mouth daily as needed (stomach upset).       amoxicillin-clavulanate 875-125mg (AUGMENTIN) 875-125 mg per tablet Take 1 tablet by mouth 2 (two) times daily. for 7 days 14 tablet 0    aspirin 81 MG Chew Take 81 mg by mouth every other day.      carvediloL (COREG) 6.25 MG tablet Take 1 tablet (6.25 mg total) by mouth 2 (two) times daily with meals. 180 tablet 0    ciprofloxacin HCl (CIPRO) 500 MG tablet Take 500 mg by mouth every 12 (twelve) hours.      clopidogreL (PLAVIX) 75 mg tablet Take 1 tablet (75 mg total) by mouth once daily. 30 tablet 11    finasteride (PROSCAR) 5 mg tablet Take 5 mg by mouth once daily.      fluorouraciL (EFUDEX) 5 % cream Apply topically 2 (two) times daily. For 2 weeks. 40 g 1    fluticasone propionate (FLONASE) 50  mcg/actuation nasal spray 1 spray by Each Nostril route 2 (two) times daily.      hydroCHLOROthiazide (HYDRODIURIL) 12.5 MG Tab Take 25 mg by mouth once daily.      HYDROcodone-acetaminophen (NORCO) 5-325 mg per tablet Take 1 tablet by mouth every 4 (four) hours as needed for Pain. 15 tablet 0    L gasseri/B bifidum/B longum (PROBIOTIC COLON CARE ORAL) Take 1 tablet by mouth once daily.      levothyroxine (SYNTHROID) 88 MCG tablet Take 1 tablet (88 mcg total) by mouth once daily. 90 tablet 3    mag/aluminum/sod bicarb/alginc (GAVISCON ORAL) Take by mouth.      meloxicam (MOBIC) 15 MG tablet Take 1 tablet (15 mg total) by mouth once daily. 90 tablet 3    montelukast (SINGULAIR) 10 mg tablet Take 1 tablet (10 mg total) by mouth once daily. 90 tablet 1    mupirocin (BACTROBAN) 2 % ointment by Nasal route once daily. 22 g 2    olmesartan (BENICAR) 5 MG Tab Take 1 tablet (5 mg total) by mouth every evening. (Patient taking differently: Take 5 mg by mouth every evening. Half of pill) 90 tablet 0    ondansetron (ZOFRAN-ODT) 4 MG TbDL Take 1 tablet (4 mg total) by mouth every 6 (six) hours as needed (Nausea). 10 tablet 0    oxybutynin (DITROPAN-XL) 10 MG 24 hr tablet Take 1 tablet (10 mg total) by mouth once daily. 90 tablet 3    oxyCODONE (ROXICODONE) 5 MG immediate release tablet Take 1 tablet (5 mg total) by mouth every 6 (six) hours as needed for Pain. 20 tablet 0    pantoprazole (PROTONIX) 40 MG tablet Take 1 tablet twice a day by oral route in the morning for 90 days.      predniSONE (DELTASONE) 20 MG tablet Take 2 tablets (40 mg total) by mouth once daily. for 3 days 6 tablet 0    rosuvastatin (CRESTOR) 10 MG tablet Take 1 tablet (10 mg total) by mouth every evening. 90 tablet 1    tamsulosin (FLOMAX) 0.4 mg Cap Take 1 capsule (0.4 mg total) by mouth 2 (two) times a day. 180 capsule 3    [DISCONTINUED] gabapentin (NEURONTIN) 300 MG capsule Take 1 capsule (300 mg total) by mouth every evening. 90 capsule 3  "    Objective     /70   Pulse 73   Ht 5' 10" (1.778 m)   Wt 92 kg (202 lb 14.9 oz)   SpO2 97%   BMI 29.12 kg/m²   Physical Exam  Constitutional:       General: He is not in acute distress.     Appearance: Normal appearance. He is well-developed.   Neurological:      Mental Status: He is alert.     Assessment and Plan     Elevated LFTs  -     US Abdomen Complete; Future; Expected date: 06/13/2023  -     Comprehensive Metabolic Panel; Future; Expected date: 06/13/2023  -     Ferritin; Future; Expected date: 06/13/2023  -     CBC Auto Differential; Future; Expected date: 06/13/2023  -     Comprehensive Metabolic Panel; Future; Expected date: 06/13/2023    Acquired hypothyroidism  -     TSH; Future; Expected date: 06/13/2023    Degenerative disc disease, cervical    Dysphagia, unspecified type    CLL (chronic lymphocytic leukemia)    Stage 3a chronic kidney disease    No obvious etiology for his elevated LFT's, will plan to repeat with additional labwork. Imaging with CT scan later if needed.    No follow-ups on file.  ___________________  Acosta Corley MD  Internal Medicine and Pediatrics  "

## 2023-06-14 ENCOUNTER — PATIENT MESSAGE (OUTPATIENT)
Dept: AUDIOLOGY | Facility: CLINIC | Age: 78
End: 2023-06-14
Payer: MEDICARE

## 2023-06-14 LAB
FINAL PATHOLOGIC DIAGNOSIS: NORMAL
GROSS: NORMAL
Lab: NORMAL

## 2023-06-16 ENCOUNTER — OFFICE VISIT (OUTPATIENT)
Dept: OTOLARYNGOLOGY | Facility: CLINIC | Age: 78
End: 2023-06-16
Payer: MEDICARE

## 2023-06-16 ENCOUNTER — HOSPITAL ENCOUNTER (OUTPATIENT)
Dept: RADIOLOGY | Facility: HOSPITAL | Age: 78
Discharge: HOME | End: 2023-06-16
Attending: INTERNAL MEDICINE
Payer: MEDICARE

## 2023-06-16 DIAGNOSIS — J32.4 CHRONIC PANSINUSITIS: Primary | ICD-10-CM

## 2023-06-16 DIAGNOSIS — R79.89 ELEVATED LFTS: ICD-10-CM

## 2023-06-16 DIAGNOSIS — J34.89 NASAL CRUSTING: ICD-10-CM

## 2023-06-16 PROCEDURE — 87186 SC STD MICRODIL/AGAR DIL: CPT | Mod: 59 | Performed by: OTOLARYNGOLOGY

## 2023-06-16 PROCEDURE — 99499 UNLISTED E&M SERVICE: CPT | Mod: S$PBB,,, | Performed by: OTOLARYNGOLOGY

## 2023-06-16 PROCEDURE — 99999 PR PBB SHADOW E&M-EST. PATIENT-LVL I: ICD-10-PCS | Mod: PBBFAC,,, | Performed by: OTOLARYNGOLOGY

## 2023-06-16 PROCEDURE — 87077 CULTURE AEROBIC IDENTIFY: CPT | Performed by: OTOLARYNGOLOGY

## 2023-06-16 PROCEDURE — 87070 CULTURE OTHR SPECIMN AEROBIC: CPT | Performed by: OTOLARYNGOLOGY

## 2023-06-16 PROCEDURE — 76700 US EXAM ABDOM COMPLETE: CPT | Mod: TC,PO

## 2023-06-16 PROCEDURE — 99999 PR PBB SHADOW E&M-EST. PATIENT-LVL I: CPT | Mod: PBBFAC,,, | Performed by: OTOLARYNGOLOGY

## 2023-06-16 PROCEDURE — 99499 NO LOS: ICD-10-PCS | Mod: S$PBB,,, | Performed by: OTOLARYNGOLOGY

## 2023-06-16 PROCEDURE — 76700 US ABDOMEN COMPLETE: ICD-10-PCS | Mod: 26,,, | Performed by: RADIOLOGY

## 2023-06-16 PROCEDURE — 31237 NSL/SINS NDSC SURG BX POLYPC: CPT | Mod: PBBFAC,50,79,PO | Performed by: OTOLARYNGOLOGY

## 2023-06-16 PROCEDURE — 99211 OFF/OP EST MAY X REQ PHY/QHP: CPT | Mod: PBBFAC,PO,25 | Performed by: OTOLARYNGOLOGY

## 2023-06-16 PROCEDURE — 76700 US EXAM ABDOM COMPLETE: CPT | Mod: 26,,, | Performed by: RADIOLOGY

## 2023-06-16 PROCEDURE — 31237 NSL/SINS NDSC SURG BX POLYPC: CPT | Mod: 50,79,S$PBB, | Performed by: OTOLARYNGOLOGY

## 2023-06-16 PROCEDURE — 31237 PR NASAL/SINUS ENDOSCOPY,BX/RMV POLYP/DEBRID: ICD-10-PCS | Mod: 50,79,S$PBB, | Performed by: OTOLARYNGOLOGY

## 2023-06-16 NOTE — PROGRESS NOTES
Here for post-operative visit #2   S/p     Procedures performed  Septoplasty  Bilateral Maxillary Antrostomy with removal of tissue  Bilateral Total Ethmoidectomy  Bilateral Sphenoid Sinusotomy with removal of tissue  Stereotactic Navigation for 2 hours    Had HA since surgery  Had a lot of eye swelling and pressure POD 2. I sent him to ER to have CT and this was wnl. This was related to pressure from packing.  Splints out earlier this week  No bleeding  Resumed Plavix    Nasal Endoscopy with Debridement    Nasal endoscopy with debridement was performed in accordance with the 0 day global period for endoscopic sinus surgery and are unrelated to any other recently performed procedures.     Topical Agents: Topical 0.25% phenylephrine and 4% lidocaine    A 30 degree rigid nasal endoscope was inserted into the nose to visualize the nasal passageway and paranasal sinuses. Under endoscopic visualization, a combination of instruments including suction and grasping forceps were used to debride crust, debris, inflammatory tissue and nasal polyps from the nasal cavity, paranasal sinuses and sinus drainage pathways. This was performed to aid in healing and optimize the patency and function of the sinus cavities and nasal passageways. This is necessary to avoid scar formation, infection, and mucocele formation. In addition, this facilitates in the optimal instillation of topical therapies, saline irrigations, long-term disease surveillance, and endoscopically-derived cultures. Examination of the inferior turbinates, middle turbinates, middle meatus, operated sinuses, sphenoethmoidal recess, and nasopharynx was undertaken. All findings were normal for this stage in healing and included:    LEFT: purulence in max, cultured. Clot in ethmoid. Widely patent max, ethmoid, and sphenoid. Mod soft septal edema without pain.  RIGHT: mild purulence. Mild crusting. Widely patent max, ethmoid, and sphenoid. Mod soft septal edema without  pain.    Assessment & Plan:  - Culture taken - will treat based on result  - Continue irrigations  - OK to restart previous nasal medications   - Follow up 7 days for repeat examination

## 2023-06-19 DIAGNOSIS — J32.4 CHRONIC PANSINUSITIS: Primary | ICD-10-CM

## 2023-06-19 LAB
BACTERIA SPEC AEROBE CULT: ABNORMAL
BACTERIA SPEC AEROBE CULT: ABNORMAL

## 2023-06-19 RX ORDER — LEVOFLOXACIN 500 MG/1
500 TABLET, FILM COATED ORAL DAILY
Qty: 10 TABLET | Refills: 0 | Status: SHIPPED | OUTPATIENT
Start: 2023-06-19 | End: 2023-06-29

## 2023-06-23 ENCOUNTER — OFFICE VISIT (OUTPATIENT)
Dept: OTOLARYNGOLOGY | Facility: CLINIC | Age: 78
End: 2023-06-23
Payer: MEDICARE

## 2023-06-23 VITALS — HEIGHT: 70 IN | BODY MASS INDEX: 29.03 KG/M2 | WEIGHT: 202.81 LBS

## 2023-06-23 DIAGNOSIS — J32.4 CHRONIC PANSINUSITIS: Primary | ICD-10-CM

## 2023-06-23 DIAGNOSIS — J34.89 NASAL CRUSTING: ICD-10-CM

## 2023-06-23 PROCEDURE — 31237 NSL/SINS NDSC SURG BX POLYPC: CPT | Mod: PBBFAC,50,PO | Performed by: OTOLARYNGOLOGY

## 2023-06-23 PROCEDURE — 99499 NO LOS: ICD-10-PCS | Mod: S$PBB,,, | Performed by: OTOLARYNGOLOGY

## 2023-06-23 PROCEDURE — 31237 NSL/SINS NDSC SURG BX POLYPC: CPT | Mod: 79,50,S$PBB, | Performed by: OTOLARYNGOLOGY

## 2023-06-23 PROCEDURE — 31237 PR NASAL/SINUS ENDOSCOPY,BX/RMV POLYP/DEBRID: ICD-10-PCS | Mod: 79,50,S$PBB, | Performed by: OTOLARYNGOLOGY

## 2023-06-23 PROCEDURE — 99499 UNLISTED E&M SERVICE: CPT | Mod: S$PBB,,, | Performed by: OTOLARYNGOLOGY

## 2023-06-23 PROCEDURE — 99213 OFFICE O/P EST LOW 20 MIN: CPT | Mod: PBBFAC,25,PO | Performed by: OTOLARYNGOLOGY

## 2023-06-23 PROCEDURE — 99999 PR PBB SHADOW E&M-EST. PATIENT-LVL III: ICD-10-PCS | Mod: PBBFAC,,, | Performed by: OTOLARYNGOLOGY

## 2023-06-23 PROCEDURE — 99999 PR PBB SHADOW E&M-EST. PATIENT-LVL III: CPT | Mod: PBBFAC,,, | Performed by: OTOLARYNGOLOGY

## 2023-06-25 ENCOUNTER — PATIENT MESSAGE (OUTPATIENT)
Dept: OTOLARYNGOLOGY | Facility: CLINIC | Age: 78
End: 2023-06-25
Payer: MEDICARE

## 2023-06-27 ENCOUNTER — TELEPHONE (OUTPATIENT)
Dept: CARDIOLOGY | Facility: CLINIC | Age: 78
End: 2023-06-27
Payer: MEDICARE

## 2023-06-27 NOTE — TELEPHONE ENCOUNTER
[facility]X [facility] / DR. WHITE REQUESTING CLEARANCE FOR CERVICAL PRANAY AND TO HOLD PLAVIX AND ASA X 7 DAYS PRIOR  THX

## 2023-07-03 NOTE — PROGRESS NOTES
Here for post-operative visit #3   S/p     Procedures performed  Septoplasty  Bilateral Maxillary Antrostomy with removal of tissue  Bilateral Total Ethmoidectomy  Bilateral Sphenoid Sinusotomy with removal of tissue  Stereotactic Navigation for 2 hours    Still with BUCHANAN. Located in temporal / frontal region.  No bleeding    Nasal Endoscopy with Debridement    Nasal endoscopy with debridement was performed in accordance with the 0 day global period for endoscopic sinus surgery and are unrelated to any other recently performed procedures.     Topical Agents: Topical 0.25% phenylephrine and 4% lidocaine    A 30 degree rigid nasal endoscope was inserted into the nose to visualize the nasal passageway and paranasal sinuses. Under endoscopic visualization, a combination of instruments including suction and grasping forceps were used to debride crust, debris, inflammatory tissue and nasal polyps from the nasal cavity, paranasal sinuses and sinus drainage pathways. This was performed to aid in healing and optimize the patency and function of the sinus cavities and nasal passageways. This is necessary to avoid scar formation, infection, and mucocele formation. In addition, this facilitates in the optimal instillation of topical therapies, saline irrigations, long-term disease surveillance, and endoscopically-derived cultures. Examination of the inferior turbinates, middle turbinates, middle meatus, operated sinuses, sphenoethmoidal recess, and nasopharynx was undertaken. All findings were normal for this stage in healing and included:    LEFT: persistent mild purulence in max and ethmoid. Mod soft septal edema without pain. Patent sinuses  RIGHT: scant purulence in max and ethmoid. Mod soft septal edema without pain. Patent sinuses      Susceptibility     Citrobacter koseri Pseudomonas aeruginosa     CULTURE, AEROBIC  (SPECIFY SOURCE) CULTURE, AEROBIC  (SPECIFY SOURCE)     Amikacin   <=16 mcg/mL Sensitive     Amox/K Clav'ate  <=8/4 mcg/mL Sensitive       Amp/Sulbactam <=8/4 mcg/mL Sensitive       Cefazolin <=2 mcg/mL Sensitive       Cefepime <=2 mcg/mL Sensitive <=2 mcg/mL Sensitive     Ceftriaxone <=1 mcg/mL Sensitive       Ciprofloxacin <=1 mcg/mL Sensitive <=1 mcg/mL Sensitive     Ertapenem <=0.5 mcg/mL Sensitive       Gentamicin <=4 mcg/mL Sensitive <=4 mcg/mL Sensitive     Levofloxacin <=2 mcg/mL Sensitive <=2 mcg/mL Sensitive     Meropenem <=1 mcg/mL Sensitive <=1 mcg/mL Sensitive     Piperacillin/Tazo <=16 mcg/mL Sensitive <=16 mcg/mL Sensitive     Tetracycline <=4 mcg/mL Sensitive       Tobramycin <=4 mcg/mL Sensitive <=4 mcg/mL Sensitive     Trimeth/Sulfa <=2/38 mcg/mL Sensitive                    Assessment & Plan:  - Culture as above  - Will plan on topical irrigations to try and clear  - RTC 1 mo

## 2023-07-07 ENCOUNTER — PATIENT MESSAGE (OUTPATIENT)
Dept: OTOLARYNGOLOGY | Facility: CLINIC | Age: 78
End: 2023-07-07
Payer: MEDICARE

## 2023-07-11 ENCOUNTER — PATIENT MESSAGE (OUTPATIENT)
Dept: FAMILY MEDICINE | Facility: CLINIC | Age: 78
End: 2023-07-11
Payer: MEDICARE

## 2023-07-11 ENCOUNTER — PATIENT MESSAGE (OUTPATIENT)
Dept: CARDIOLOGY | Facility: CLINIC | Age: 78
End: 2023-07-11
Payer: MEDICARE

## 2023-07-12 ENCOUNTER — PATIENT MESSAGE (OUTPATIENT)
Dept: FAMILY MEDICINE | Facility: CLINIC | Age: 78
End: 2023-07-12
Payer: MEDICARE

## 2023-07-12 ENCOUNTER — TELEPHONE (OUTPATIENT)
Dept: CARDIOLOGY | Facility: CLINIC | Age: 78
End: 2023-07-12
Payer: MEDICARE

## 2023-07-12 NOTE — TELEPHONE ENCOUNTER
----- Message from Leonor Costa sent at 7/12/2023  8:30 AM CDT -----  Contact: Patient's wife  Type: Needs Medical Advice    Who Called:  Patient's wife  What is this regarding?:  Pt was supposed to have some labs but he doesn't have any orders for it yet.  Best Call Back Number:  [phone]  Additional Information:  Please call the patient's wife back at the phone number listed above to advise. Thank you!

## 2023-07-17 ENCOUNTER — OFFICE VISIT (OUTPATIENT)
Dept: OTOLARYNGOLOGY | Facility: CLINIC | Age: 78
End: 2023-07-17
Payer: MEDICARE

## 2023-07-17 VITALS — HEIGHT: 70 IN | WEIGHT: 202.81 LBS | BODY MASS INDEX: 29.03 KG/M2

## 2023-07-17 DIAGNOSIS — J32.4 CHRONIC PANSINUSITIS: Primary | ICD-10-CM

## 2023-07-17 PROCEDURE — 31231 PR NASAL ENDOSCOPY, DX: ICD-10-PCS | Mod: 79,S$PBB,, | Performed by: OTOLARYNGOLOGY

## 2023-07-17 PROCEDURE — 99213 OFFICE O/P EST LOW 20 MIN: CPT | Mod: PBBFAC,PO | Performed by: OTOLARYNGOLOGY

## 2023-07-17 PROCEDURE — 99999 PR PBB SHADOW E&M-EST. PATIENT-LVL III: CPT | Mod: PBBFAC,,, | Performed by: OTOLARYNGOLOGY

## 2023-07-17 PROCEDURE — 31231 NASAL ENDOSCOPY DX: CPT | Mod: PBBFAC,PO | Performed by: OTOLARYNGOLOGY

## 2023-07-17 PROCEDURE — 99999 PR PBB SHADOW E&M-EST. PATIENT-LVL III: ICD-10-PCS | Mod: PBBFAC,,, | Performed by: OTOLARYNGOLOGY

## 2023-07-17 PROCEDURE — 31231 NASAL ENDOSCOPY DX: CPT | Mod: 79,S$PBB,, | Performed by: OTOLARYNGOLOGY

## 2023-07-17 PROCEDURE — 99499 UNLISTED E&M SERVICE: CPT | Mod: S$PBB,,, | Performed by: OTOLARYNGOLOGY

## 2023-07-17 PROCEDURE — 99499 NO LOS: ICD-10-PCS | Mod: S$PBB,,, | Performed by: OTOLARYNGOLOGY

## 2023-07-18 NOTE — PROGRESS NOTES
Here for post-operative visit #4   S/p     Procedures performed  Septoplasty  Bilateral Maxillary Antrostomy with removal of tissue  Bilateral Total Ethmoidectomy  Bilateral Sphenoid Sinusotomy with removal of tissue  Stereotactic Navigation for 2 hours    Still with BUCHANAN. Located in temporal / frontal region.  Seeing someone soon for injection (cervical)    Nasal Endoscopy    Nasal endoscopy with debridement was performed in accordance with the 0 day global period for endoscopic sinus surgery and are unrelated to any other recently performed procedures.     Topical Agents: Topical 0.25% phenylephrine and 4% lidocaine    A 30 degree rigid nasal endoscope was inserted into the nose to visualize the nasal passageway and paranasal sinuses. Under endoscopic visualization, a combination of instruments including suction and grasping forceps were used to debride crust, debris, inflammatory tissue and nasal polyps from the nasal cavity, paranasal sinuses and sinus drainage pathways. This was performed to aid in healing and optimize the patency and function of the sinus cavities and nasal passageways. This is necessary to avoid scar formation, infection, and mucocele formation. In addition, this facilitates in the optimal instillation of topical therapies, saline irrigations, long-term disease surveillance, and endoscopically-derived cultures. Examination of the inferior turbinates, middle turbinates, middle meatus, operated sinuses, sphenoethmoidal recess, and nasopharynx was undertaken. All findings were normal for this stage in healing and included:    LEFT: Patent max, ethmoid - resolution of purulence. Clean sinuses with no edema  RIGHT: Patent max, ethmoid - resolution of purulence. Clean sinuses with no edema      Assessment & Plan:  - Improved  - Continue rinses prn. Can stop topical antibiotics  RTC 6 mos, sooner prn

## 2023-07-25 NOTE — PROGRESS NOTES
Subjective:    Patient ID:  Eligio Richardson is a 78 y.o. male who presents for Coronary artery disease involving native coronary artery of and Hypertension        HPI  RECENT LABS LFTS NORMAL GFR 76, LABS IN [place], MG 2.2, , HB 12.8, , , PSA UP, TO F/U SOON, LEUKEMIA STABLE, OVERALL DOING OK, OCC ORTHOSTASIS, OCC ANGINA, SEE ROS    Past Medical History:   Diagnosis Date    Anticoagulant long-term use     aspirin and plavix    Cancer     skin cancer to face    Cholelithiasis     Constipation, chronic     severe    Coronary artery disease     mitral valve regurgitation, aortic stenosis    Degenerative disc disease, cervical     Gallstones     GERD (gastroesophageal reflux disease)     Hiatal hernia     Hypothyroidism     Inguinal hernia without mention of obstruction or gangrene, unilateral or unspecified, (not specified as recurrent)     Insomnia     Irregular heart beat     currently not an issue and not on any meds for it    Osteoarthritis     Renal disorder     CKD Stage 3a    Sleep apnea     does not use CPAP; wife denies     Past Surgical History:   Procedure Laterality Date    ANGIOGRAM, CORONARY, WITH LEFT HEART CATHETERIZATION N/A 10/18/2022    Procedure: Angiogram, Coronary, with Left Heart Cath;  Surgeon: Joaquin Freedman MD;  Location: [facility] CATH;  Service: Cardiology;  Laterality: N/A;    CHOLECYSTECTOMY      COLONOSCOPY  2013    Dr. Gee    COLONOSCOPY N/A 06/06/2016    Procedure: COLONOSCOPY;  Surgeon: Ricky Arriola MD;  Location: [facility] ENDO;  Service: Endoscopy;  Laterality: N/A;    COLONOSCOPY  07/15/2021    Dr. Hawk    CYSTOSCOPY N/A 06/18/2018    Procedure: CYSTOSCOPY;  Surgeon: Andry Paz MD;  Location: [facility] OR;  Service: Urology;  Laterality: N/A;    CYSTOURETHROSCOPY  10/2019    EGD, WITH BALLOON DILATION N/A 09/21/2021    ELBOW ARTHROPLASTY      EYE SURGERY Bilateral     cataract    FRACTURE SURGERY Right     knee     FUNCTIONAL ENDOSCOPIC SINUS SURGERY (FESS) USING  "COMPUTER-ASSISTED NAVIGATION Bilateral 6/9/2023    Procedure: SINUS SURGERY FUNCTIONAL ENDOSCOPIC WITH NAVIGATION;  Surgeon: Dick Hernandez MD;  Location: [facility] OR;  Service: ENT;  Laterality: Bilateral;    HIATAL HERNIA REPAIR  2013    [facility]    KNEE ARTHROSCOPY Right     NASAL SEPTOPLASTY Bilateral 6/9/2023    Procedure: SEPTOPLASTY;  Surgeon: Dick Hernandez MD;  Location: [facility] OR;  Service: ENT;  Laterality: Bilateral;    neck injection  10/2019    NECK SURGERY      prostate biospy      normal per pt    SHOULDER SURGERY Bilateral     SINUS SURGERY  01/2019    [facility]     TONSILLECTOMY      TRANSRECTAL BIOPSY OF PROSTATE WITH ULTRASOUND GUIDANCE N/A 06/18/2018    Procedure: BIOPSY, PROSTATE, TRANSRECTAL APPROACH, WITH US GUIDANCE;  Surgeon: Andry Paz MD;  Location: [facility] OR;  Service: Urology;  Laterality: N/A;    UPPER GASTROINTESTINAL ENDOSCOPY  ~2013     Family History   Problem Relation Age of Onset    Lung cancer Mother     Skin cancer Father     Lung cancer Father     Prostate cancer Maternal Uncle     Prostate cancer Paternal Uncle     Colon cancer Neg Hx     Colon polyps Neg Hx     Esophageal cancer Neg Hx     Stomach cancer Neg Hx     Liver cancer Neg Hx     Crohn's disease Neg Hx     Inflammatory bowel disease Neg Hx     Irritable bowel syndrome Neg Hx      Social History     Socioeconomic History    Marital status:    Occupational History    Occupation: retired, worked at paper [employer]     Comment: [place]    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 , Disp: , Rfl:     aspirin 81 MG Chew, Take 81 mg by mouth every other day., Disp: , Rfl:     carvediloL (COREG) 6.25 MG tablet, Take 1 tablet (6.25 mg total) by mouth 2 (two) times daily with meals., Disp: 180 tablet, Rfl: 0    clopidogreL (PLAVIX) 75 mg tablet, Take 1 tablet (75 mg total) by mouth once daily., Disp: 30 tablet, Rfl: 11    finasteride (PROSCAR) 5 mg tablet, Take 5 mg by mouth once daily., Disp: , Rfl:     fluorouraciL (EFUDEX) 5 % cream, Apply topically 2 (two) times daily. For 2 weeks., Disp: 40 g, Rfl: 1    fluticasone propionate (FLONASE) 50 mcg/actuation nasal spray, 1 spray by Each Nostril route 2 (two) times daily., Disp: , Rfl:     hydroCHLOROthiazide (HYDRODIURIL) 12.5 MG Tab, Take 25 mg by mouth once daily., Disp: , Rfl:     HYDROcodone-acetaminophen (NORCO) 5-325 mg per tablet, Take 1 tablet by mouth every 4 (four) hours as needed for Pain., Disp: 15 tablet, Rfl: 0    L gasseri/B bifidum/B longum (PROBIOTIC COLON CARE ORAL), Take 1 tablet by mouth once daily., Disp: , Rfl:     levothyroxine (SYNTHROID) 88 MCG tablet, Take 1 tablet (88 mcg total) by mouth once daily., Disp: 90 tablet, Rfl: 3    mag/aluminum/sod bicarb/alginc (GAVISCON ORAL), Take by mouth., Disp: , Rfl:     meloxicam (MOBIC) 15 MG tablet, Take 1 tablet (15 mg total) by mouth once daily., Disp: 90 tablet, Rfl: 3    montelukast (SINGULAIR) 10 mg tablet, Take 1 tablet (10 mg total) by mouth once daily., Disp: 90 tablet, Rfl: 1    mupirocin (BACTROBAN) 2 % ointment, by Nasal route once daily., Disp: 22 g, Rfl: 2    ondansetron (ZOFRAN-ODT) 4 MG TbDL, Take 1 tablet (4 mg total) by mouth every 6 (six) hours as needed (Nausea)., Disp: 10 tablet, Rfl: 0    oxybutynin (DITROPAN-XL) 10 MG 24 hr tablet, Take 1 tablet (10 mg total) by mouth once daily., Disp: 90 tablet, Rfl: 3    pantoprazole (PROTONIX) 40 MG tablet, Take 1 tablet twice  "a day by oral route in the morning for 90 days., Disp: , Rfl:     tamsulosin (FLOMAX) 0.4 mg Cap, Take 1 capsule (0.4 mg total) by mouth 2 (two) times a day., Disp: 180 capsule, Rfl: 3    rosuvastatin (CRESTOR) 10 MG tablet, Take 1 tablet (10 mg total) by mouth every evening., Disp: 90 tablet, Rfl: 1    Review of Systems   Constitutional: Negative for chills, diaphoresis, fever, malaise/fatigue, night sweats and weight loss.   HENT:  Negative for congestion and nosebleeds.    Eyes:  Negative for blurred vision and redness.   Cardiovascular:  Negative for chest pain, claudication, cyanosis, dyspnea on exertion (MILD), irregular heartbeat, leg swelling, near-syncope, orthopnea, palpitations, paroxysmal nocturnal dyspnea and syncope.   Respiratory:  Negative for cough (OCC), hemoptysis, shortness of breath and wheezing.    Endocrine: Negative for cold intolerance and heat intolerance.   Hematologic/Lymphatic: Negative for adenopathy. Bruises/bleeds easily.        LEUKEMIA, PROSTATE CANCER, STABLE   Skin:  Negative for color change and itching.   Musculoskeletal:  Negative for back pain (MILD,CHRONIC, HYDROCODONE) and falls. Muscle weakness: R FOOT.  Gastrointestinal:  Negative for abdominal pain, change in bowel habit, dysphagia, jaundice, melena and nausea.   Genitourinary:  Negative for dysuria and flank pain.   Neurological:  Positive for light-headedness. Negative for brief paralysis, focal weakness, loss of balance, numbness and weakness.   Psychiatric/Behavioral:  Negative for altered mental status and depression.    Allergic/Immunologic: Negative for persistent infections.      Objective:      Vitals:    07/26/23 0942   BP: 108/62   Pulse: 66   Weight: 90.8 kg (200 lb 2.8 oz)   Height: 5' 10" (1.778 m)   PainSc: 0-No pain     Body mass index is 28.72 kg/m².    Physical Exam  Constitutional:       Appearance: Normal appearance.   HENT:      Head: Normocephalic and atraumatic.   Eyes:      Extraocular Movements: " Extraocular movements intact.      Pupils: Pupils are equal, round, and reactive to light.   Cardiovascular:      Rate and Rhythm: Normal rate and regular rhythm. No extrasystoles are present.     Pulses: Normal pulses.           Carotid pulses are 2+ on the right side and 2+ on the left side.       Radial pulses are 2+ on the right side and 2+ on the left side.        Posterior tibial pulses are 2+ on the right side and 2+ on the left side.      Heart sounds: Murmur heard.   Harsh systolic murmur is present with a grade of 2/6 at the upper right sternal border.     No friction rub. No gallop.   Pulmonary:      Effort: Pulmonary effort is normal.      Breath sounds: Normal breath sounds. No rales.   Abdominal:      Palpations: Abdomen is soft.      Tenderness: There is no abdominal tenderness.   Musculoskeletal:      Cervical back: Neck supple.      Right lower leg: No edema.      Left lower leg: No edema.   Skin:     General: Skin is warm and dry.      Capillary Refill: Capillary refill takes less than 2 seconds.   Neurological:      General: No focal deficit present.      Mental Status: He is alert and oriented to person, place, and time.      Cranial Nerves: Cranial nerve deficit: HEARING AIDS.   Psychiatric:         Mood and Affect: Mood normal.         Speech: Speech normal.         Behavior: Behavior normal.               ..    Chemistry        Component Value Date/Time     06/11/2023 1245    K 3.7 06/11/2023 1245     06/11/2023 1245    CO2 30 06/11/2023 1245    BUN 18 06/11/2023 1245    CREATININE 1.01 07/11/2023 0830    CREATININE 1.02 06/11/2023 1245     (H) 06/11/2023 1245        Component Value Date/Time    CALCIUM 9.0 06/11/2023 1245    ALKPHOS 92 06/11/2023 1245     (H) 06/11/2023 1245     (H) 06/11/2023 1245    BILITOT 1.6 (H) 06/11/2023 1245    ESTGFRAFRICA >60 12/07/2020 0905    EGFRNONAA >60 12/07/2020 0905            ..  Lab Results   Component Value Date    CHOL  117 (L) 03/02/2023    CHOL 154 12/07/2020    CHOL 149 10/19/2018     Lab Results   Component Value Date    HDL 56 03/02/2023    HDL 51 12/07/2020    HDL 52 10/19/2018     Lab Results   Component Value Date    LDLCALC 47.4 (L) 03/02/2023    LDLCALC 88.8 12/07/2020    LDLCALC 83.8 10/19/2018     Lab Results   Component Value Date    TRIG 68 03/02/2023    TRIG 71 12/07/2020    TRIG 66 10/19/2018     Lab Results   Component Value Date    CHOLHDL 47.9 03/02/2023    CHOLHDL 33.1 12/07/2020    CHOLHDL 34.9 10/19/2018     ..  Lab Results   Component Value Date    WBC 8.5 07/11/2023    HGB 12.8 (L) 07/11/2023    HCT 38.9 (L) 07/11/2023    MCV 97 07/11/2023     07/11/2023       Test(s) Reviewed  I have reviewed the following in detail:  [] Stress test   [] Angiography   [] Echocardiogram   [x] Labs   [] Other:       Assessment:         ICD-10-CM ICD-9-CM   1. Coronary artery disease involving native coronary artery of native heart without angina pectoris  I25.10 414.01   2. Mitral regurgitation and aortic stenosis  I08.0 396.2   3. Orthostasis  I95.1 458.0   4. Hypercholesterolemia  E78.00 272.0   5. Carotid artery plaque, bilateral  I65.23 433.10     433.30   6. Overweight (BMI 25.0-29.9)  E66.3 278.02   7. Ascending aorta dilatation  I77.810 447.71   8. Primary hypertension  I10 401.9   9. Long term (current) use of antithrombotics/antiplatelets  Z79.02 V58.63     Problem List Items Addressed This Visit          Cardiac/Vascular    Primary hypertension    Mitral regurgitation and aortic stenosis    Relevant Orders    Echo    Ascending aorta dilatation    Carotid artery plaque, bilateral    Hypercholesterolemia    Relevant Orders    Comprehensive Metabolic Panel    Coronary artery disease involving native coronary artery of native heart without angina pectoris - Primary    Relevant Orders    Echo    Comprehensive Metabolic Panel    Orthostasis    Relevant Orders    Comprehensive Metabolic Panel       Hematology     Long term (current) use of antithrombotics/antiplatelets    Relevant Orders    Hemoglobin       Endocrine    Overweight (BMI 25.0-29.9)        Plan:         DC BENICAR, IN VIEW OF MILD ORTHOSTASIS ALSO DECREASE HCTZ TO QOD,,ALL OTHER CV CLINICALLY STABLE, NO ANGINA, NO HF, NO TIA, NO CLINICAL ARRHYTHMIA,CONTINUE CURRENT MEDS, EDUCATION, DIET, EXERCISE , HYDRATION RETURN TO CLINIC IN 6 MO WITH LABS AND ECHO, REASSESS AORTIC STENOSIS SOME VAGUE SYMPTOMS, DISCUSSED PLAN WITH THE PATIENT AND HIS WIFE  Coronary artery disease involving native coronary artery of native heart without angina pectoris  -     Echo; Future; Expected date: 01/26/2024  -     Comprehensive Metabolic Panel; Future; Expected date: 01/26/2024    Mitral regurgitation and aortic stenosis  -     Echo; Future; Expected date: 01/26/2024    Orthostasis  Comments:  ADJUST MEDS  Orders:  -     Comprehensive Metabolic Panel; Future; Expected date: 01/26/2024    Hypercholesterolemia  -     Comprehensive Metabolic Panel; Future; Expected date: 01/26/2024    Carotid artery plaque, bilateral    Overweight (BMI 25.0-29.9)    Ascending aorta dilatation  Comments:  BB    Primary hypertension    Long term (current) use of antithrombotics/antiplatelets  -     Hemoglobin; Future; Expected date: 01/26/2024    Other orders  -     rosuvastatin (CRESTOR) 10 MG tablet; Take 1 tablet (10 mg total) by mouth every evening.  Dispense: 90 tablet; Refill: 1    RTC Low level/low impact aerobic exercise 5x's/wk. Heart healthy diet and risk factor modification.    See labs and med orders.    Aerobic exercise 5x's/wk. Heart healthy diet and risk factor modification.    See labs and med orders.

## 2023-07-26 ENCOUNTER — OFFICE VISIT (OUTPATIENT)
Dept: CARDIOLOGY | Facility: CLINIC | Age: 78
End: 2023-07-26
Payer: MEDICARE

## 2023-07-26 VITALS
BODY MASS INDEX: 28.66 KG/M2 | HEART RATE: 66 BPM | HEIGHT: 70 IN | DIASTOLIC BLOOD PRESSURE: 62 MMHG | WEIGHT: 200.19 LBS | SYSTOLIC BLOOD PRESSURE: 108 MMHG

## 2023-07-26 DIAGNOSIS — I95.1 ORTHOSTASIS: ICD-10-CM

## 2023-07-26 DIAGNOSIS — I65.23 CAROTID ARTERY PLAQUE, BILATERAL: Chronic | ICD-10-CM

## 2023-07-26 DIAGNOSIS — I25.10 CORONARY ARTERY DISEASE INVOLVING NATIVE CORONARY ARTERY OF NATIVE HEART WITHOUT ANGINA PECTORIS: Primary | Chronic | ICD-10-CM

## 2023-07-26 DIAGNOSIS — E78.00 HYPERCHOLESTEROLEMIA: Chronic | ICD-10-CM

## 2023-07-26 DIAGNOSIS — I77.810 ASCENDING AORTA DILATATION: Chronic | ICD-10-CM

## 2023-07-26 DIAGNOSIS — Z79.02 LONG TERM (CURRENT) USE OF ANTITHROMBOTICS/ANTIPLATELETS: Chronic | ICD-10-CM

## 2023-07-26 DIAGNOSIS — E66.3 OVERWEIGHT (BMI 25.0-29.9): Chronic | ICD-10-CM

## 2023-07-26 DIAGNOSIS — I08.0 MITRAL REGURGITATION AND AORTIC STENOSIS: Chronic | ICD-10-CM

## 2023-07-26 DIAGNOSIS — I10 PRIMARY HYPERTENSION: ICD-10-CM

## 2023-07-26 PROCEDURE — 99214 OFFICE O/P EST MOD 30 MIN: CPT | Mod: S$GLB,,, | Performed by: INTERNAL MEDICINE

## 2023-07-26 PROCEDURE — 99214 PR OFFICE/OUTPT VISIT, EST, LEVL IV, 30-39 MIN: ICD-10-PCS | Mod: S$GLB,,, | Performed by: INTERNAL MEDICINE

## 2023-07-26 RX ORDER — ROSUVASTATIN CALCIUM 10 MG/1
10 TABLET, COATED ORAL NIGHTLY
Qty: 90 TABLET | Refills: 1 | Status: SHIPPED | OUTPATIENT
Start: 2023-07-26 | End: 2023-11-29 | Stop reason: SDUPTHER

## 2023-07-27 ENCOUNTER — PATIENT MESSAGE (OUTPATIENT)
Dept: CARDIOLOGY | Facility: CLINIC | Age: 78
End: 2023-07-27
Payer: MEDICARE

## 2023-08-02 ENCOUNTER — PES CALL (OUTPATIENT)
Dept: ADMINISTRATIVE | Facility: CLINIC | Age: 78
End: 2023-08-02
Payer: MEDICARE

## 2023-09-07 RX ORDER — CARVEDILOL 6.25 MG/1
6.25 TABLET ORAL 2 TIMES DAILY WITH MEALS
Qty: 180 TABLET | Refills: 1 | Status: SHIPPED | OUTPATIENT
Start: 2023-09-07 | End: 2023-12-07 | Stop reason: SDUPTHER

## 2023-09-26 DIAGNOSIS — E03.9 ACQUIRED HYPOTHYROIDISM: ICD-10-CM

## 2023-09-26 RX ORDER — LEVOTHYROXINE SODIUM 88 UG/1
88 TABLET ORAL DAILY
Qty: 90 TABLET | Refills: 1 | Status: SHIPPED | OUTPATIENT
Start: 2023-09-26 | End: 2024-01-08 | Stop reason: SDUPTHER

## 2023-09-26 NOTE — TELEPHONE ENCOUNTER
Refill Decision Note   Eligio Richardson  is requesting a refill authorization.  Brief Assessment and Rationale for Refill:  Approve     Medication Therapy Plan:  TSH-WNL      Comments:     Note composed:2:35 PM 09/26/2023             Appointments     Last Visit   6/13/2023 Acosta Corley MD   Next Visit   2/1/2024 Acosta Corley MD

## 2023-09-26 NOTE — TELEPHONE ENCOUNTER
No care due was identified.  [facility] Embedded Care Due Messages. Reference number: [id number].   9/26/2023 2:25:01 PM CDT

## 2023-10-11 DIAGNOSIS — N40.0 BENIGN NON-NODULAR PROSTATIC HYPERPLASIA WITHOUT LOWER URINARY TRACT SYMPTOMS: ICD-10-CM

## 2023-10-11 NOTE — TELEPHONE ENCOUNTER
No care due was identified.  [facility] Embedded Care Due Messages. Reference number: [id number].   10/11/2023 4:58:01 PM CDT

## 2023-10-12 RX ORDER — TAMSULOSIN HYDROCHLORIDE 0.4 MG/1
0.4 CAPSULE ORAL 2 TIMES DAILY
Qty: 180 CAPSULE | Refills: 2 | Status: SHIPPED | OUTPATIENT
Start: 2023-10-12 | End: 2024-02-01 | Stop reason: SDUPTHER

## 2023-10-12 NOTE — TELEPHONE ENCOUNTER
Refill Decision Note      Refill Decision Note   Eligio Richardson  is requesting a refill authorization.  Brief Assessment and Rationale for Refill:  Approve     Medication Therapy Plan:  prostate cancer history 5 years ago      Extended chart review required: Yes   Comments:     Note composed:12:26 PM 10/12/2023             Appointments     Last Visit   6/13/2023 Acosta Corley MD   Next Visit   2/1/2024 Acosta Corley MD

## 2023-10-12 NOTE — TELEPHONE ENCOUNTER
Refill Routing Note   Medication(s) are not appropriate for processing by [facility] for the following reason(s):      Drug-disease interaction: Tamsulosin and Prostate Cancer on problem list    [facility] action(s):  Defer Care Due:  None identified              Appointments  past 12m or future 3m with PCP    Date Provider   Last Visit   6/13/2023 Acosta Corley MD   Next Visit   2/1/2024 Acosta Corley MD   ED visits in past 90 days: 0        Note composed:8:54 PM 10/11/2023

## 2023-10-26 ENCOUNTER — PATIENT MESSAGE (OUTPATIENT)
Dept: CARDIOLOGY | Facility: CLINIC | Age: 78
End: 2023-10-26
Payer: MEDICARE

## 2023-10-26 DIAGNOSIS — I08.0 MITRAL REGURGITATION AND AORTIC STENOSIS: ICD-10-CM

## 2023-10-26 DIAGNOSIS — I10 PRIMARY HYPERTENSION: Primary | ICD-10-CM

## 2023-10-26 DIAGNOSIS — I25.10 CORONARY ARTERY DISEASE INVOLVING NATIVE CORONARY ARTERY OF NATIVE HEART WITHOUT ANGINA PECTORIS: ICD-10-CM

## 2023-10-26 RX ORDER — CLOPIDOGREL BISULFATE 75 MG/1
75 TABLET ORAL DAILY
Qty: 30 TABLET | Refills: 1 | OUTPATIENT
Start: 2023-10-26 | End: 2023-11-03 | Stop reason: SDUPTHER

## 2023-11-03 ENCOUNTER — PATIENT MESSAGE (OUTPATIENT)
Dept: CARDIOLOGY | Facility: CLINIC | Age: 78
End: 2023-11-03
Payer: MEDICARE

## 2023-11-03 DIAGNOSIS — I25.10 CORONARY ARTERY DISEASE INVOLVING NATIVE CORONARY ARTERY OF NATIVE HEART WITHOUT ANGINA PECTORIS: ICD-10-CM

## 2023-11-03 DIAGNOSIS — I10 PRIMARY HYPERTENSION: ICD-10-CM

## 2023-11-03 DIAGNOSIS — I08.0 MITRAL REGURGITATION AND AORTIC STENOSIS: ICD-10-CM

## 2023-11-03 RX ORDER — CLOPIDOGREL BISULFATE 75 MG/1
75 TABLET ORAL DAILY
Qty: 90 TABLET | Refills: 1 | Status: SHIPPED | OUTPATIENT
Start: 2023-11-03 | End: 2024-01-31 | Stop reason: SDUPTHER

## 2023-11-09 DIAGNOSIS — M50.30 DEGENERATIVE DISC DISEASE, CERVICAL: ICD-10-CM

## 2023-11-09 RX ORDER — MELOXICAM 15 MG/1
15 TABLET ORAL DAILY
Qty: 90 TABLET | Refills: 3 | Status: SHIPPED | OUTPATIENT
Start: 2023-11-09

## 2023-11-09 NOTE — TELEPHONE ENCOUNTER
No care due was identified.  [facility] Embedded Care Due Messages. Reference number: [id number].   11/09/2023 12:01:26 PM CST

## 2023-11-29 DIAGNOSIS — N32.81 OVERACTIVE BLADDER: ICD-10-CM

## 2023-11-29 DIAGNOSIS — E78.00 HYPERCHOLESTEROLEMIA: Primary | ICD-10-CM

## 2023-11-29 RX ORDER — OXYBUTYNIN CHLORIDE 10 MG/1
10 TABLET, EXTENDED RELEASE ORAL DAILY
Qty: 90 TABLET | Refills: 2 | Status: SHIPPED | OUTPATIENT
Start: 2023-11-29 | End: 2024-02-01 | Stop reason: SDUPTHER

## 2023-11-29 NOTE — TELEPHONE ENCOUNTER
----- Message from Sol Fall sent at 11/29/2023 12:58 PM CST -----  Regarding: Refill  Type:  RX Refill Request    Who Called: Dick    Refill or New Rx:Refill    RX Name and Strength:oxybutynin (DITROPAN-XL) 10 MG 24 hr tablet      Preferred Pharmacy with phone number:[address]      Local or Mail Order:Local      Would the patient rather a call back or a response via [facility]? Call back    Best Call Back Number:[phone]    Additional Information: Refill

## 2023-11-29 NOTE — TELEPHONE ENCOUNTER
No care due was identified.  Health [facility] Embedded Care Due Messages. Reference number: [id number].   11/29/2023 3:18:59 PM CST

## 2023-11-30 RX ORDER — ROSUVASTATIN CALCIUM 10 MG/1
10 TABLET, COATED ORAL NIGHTLY
Qty: 90 TABLET | Refills: 0 | Status: SHIPPED | OUTPATIENT
Start: 2023-11-30 | End: 2024-11-29

## 2023-12-07 DIAGNOSIS — R06.02 SOB (SHORTNESS OF BREATH): Primary | ICD-10-CM

## 2023-12-07 DIAGNOSIS — I10 PRIMARY HYPERTENSION: ICD-10-CM

## 2023-12-08 RX ORDER — CARVEDILOL 6.25 MG/1
6.25 TABLET ORAL 2 TIMES DAILY WITH MEALS
Qty: 180 TABLET | Refills: 0 | Status: SHIPPED | OUTPATIENT
Start: 2023-12-08 | End: 2024-03-08 | Stop reason: SDUPTHER

## 2023-12-11 ENCOUNTER — PATIENT MESSAGE (OUTPATIENT)
Dept: CARDIOLOGY | Facility: CLINIC | Age: 78
End: 2023-12-11
Payer: MEDICARE

## 2023-12-14 ENCOUNTER — TELEPHONE (OUTPATIENT)
Dept: CARDIOLOGY | Facility: CLINIC | Age: 78
End: 2023-12-14
Payer: MEDICARE

## 2023-12-15 ENCOUNTER — TELEPHONE (OUTPATIENT)
Dept: DERMATOLOGY | Facility: CLINIC | Age: 78
End: 2023-12-15
Payer: MEDICARE

## 2023-12-15 ENCOUNTER — PATIENT MESSAGE (OUTPATIENT)
Dept: DERMATOLOGY | Facility: CLINIC | Age: 78
End: 2023-12-15
Payer: MEDICARE

## 2024-01-02 ENCOUNTER — TELEPHONE (OUTPATIENT)
Dept: GASTROENTEROLOGY | Facility: CLINIC | Age: 79
End: 2024-01-02
Payer: MEDICARE

## 2024-01-02 ENCOUNTER — OFFICE VISIT (OUTPATIENT)
Dept: DERMATOLOGY | Facility: CLINIC | Age: 79
End: 2024-01-02
Payer: MEDICARE

## 2024-01-02 VITALS — WEIGHT: 200.19 LBS | HEIGHT: 70 IN | RESPIRATION RATE: 18 BRPM | BODY MASS INDEX: 28.66 KG/M2

## 2024-01-02 DIAGNOSIS — L57.8 SUN-DAMAGED SKIN: ICD-10-CM

## 2024-01-02 DIAGNOSIS — L90.5 SCAR: ICD-10-CM

## 2024-01-02 DIAGNOSIS — D48.5 NEOPLASM OF UNCERTAIN BEHAVIOR OF SKIN: ICD-10-CM

## 2024-01-02 DIAGNOSIS — L57.0 ACTINIC KERATOSES: ICD-10-CM

## 2024-01-02 DIAGNOSIS — Z85.828 PERSONAL HISTORY OF OTHER MALIGNANT NEOPLASM OF SKIN: Primary | ICD-10-CM

## 2024-01-02 DIAGNOSIS — K21.9 GASTROESOPHAGEAL REFLUX DISEASE, UNSPECIFIED WHETHER ESOPHAGITIS PRESENT: Primary | ICD-10-CM

## 2024-01-02 PROCEDURE — 99999 PR PBB SHADOW E&M-EST. PATIENT-LVL IV: CPT | Mod: PBBFAC,,, | Performed by: DERMATOLOGY

## 2024-01-02 PROCEDURE — 17003 DESTRUCT PREMALG LES 2-14: CPT | Mod: S$PBB,,, | Performed by: DERMATOLOGY

## 2024-01-02 PROCEDURE — 88305 TISSUE EXAM BY PATHOLOGIST: CPT | Mod: PO | Performed by: PATHOLOGY

## 2024-01-02 PROCEDURE — 17003 DESTRUCT PREMALG LES 2-14: CPT | Mod: 59,PBBFAC,PO | Performed by: DERMATOLOGY

## 2024-01-02 PROCEDURE — 17000 DESTRUCT PREMALG LESION: CPT | Mod: 59,PBBFAC,PO | Performed by: DERMATOLOGY

## 2024-01-02 PROCEDURE — 11102 TANGNTL BX SKIN SINGLE LES: CPT | Mod: PBBFAC,PO | Performed by: DERMATOLOGY

## 2024-01-02 PROCEDURE — 11102 TANGNTL BX SKIN SINGLE LES: CPT | Mod: S$PBB,,, | Performed by: DERMATOLOGY

## 2024-01-02 PROCEDURE — 99214 OFFICE O/P EST MOD 30 MIN: CPT | Mod: PBBFAC,PO,25 | Performed by: DERMATOLOGY

## 2024-01-02 PROCEDURE — 99213 OFFICE O/P EST LOW 20 MIN: CPT | Mod: 25,S$PBB,, | Performed by: DERMATOLOGY

## 2024-01-02 PROCEDURE — 88305 TISSUE EXAM BY PATHOLOGIST: CPT | Mod: 26,,, | Performed by: PATHOLOGY

## 2024-01-02 PROCEDURE — 17000 DESTRUCT PREMALG LESION: CPT | Mod: S$PBB,XS,, | Performed by: DERMATOLOGY

## 2024-01-02 RX ORDER — FLUOROURACIL 50 MG/G
CREAM TOPICAL 2 TIMES DAILY
Qty: 40 G | Refills: 1 | Status: SHIPPED | OUTPATIENT
Start: 2024-01-02

## 2024-01-02 NOTE — TELEPHONE ENCOUNTER
----- Message from Grecia Irwin sent at 1/2/2024  8:15 AM CST -----  Contact: Pt Joann Taveras  Type:  Procedure Appointment Request  Name of Caller: Pt Joann Taveras  Symptoms:  Esophageal dysphagia (Procedure was cancelled for 7/20/23 due to other health issues)  Best Call Back Number: Tushar Taveras [phone]  Additional Information: Pt is in better health, would like to have procedure r/s...  Please call to advise... Thank you...

## 2024-01-02 NOTE — PROGRESS NOTES
Subjective:      Patient ID:  Eligio Richardson is a 78 y.o. male who presents for   Chief Complaint   Patient presents with    Skin Check     HPI  Patient present for UBSC.   C/o spot on face X months. Pt states spots itches and was painful.  Has treated various areas with chemo cream in past.   Desires refill  Spot right temple much improved since booked appt.     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  2016 skin cancer right forehead  2014Skin cancer excised from face  No fhx of skin cancer    Past Medical History:   Diagnosis Date    Anticoagulant long-term use     aspirin and plavix    Cancer     skin cancer to face    Cholelithiasis     Constipation, chronic     severe    Coronary artery disease     mitral valve regurgitation, aortic stenosis    Degenerative disc disease, cervical     Gallstones     GERD (gastroesophageal reflux disease)     Hiatal hernia     Hypothyroidism     Inguinal hernia without mention of obstruction or gangrene, unilateral or unspecified, (not specified as recurrent)     Insomnia     Irregular heart beat     currently not an issue and not on any meds for it    Osteoarthritis     Renal disorder     CKD Stage 3a    Sleep apnea     does not use CPAP; wife denies       Review of Systems   Constitutional:  Negative for fever, chills and fatigue.   HENT:  Negative for nosebleeds.    Respiratory:  Negative for cough.    Gastrointestinal:  Negative for vomiting.   Skin:  Positive for wears hat. Negative for daily sunscreen use, activity-related sunscreen use and recent sunburn.   Hematologic/Lymphatic: Bruises/bleeds easily.       Objective:   Physical Exam   Constitutional: He appears well-developed and well-nourished. No distress.   HENT:   Mouth/Throat: Lips normal.    Eyes: Lids are normal.  No conjunctival no injection.   Neurological: He is alert and oriented to person, place, and time. He is not disoriented.   Psychiatric: He has a normal mood and affect.   Skin:    Areas Examined (abnormalities noted in diagram):   Scalp / Hair Palpated and Inspected  Head / Face Inspection Performed  Neck Inspection Performed  Chest / Axilla Inspection Performed  Abdomen Inspection Performed  Back Inspection Performed  RUE Inspected  LUE Inspection Performed                 Diagram Legend     Erythematous scaling macule/papule c/w actinic keratosis       Vascular papule c/w angioma      Pigmented verrucoid papule/plaque c/w seborrheic keratosis      Yellow umbilicated papule c/w sebaceous hyperplasia      Irregularly shaped tan macule c/w lentigo     1-2 mm smooth white papules consistent with Milia      Movable subcutaneous cyst with punctum c/w epidermal inclusion cyst      Subcutaneous movable cyst c/w pilar cyst      Firm pink to brown papule c/w dermatofibroma      Pedunculated fleshy papule(s) c/w skin tag(s)      Evenly pigmented macule c/w junctional nevus     Mildly variegated pigmented, slightly irregular-bordered macule c/w mildly atypical nevus      Flesh colored to evenly pigmented papule c/w intradermal nevus       Pink pearly papule/plaque c/w basal cell carcinoma      Erythematous hyperkeratotic cursted plaque c/w SCC      Surgical scar with no sign of skin cancer recurrence      Open and closed comedones      Inflammatory papules and pustules      Verrucoid papule consistent consistent with wart     Erythematous eczematous patches and plaques     Dystrophic onycholytic nail with subungual debris c/w onychomycosis     Umbilicated papule    Erythematous-base heme-crusted tan verrucoid plaque consistent with inflamed seborrheic keratosis     Erythematous Silvery Scaling Plaque c/w Psoriasis     See annotation      Assessment / Plan:      Pathology Orders:       Normal Orders This Visit    Specimen to Pathology, Dermatology     Comments:    Number of Specimens:->1  ------------------------->-------------------------  Spec 1 Procedure:->Biopsy  Spec 1 Clinical Impression:->bcc  check margins  Spec 1 Source:->left back  Release to patient->Immediate    Questions:    Procedure Type: Dermatology and skin neoplasms    Number of Specimens: 1    ------------------------: -------------------------    Spec 1 Procedure: Biopsy    Spec 1 Clinical Impression: bcc check margins    Spec 1 Source: left back    Clinical Information: pearly pink plaque    Release to patient: Immediate          Personal history of other malignant neoplasm of skin  Area(s) of previous NMSC evaluated with no signs of recurrence.    Upper body skin examination performed today including at least 6 points as noted in physical examination. Suspicious lesions noted.      Scar  NER NMSC    Neoplasm of uncertain behavior of skin  -     Specimen to Pathology, Dermatology  Shave biopsy procedure note:    Shave biopsy performed after verbal consent including risk of infection, scar, recurrence, need for additional treatment of site. Area prepped with alcohol, anesthetized with approximately 1.0cc of 1% lidocaine with epinephrine. Lesional tissue shaved with razor blade. Hemostasis achieved with application of aluminum chloride followed by hyfrecation. No complications. Dressing applied. Wound care explained.    Actinic keratoses  Cryosurgery Procedure Note    Verbal consent from the patient is obtained and the patient is aware of the precancerous quality and need for treatment of these lesions. Liquid nitrogen cryosurgery is applied to the 4 actinic keratoses, as detailed in the physical exam, to produce a freeze injury. The patient is aware that blisters may form and is instructed on wound care with gentle cleansing and use of vaseline ointment to keep moist until healed. The patient is supplied a handout on cryosurgery and is instructed to call if lesions do not completely resolve. Discussed risk postinflammatory pigmentary changes.       Sun-damaged skin  -     fluorouraciL (EFUDEX) 5 % cream; Apply topically 2 (two) times daily.  For 2 weeks.  Dispense: 40 g; Refill: 1             Follow up in about 6 months (around 7/2/2024).

## 2024-01-02 NOTE — TELEPHONE ENCOUNTER
Spoke with pt's daughter, Darcy. Scheduled EGD. Reviewed prep instructions. Darcy verbalized understanding to all

## 2024-01-03 ENCOUNTER — E-CONSULT (OUTPATIENT)
Dept: CARDIOLOGY | Facility: CLINIC | Age: 79
End: 2024-01-03
Payer: MEDICARE

## 2024-01-03 DIAGNOSIS — I25.10 CORONARY ARTERY DISEASE INVOLVING NATIVE CORONARY ARTERY OF NATIVE HEART WITHOUT ANGINA PECTORIS: Primary | ICD-10-CM

## 2024-01-03 PROCEDURE — 99451 NTRPROF PH1/NTRNET/EHR 5/>: CPT | Mod: S$PBB,,, | Performed by: INTERNAL MEDICINE

## 2024-01-03 NOTE — TELEPHONE ENCOUNTER
The patient has had an E-Consult completed. Please refer to that note as needed. Please communicate the information below to the patient. Based on the recommendations of the specialist, I recommend that the patient do the following:    Acceptable CV risk for EGD, okay to hold Plavix 5 days

## 2024-01-03 NOTE — CONSULTS
[facility]  Response for E-Consult     Patient Name: Eligio Richardson  MRN: [id number]  Primary Care Provider: Acosta Corley MD   Requesting Provider: Ricky Arriola MD  Consults    Recommendation:  Acceptable CV risk hold Plavix 5 days    Contingency:     Total time of Consultation: 5 minute    I did not speak to the requesting provider verbally about this.     *This eConsult is based on the clinical data available to me and is furnished without benefit of a physical examination. The eConsult will need to be interpreted in light of any clinical issues or changes in patient status not available to me at the time of filing this eConsults. Significant changes in patient condition or level of acuity should result in immediate formal consultation and reevaluation. Please alert me if you have further questions.    Thank you for this eConsult referral.     Joaquin Freedman MD  [facility]

## 2024-01-04 LAB
FINAL PATHOLOGIC DIAGNOSIS: NORMAL
GROSS: NORMAL
Lab: NORMAL
MICROSCOPIC EXAM: NORMAL

## 2024-01-08 DIAGNOSIS — E03.9 ACQUIRED HYPOTHYROIDISM: ICD-10-CM

## 2024-01-08 NOTE — TELEPHONE ENCOUNTER
Care Due:                  Date            Visit Type   Department     Provider  --------------------------------------------------------------------------------                                EP -                              PRIMARY      [facility] FAMILY  Last Visit: 06-      CARE ([facility])   MEDICINE       [place]                              ANNUAL                              CHECKUP/PHY  [facility]  Next Visit: 02-      S            MARIIA Corley                                                            Last  Test          Frequency    Reason                     Performed    Due Date  --------------------------------------------------------------------------------    TSH.........  12 months..  levothyroxine............  03- 03-    [facility] Embedded Care Due Messages. Reference number: [id number].   1/08/2024 4:04:57 PM CST

## 2024-01-09 RX ORDER — LEVOTHYROXINE SODIUM 88 UG/1
88 TABLET ORAL DAILY
Qty: 90 TABLET | Refills: 0 | Status: SHIPPED | OUTPATIENT
Start: 2024-01-09

## 2024-01-09 NOTE — TELEPHONE ENCOUNTER
Provider Staff:  Action required for this patient    Requires labs      Please see care gap opportunities below in Care Due Message.    Thanks!  [facility]     Appointments      Date Provider   Last Visit   6/13/2023 Acosta Corley MD   Next Visit   2/1/2024 Acosta Corley MD     Refill Decision Note   Eligio Richardson  is requesting a refill authorization.  Brief Assessment and Rationale for Refill:  Approve     Medication Therapy Plan:  MRM resolved      Comments:     Note composed:11:01 AM 01/09/2024

## 2024-01-11 ENCOUNTER — CLINICAL SUPPORT (OUTPATIENT)
Dept: CARDIOLOGY | Facility: CLINIC | Age: 79
End: 2024-01-11
Attending: INTERNAL MEDICINE
Payer: MEDICARE

## 2024-01-11 VITALS — BODY MASS INDEX: 28.63 KG/M2 | WEIGHT: 200 LBS | HEIGHT: 70 IN

## 2024-01-11 DIAGNOSIS — I25.10 CORONARY ARTERY DISEASE INVOLVING NATIVE CORONARY ARTERY OF NATIVE HEART WITHOUT ANGINA PECTORIS: Chronic | ICD-10-CM

## 2024-01-11 DIAGNOSIS — I08.0 MITRAL REGURGITATION AND AORTIC STENOSIS: Chronic | ICD-10-CM

## 2024-01-11 LAB
AV INDEX (PROSTH): 0.33
AV MEAN GRADIENT: 16 MMHG
AV PEAK GRADIENT: 23 MMHG
AV REGURGITATION PRESSURE HALF TIME: 861.93 MS
AV VALVE AREA BY VELOCITY RATIO: 1.48 CM²
AV VALVE AREA: 1.4 CM²
AV VELOCITY RATIO: 0.35
BSA FOR ECHO PROCEDURE: 2.12 M2
CV ECHO LV RWT: 0.46 CM
DOP CALC AO PEAK VEL: 2.41 M/S
DOP CALC AO VTI: 65 CM
DOP CALC LVOT AREA: 4.2 CM2
DOP CALC LVOT DIAMETER: 2.31 CM
DOP CALC LVOT PEAK VEL: 0.85 M/S
DOP CALC LVOT STROKE VOLUME: 90.9 CM3
DOP CALCLVOT PEAK VEL VTI: 21.7 CM
E WAVE DECELERATION TIME: 172.84 MSEC
E/A RATIO: 1.34
E/E' RATIO: 13.23 M/S
ECHO LV POSTERIOR WALL: 1.14 CM (ref 0.6–1.1)
EJECTION FRACTION: 65 %
FRACTIONAL SHORTENING: 36 % (ref 28–44)
INTERVENTRICULAR SEPTUM: 1.21 CM (ref 0.6–1.1)
IVRT: 128.03 MSEC
LEFT ATRIUM SIZE: 4.38 CM
LEFT ATRIUM VOLUME INDEX MOD: 30.8 ML/M2
LEFT ATRIUM VOLUME MOD: 64.47 CM3
LEFT INTERNAL DIMENSION IN SYSTOLE: 3.17 CM (ref 2.1–4)
LEFT VENTRICLE DIASTOLIC VOLUME INDEX: 56.4 ML/M2
LEFT VENTRICLE DIASTOLIC VOLUME: 117.88 ML
LEFT VENTRICLE MASS INDEX: 108 G/M2
LEFT VENTRICLE SYSTOLIC VOLUME INDEX: 19.2 ML/M2
LEFT VENTRICLE SYSTOLIC VOLUME: 40.16 ML
LEFT VENTRICULAR INTERNAL DIMENSION IN DIASTOLE: 4.99 CM (ref 3.5–6)
LEFT VENTRICULAR MASS: 226.23 G
LV LATERAL E/E' RATIO: 14.33 M/S
LV SEPTAL E/E' RATIO: 12.29 M/S
LVOT MG: 1.58 MMHG
LVOT MV: 0.59 CM/S
MV PEAK A VEL: 0.64 M/S
MV PEAK E VEL: 0.86 M/S
MV STENOSIS PRESSURE HALF TIME: 50.12 MS
MV VALVE AREA P 1/2 METHOD: 4.39 CM2
PISA AR MAX VEL: 4.05 M/S
PISA TR MAX VEL: 1.69 M/S
PULM VEIN S/D RATIO: 0.77
PV PEAK D VEL: 0.66 M/S
PV PEAK S VEL: 0.51 M/S
RA MAJOR: 5.24 CM
RA PRESSURE ESTIMATED: 8 MMHG
RA WIDTH: 4.6 CM
RIGHT VENTRICULAR END-DIASTOLIC DIMENSION: 3.8 CM
RIGHT VENTRICULAR LENGTH IN DIASTOLE (APICAL 4-CHAMBER VIEW): 5.33 CM
RV MID DIAMA: 2.36 CM
RV TB RVSP: 10 MMHG
RV TISSUE DOPPLER FREE WALL SYSTOLIC VELOCITY 1 (APICAL 4 CHAMBER VIEW): 15.79 CM/S
SINUS: 3.32 CM
STJ: 3.37 CM
TDI LATERAL: 0.06 M/S
TDI SEPTAL: 0.07 M/S
TDI: 0.07 M/S
TR MAX PG: 11 MMHG
TRICUSPID ANNULAR PLANE SYSTOLIC EXCURSION: 2.69 CM
TV REST PULMONARY ARTERY PRESSURE: 19 MMHG
Z-SCORE OF LEFT VENTRICULAR DIMENSION IN END DIASTOLE: -2.56
Z-SCORE OF LEFT VENTRICULAR DIMENSION IN END SYSTOLE: -1.72

## 2024-01-11 PROCEDURE — 93306 TTE W/DOPPLER COMPLETE: CPT | Mod: S$GLB,,, | Performed by: INTERNAL MEDICINE

## 2024-01-18 ENCOUNTER — LAB VISIT (OUTPATIENT)
Dept: PRIMARY CARE CLINIC | Facility: CLINIC | Age: 79
End: 2024-01-18
Payer: MEDICARE

## 2024-01-18 DIAGNOSIS — I25.10 CORONARY ARTERY DISEASE INVOLVING NATIVE CORONARY ARTERY OF NATIVE HEART WITHOUT ANGINA PECTORIS: Chronic | ICD-10-CM

## 2024-01-18 DIAGNOSIS — I95.1 ORTHOSTASIS: ICD-10-CM

## 2024-01-18 DIAGNOSIS — E78.00 HYPERCHOLESTEROLEMIA: Chronic | ICD-10-CM

## 2024-01-18 DIAGNOSIS — Z79.02 LONG TERM (CURRENT) USE OF ANTITHROMBOTICS/ANTIPLATELETS: Chronic | ICD-10-CM

## 2024-01-18 LAB
ALBUMIN SERPL BCP-MCNC: 4.2 G/DL (ref 3.5–5.2)
ALP SERPL-CCNC: 66 U/L (ref 55–135)
ALT SERPL W/O P-5'-P-CCNC: 24 U/L (ref 10–44)
ANION GAP SERPL CALC-SCNC: 8 MMOL/L (ref 8–16)
AST SERPL-CCNC: 20 U/L (ref 10–40)
BILIRUB SERPL-MCNC: 1.1 MG/DL (ref 0.1–1)
BUN SERPL-MCNC: 20 MG/DL (ref 8–23)
CALCIUM SERPL-MCNC: 9.8 MG/DL (ref 8.7–10.5)
CHLORIDE SERPL-SCNC: 107 MMOL/L (ref 95–110)
CO2 SERPL-SCNC: 22 MMOL/L (ref 23–29)
CREAT SERPL-MCNC: 1.1 MG/DL (ref 0.5–1.4)
EST. GFR  (NO RACE VARIABLE): >60 ML/MIN/1.73 M^2
GLUCOSE SERPL-MCNC: 100 MG/DL (ref 70–110)
HGB BLD-MCNC: 13.9 G/DL (ref 14–18)
POTASSIUM SERPL-SCNC: 5 MMOL/L (ref 3.5–5.1)
PROT SERPL-MCNC: 6.7 G/DL (ref 6–8.4)
SODIUM SERPL-SCNC: 137 MMOL/L (ref 136–145)

## 2024-01-18 PROCEDURE — 80053 COMPREHEN METABOLIC PANEL: CPT | Performed by: INTERNAL MEDICINE

## 2024-01-18 PROCEDURE — 36415 COLL VENOUS BLD VENIPUNCTURE: CPT | Mod: S$GLB,,, | Performed by: INTERNAL MEDICINE

## 2024-01-18 PROCEDURE — 85018 HEMOGLOBIN: CPT | Performed by: INTERNAL MEDICINE

## 2024-01-22 ENCOUNTER — DOCUMENTATION ONLY (OUTPATIENT)
Dept: AUDIOLOGY | Facility: CLINIC | Age: 79
End: 2024-01-22
Payer: MEDICARE

## 2024-01-22 NOTE — PROGRESS NOTES
The patient came into the clinic because he was having trouble changing his [employer] wax filters. I cleaned the hearing aids and fixed the wax filter issue for the patient. The patient requested more wax filters to take home for future cleaning. The patient is scheduled to see see his Audiologist Margaret in February of 2024. I put a note in asking her to find out if he would like to switch his receivers to [employer] receivers.

## 2024-01-31 ENCOUNTER — OFFICE VISIT (OUTPATIENT)
Dept: CARDIOLOGY | Facility: CLINIC | Age: 79
End: 2024-01-31
Payer: MEDICARE

## 2024-01-31 VITALS
HEART RATE: 61 BPM | WEIGHT: 197.75 LBS | BODY MASS INDEX: 28.37 KG/M2 | DIASTOLIC BLOOD PRESSURE: 75 MMHG | SYSTOLIC BLOOD PRESSURE: 137 MMHG

## 2024-01-31 DIAGNOSIS — I65.23 CAROTID ARTERY PLAQUE, BILATERAL: Chronic | ICD-10-CM

## 2024-01-31 DIAGNOSIS — I25.10 CORONARY ARTERY DISEASE INVOLVING NATIVE CORONARY ARTERY OF NATIVE HEART WITHOUT ANGINA PECTORIS: Primary | Chronic | ICD-10-CM

## 2024-01-31 DIAGNOSIS — R53.83 OTHER FATIGUE: Chronic | ICD-10-CM

## 2024-01-31 DIAGNOSIS — I77.810 ASCENDING AORTA DILATATION: ICD-10-CM

## 2024-01-31 DIAGNOSIS — E66.3 OVERWEIGHT (BMI 25.0-29.9): ICD-10-CM

## 2024-01-31 DIAGNOSIS — I10 PRIMARY HYPERTENSION: ICD-10-CM

## 2024-01-31 DIAGNOSIS — R20.9 COLD LEFT FOOT: ICD-10-CM

## 2024-01-31 DIAGNOSIS — Z79.02 LONG TERM (CURRENT) USE OF ANTITHROMBOTICS/ANTIPLATELETS: ICD-10-CM

## 2024-01-31 DIAGNOSIS — I08.0 MITRAL REGURGITATION AND AORTIC STENOSIS: Chronic | ICD-10-CM

## 2024-01-31 DIAGNOSIS — I73.9 CLAUDICATION: ICD-10-CM

## 2024-01-31 PROCEDURE — 99214 OFFICE O/P EST MOD 30 MIN: CPT | Mod: S$GLB,,, | Performed by: INTERNAL MEDICINE

## 2024-01-31 RX ORDER — CLOPIDOGREL BISULFATE 75 MG/1
75 TABLET ORAL DAILY
Qty: 90 TABLET | Refills: 1 | Status: SHIPPED | OUTPATIENT
Start: 2024-01-31 | End: 2024-05-01 | Stop reason: SDUPTHER

## 2024-01-31 NOTE — PROGRESS NOTES
Subjective:    Patient ID:  Eligio Richardson is a 79 y.o. male who presents for Hypertension (6 month follow up.)        HPI  DISCUSSED TESTS ECHO NORMAL LV FUNCTION MILDLY DILATED LEFT ATRIUM, MILD-TO-MODERATE AORTIC STENOSIS, MILD AI, MILD-TO-MODERATE MR MILDLY DILATED ASCENDING AORTA, CMP OK HEMOGLOBIN 13.9, DOING OK, FATIGUE, NOT TAKING HCTZ, SEE ROS    Past Medical History:   Diagnosis Date    Anticoagulant long-term use     aspirin and plavix    Cancer     skin cancer to face    Cholelithiasis     Constipation, chronic     severe    Coronary artery disease     mitral valve regurgitation, aortic stenosis    Degenerative disc disease, cervical     Gallstones     GERD (gastroesophageal reflux disease)     Hiatal hernia     Hypothyroidism     Inguinal hernia without mention of obstruction or gangrene, unilateral or unspecified, (not specified as recurrent)     Insomnia     Irregular heart beat     currently not an issue and not on any meds for it    Osteoarthritis     Renal disorder     CKD Stage 3a    Sleep apnea     does not use CPAP; wife denies     Past Surgical History:   Procedure Laterality Date    ANGIOGRAM, CORONARY, WITH LEFT HEART CATHETERIZATION N/A 10/18/2022    Procedure: Angiogram, Coronary, with Left Heart Cath;  Surgeon: Joaquin Freedman MD;  Location: [facility] CATH;  Service: Cardiology;  Laterality: N/A;    CHOLECYSTECTOMY      COLONOSCOPY  2013    Dr. Gee    COLONOSCOPY N/A 06/06/2016    Procedure: COLONOSCOPY;  Surgeon: Ricky Arriola MD;  Location: [facility] ENDO;  Service: Endoscopy;  Laterality: N/A;    COLONOSCOPY  07/15/2021    Dr. Hawk    CYSTOSCOPY N/A 06/18/2018    Procedure: CYSTOSCOPY;  Surgeon: Andry Paz MD;  Location: [facility] OR;  Service: Urology;  Laterality: N/A;    CYSTOURETHROSCOPY  10/2019    EGD, WITH BALLOON DILATION N/A 09/21/2021    ELBOW ARTHROPLASTY      EYE SURGERY Bilateral     cataract    FRACTURE SURGERY Right     knee     FUNCTIONAL ENDOSCOPIC SINUS SURGERY  "(FESS) USING COMPUTER-ASSISTED NAVIGATION Bilateral 6/9/2023    Procedure: SINUS SURGERY FUNCTIONAL ENDOSCOPIC WITH NAVIGATION;  Surgeon: Dick Hernandez MD;  Location: [facility] OR;  Service: ENT;  Laterality: Bilateral;    HIATAL HERNIA REPAIR  2013    [facility]    KNEE ARTHROSCOPY Right     NASAL SEPTOPLASTY Bilateral 6/9/2023    Procedure: SEPTOPLASTY;  Surgeon: Dick Hernandez MD;  Location: [facility] OR;  Service: ENT;  Laterality: Bilateral;    neck injection  10/2019    NECK SURGERY      prostate biospy      normal per pt    SHOULDER SURGERY Bilateral     SINUS SURGERY  01/2019    [facility]     TONSILLECTOMY      TRANSRECTAL BIOPSY OF PROSTATE WITH ULTRASOUND GUIDANCE N/A 06/18/2018    Procedure: BIOPSY, PROSTATE, TRANSRECTAL APPROACH, WITH US GUIDANCE;  Surgeon: Andry Paz MD;  Location: [facility] OR;  Service: Urology;  Laterality: N/A;    UPPER GASTROINTESTINAL ENDOSCOPY  ~2013     Family History   Problem Relation Age of Onset    Lung cancer Mother     Skin cancer Father     Lung cancer Father     Prostate cancer Maternal Uncle     Prostate cancer Paternal Uncle     Colon cancer Neg Hx     Colon polyps Neg Hx     Esophageal cancer Neg Hx     Stomach cancer Neg Hx     Liver cancer Neg Hx     Crohn's disease Neg Hx     Inflammatory bowel disease Neg Hx     Irritable bowel syndrome Neg Hx      Social History     Socioeconomic History    Marital status:    Occupational History    Occupation: retired, worked at paper mill     Comment: [employer]    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 , Disp: , Rfl:     aspirin 81 MG Chew, Take 81 mg by mouth every other day., Disp: , Rfl:     carvediloL (COREG) 6.25 MG tablet, Take 1 tablet (6.25 mg total) by mouth 2 (two) times daily with meals., Disp: 180 tablet, Rfl: 0    fluorouraciL (EFUDEX) 5 % cream, Apply topically 2 (two) times daily. For 2 weeks., Disp: 40 g, Rfl: 1    fluticasone propionate (FLONASE) 50 mcg/actuation nasal spray, 1 spray by Each Nostril route 2 (two) times daily., Disp: , Rfl:     L gasseri/B bifidum/B longum (PROBIOTIC COLON CARE ORAL), Take 1 tablet by mouth once daily., Disp: , Rfl:     levothyroxine (SYNTHROID) 88 MCG tablet, Take 1 tablet (88 mcg total) by mouth once daily., Disp: 90 tablet, Rfl: 0    mag/aluminum/sod bicarb/alginc (GAVISCON ORAL), Take by mouth., Disp: , Rfl:     meloxicam (MOBIC) 15 MG tablet, Take 1 tablet (15 mg total) by mouth once daily., Disp: 90 tablet, Rfl: 3    montelukast (SINGULAIR) 10 mg tablet, Take 1 tablet (10 mg total) by mouth once daily., Disp: 90 tablet, Rfl: 1    mupirocin (BACTROBAN) 2 % ointment, by Nasal route once daily., Disp: 22 g, Rfl: 2    rosuvastatin (CRESTOR) 10 MG tablet, Take 1 tablet (10 mg total) by mouth every evening., Disp: 90 tablet, Rfl: 0    clopidogreL (PLAVIX) 75 mg tablet, Take 1 tablet (75 mg total) by mouth once daily., Disp: 90 tablet, Rfl: 1    diphenhydrAMINE (BENADRYL) 50 MG capsule, Take 50mg by mouth 1 hour before contrast administration., Disp: 1 capsule, Rfl: 0    finasteride (PROSCAR) 5 mg tablet, Take 1 tablet (5 mg total) by mouth once daily., Disp: 90 tablet, Rfl: 3    oxybutynin (DITROPAN-XL) 10 MG 24 hr tablet, Take 1 tablet (10 mg total) by mouth once daily., Disp: 90 tablet, Rfl: 3    pantoprazole (PROTONIX) 40 MG tablet, Take 1 tablet (40 mg total) by mouth once daily., Disp: 90 tablet, Rfl: 3    predniSONE (DELTASONE) 50 MG Tab, Take 50mg  by mouth at 13 hours, 7 hours, and 1 hour before contrast administration., Disp: 3 tablet, Rfl: 0    tamsulosin (FLOMAX) 0.4 mg Cap, Take 1 capsule (0.4 mg total) by mouth 2 (two) times a day., Disp: 180 capsule, Rfl: 3  No current facility-administered medications for this visit.    Review of Systems   Constitutional: Positive for malaise/fatigue. Negative for chills, diaphoresis, fever, night sweats and weight loss.   HENT:  Negative for congestion and nosebleeds.    Eyes:  Negative for blurred vision and redness.   Cardiovascular:  Positive for claudication. Negative for chest pain, cyanosis, dyspnea on exertion (MILD), irregular heartbeat, leg swelling, near-syncope, orthopnea, palpitations, paroxysmal nocturnal dyspnea and syncope.   Respiratory:  Negative for cough (OCC), hemoptysis, shortness of breath and wheezing.    Hematologic/Lymphatic: Negative for adenopathy. Does not bruise/bleed easily.        LEUKEMIA, PROSTATE CANCER, STABLE   Skin:  Negative for color change and itching.   Musculoskeletal:  Positive for back pain (MILD,CHRONIC, HYDROCODONE). Negative for falls. Muscle weakness: R FOOT.  Gastrointestinal:  Negative for abdominal pain, change in bowel habit, dysphagia, jaundice, melena and nausea.   Genitourinary:  Negative for dysuria and flank pain.   Neurological:  Positive for light-headedness. Negative for brief paralysis, focal weakness, loss of balance and weakness. Numbness: L FOOT.  Psychiatric/Behavioral:  Negative for altered mental status and depression.    Allergic/Immunologic: Negative for persistent infections.        Objective:      Vitals:    01/31/24 1007   BP: 137/75   Pulse: 61   Weight: 89.7 kg (197 lb 12 oz)   PainSc:   3   PainLoc: Abdomen     Body mass index is 28.37 kg/m².    Physical Exam  Constitutional:       Appearance: Normal appearance.   HENT:      Head: Normocephalic and atraumatic.   Eyes:      General: No scleral icterus.     Extraocular Movements: Extraocular  movements intact.   Neck:      Vascular: Normal carotid pulses. No JVD.   Cardiovascular:      Rate and Rhythm: Normal rate and regular rhythm. No extrasystoles are present.     Pulses: Normal pulses.           Carotid pulses are 2+ on the right side and 2+ on the left side.       Radial pulses are 2+ on the right side and 2+ on the left side.        Posterior tibial pulses are 2+ on the right side and 2+ on the left side.      Heart sounds: Murmur heard.      Systolic murmur is present with a grade of 2/6 at the upper right sternal border.      No friction rub. No gallop.   Pulmonary:      Effort: Pulmonary effort is normal.      Breath sounds: Normal breath sounds and air entry. No rales.   Abdominal:      Palpations: Abdomen is soft.      Tenderness: There is no abdominal tenderness.   Musculoskeletal:      Cervical back: Neck supple.      Right lower leg: No edema.      Left lower leg: No edema.   Skin:     General: Skin is warm and dry.      Capillary Refill: Capillary refill takes less than 2 seconds.   Neurological:      General: No focal deficit present.      Mental Status: He is alert and oriented to person, place, and time.      Cranial Nerves: Cranial nerve deficit (HEARING AIDS) present.   Psychiatric:         Mood and Affect: Mood normal.         Speech: Speech normal.         Behavior: Behavior normal.                 ..    Chemistry        Component Value Date/Time     02/01/2024 1003    K 4.5 02/01/2024 1003     02/01/2024 1003    CO2 24 02/01/2024 1003    BUN 22 02/01/2024 1003    CREATININE 1.1 02/01/2024 1003     02/01/2024 1003        Component Value Date/Time    CALCIUM 10.1 02/01/2024 1003    ALKPHOS 65 02/01/2024 1003    AST 17 02/01/2024 1003    ALT 21 02/01/2024 1003    BILITOT 0.8 02/01/2024 1003    ESTGFRAFRICA >60 12/07/2020 0905    EGFRNONAA >60 12/07/2020 0905            ..  Lab Results   Component Value Date    CHOL 117 (L) 03/02/2023    CHOL 154 12/07/2020    CHOL  149 10/19/2018     Lab Results   Component Value Date    HDL 56 03/02/2023    HDL 51 12/07/2020    HDL 52 10/19/2018     Lab Results   Component Value Date    LDLCALC 47.4 (L) 03/02/2023    LDLCALC 88.8 12/07/2020    LDLCALC 83.8 10/19/2018     Lab Results   Component Value Date    TRIG 68 03/02/2023    TRIG 71 12/07/2020    TRIG 66 10/19/2018     Lab Results   Component Value Date    CHOLHDL 47.9 03/02/2023    CHOLHDL 33.1 12/07/2020    CHOLHDL 34.9 10/19/2018     ..  Lab Results   Component Value Date    WBC 11.36 02/01/2024    HGB 14.3 02/01/2024    HCT 44.3 02/01/2024    MCV 93 02/01/2024     02/01/2024       Test(s) Reviewed  I have reviewed the following in detail:  [] Stress test   [] Angiography   [x] Echocardiogram   [x] Labs   [] Other:       Assessment:         ICD-10-CM ICD-9-CM   1. Coronary artery disease involving native coronary artery of native heart without angina pectoris  I25.10 414.01   2. Ascending aorta dilatation  I77.810 447.71   3. Claudication  I73.9 443.9   4. Mitral regurgitation and aortic stenosis  I08.0 396.2   5. Carotid artery plaque, bilateral  I65.23 433.10     433.30   6. Primary hypertension  I10 401.9   7. Long term (current) use of antithrombotics/antiplatelets  Z79.02 V58.63   8. Overweight (BMI 25.0-29.9)  E66.3 278.02   9. Cold left foot  R20.9 782.9   10. Other fatigue  R53.83 780.79     Problem List Items Addressed This Visit          Neuro    Cold left foot    Relevant Orders    CV Ultrasound doppler arterial legs bilat       Cardiac/Vascular    Primary hypertension    Relevant Medications    clopidogreL (PLAVIX) 75 mg tablet    Mitral regurgitation and aortic stenosis    Relevant Medications    clopidogreL (PLAVIX) 75 mg tablet    Ascending aorta dilatation    Carotid artery plaque, bilateral    Coronary artery disease involving native coronary artery of native heart without angina pectoris - Primary    Relevant Medications    clopidogreL (PLAVIX) 75 mg tablet     Claudication    Relevant Orders    CV Ultrasound doppler arterial legs bilat       Hematology    Long term (current) use of antithrombotics/antiplatelets       Endocrine    Overweight (BMI 25.0-29.9)       Other    Other fatigue    Relevant Orders    TSH (Completed)        Plan:         TSH, ARTERIAL DOPPLER TO ASSESS PERIPHERAL VASCULAR DISEASE, ALL OTHER CV CLINICALLY STABLE, NO ANGINA, NO HF, NO TIA, NO CLINICAL ARRHYTHMIA,CONTINUE CURRENT MEDS, EDUCATION, DIET, EXERCISE INCREASE ACTIVITY AND MENTAL STIMULATION RETURN TO CLINIC IN, 6 MO, DISCUSSED PLAN WITH THE PATIENT AND HIS WIFE  Coronary artery disease involving native coronary artery of native heart without angina pectoris  -     clopidogreL (PLAVIX) 75 mg tablet; Take 1 tablet (75 mg total) by mouth once daily.  Dispense: 90 tablet; Refill: 1    Ascending aorta dilatation    Claudication  -     CV Ultrasound doppler arterial legs bilat; Future    Mitral regurgitation and aortic stenosis  -     clopidogreL (PLAVIX) 75 mg tablet; Take 1 tablet (75 mg total) by mouth once daily.  Dispense: 90 tablet; Refill: 1    Carotid artery plaque, bilateral    Primary hypertension  -     clopidogreL (PLAVIX) 75 mg tablet; Take 1 tablet (75 mg total) by mouth once daily.  Dispense: 90 tablet; Refill: 1    Long term (current) use of antithrombotics/antiplatelets    Overweight (BMI 25.0-29.9)    Cold left foot  -     CV Ultrasound doppler arterial legs bilat; Future    Other fatigue  -     TSH; Future; Expected date: 01/31/2024    RTC Low level/low impact aerobic exercise 5x's/wk. Heart healthy diet and risk factor modification.    See labs and med orders.    Aerobic exercise 5x's/wk. Heart healthy diet and risk factor modification.    See labs and med orders.

## 2024-02-01 ENCOUNTER — OFFICE VISIT (OUTPATIENT)
Dept: FAMILY MEDICINE | Facility: CLINIC | Age: 79
End: 2024-02-01
Payer: MEDICARE

## 2024-02-01 ENCOUNTER — LAB VISIT (OUTPATIENT)
Dept: LAB | Facility: HOSPITAL | Age: 79
End: 2024-02-01
Attending: INTERNAL MEDICINE
Payer: MEDICARE

## 2024-02-01 VITALS
WEIGHT: 195.44 LBS | SYSTOLIC BLOOD PRESSURE: 130 MMHG | BODY MASS INDEX: 28.04 KG/M2 | HEART RATE: 62 BPM | OXYGEN SATURATION: 97 % | DIASTOLIC BLOOD PRESSURE: 74 MMHG

## 2024-02-01 DIAGNOSIS — N40.0 BENIGN NON-NODULAR PROSTATIC HYPERPLASIA WITHOUT LOWER URINARY TRACT SYMPTOMS: ICD-10-CM

## 2024-02-01 DIAGNOSIS — R09.89 LABILE HYPERTENSION: ICD-10-CM

## 2024-02-01 DIAGNOSIS — Z91.041 CONTRAST MEDIA ALLERGY: ICD-10-CM

## 2024-02-01 DIAGNOSIS — N32.81 OVERACTIVE BLADDER: ICD-10-CM

## 2024-02-01 DIAGNOSIS — I25.10 CORONARY ARTERY DISEASE INVOLVING NATIVE CORONARY ARTERY OF NATIVE HEART WITHOUT ANGINA PECTORIS: ICD-10-CM

## 2024-02-01 DIAGNOSIS — C91.10 CLL (CHRONIC LYMPHOCYTIC LEUKEMIA): ICD-10-CM

## 2024-02-01 DIAGNOSIS — N18.31 STAGE 3A CHRONIC KIDNEY DISEASE: ICD-10-CM

## 2024-02-01 DIAGNOSIS — C61 PROSTATE CANCER: ICD-10-CM

## 2024-02-01 DIAGNOSIS — R53.83 OTHER FATIGUE: ICD-10-CM

## 2024-02-01 DIAGNOSIS — D69.6 THROMBOCYTOPENIA, UNSPECIFIED: ICD-10-CM

## 2024-02-01 DIAGNOSIS — E03.9 ACQUIRED HYPOTHYROIDISM: Primary | ICD-10-CM

## 2024-02-01 DIAGNOSIS — J02.9 ACUTE PHARYNGITIS, UNSPECIFIED ETIOLOGY: ICD-10-CM

## 2024-02-01 DIAGNOSIS — R10.12 LEFT UPPER QUADRANT PAIN: ICD-10-CM

## 2024-02-01 DIAGNOSIS — R13.10 DYSPHAGIA, UNSPECIFIED TYPE: ICD-10-CM

## 2024-02-01 DIAGNOSIS — R79.89 ELEVATED LFTS: ICD-10-CM

## 2024-02-01 LAB
ALBUMIN SERPL BCP-MCNC: 4.2 G/DL (ref 3.5–5.2)
ALP SERPL-CCNC: 65 U/L (ref 55–135)
ALT SERPL W/O P-5'-P-CCNC: 21 U/L (ref 10–44)
ANION GAP SERPL CALC-SCNC: 9 MMOL/L (ref 8–16)
AST SERPL-CCNC: 17 U/L (ref 10–40)
BASOPHILS # BLD AUTO: 0.06 K/UL (ref 0–0.2)
BASOPHILS NFR BLD: 0.5 % (ref 0–1.9)
BILIRUB SERPL-MCNC: 0.8 MG/DL (ref 0.1–1)
BUN SERPL-MCNC: 22 MG/DL (ref 8–23)
CALCIUM SERPL-MCNC: 10.1 MG/DL (ref 8.7–10.5)
CHLORIDE SERPL-SCNC: 105 MMOL/L (ref 95–110)
CO2 SERPL-SCNC: 24 MMOL/L (ref 23–29)
COMPLEXED PSA SERPL-MCNC: 13.9 NG/ML (ref 0–4)
CREAT SERPL-MCNC: 1.1 MG/DL (ref 0.5–1.4)
DIFFERENTIAL METHOD BLD: ABNORMAL
EOSINOPHIL # BLD AUTO: 0.1 K/UL (ref 0–0.5)
EOSINOPHIL NFR BLD: 1.1 % (ref 0–8)
ERYTHROCYTE [DISTWIDTH] IN BLOOD BY AUTOMATED COUNT: 14.4 % (ref 11.5–14.5)
EST. GFR  (NO RACE VARIABLE): >60 ML/MIN/1.73 M^2
FERRITIN SERPL-MCNC: 18 NG/ML (ref 20–300)
GLUCOSE SERPL-MCNC: 103 MG/DL (ref 70–110)
HCT VFR BLD AUTO: 44.3 % (ref 40–54)
HGB BLD-MCNC: 14.3 G/DL (ref 14–18)
IMM GRANULOCYTES # BLD AUTO: 0.02 K/UL (ref 0–0.04)
IMM GRANULOCYTES NFR BLD AUTO: 0.2 % (ref 0–0.5)
LYMPHOCYTES # BLD AUTO: 5.7 K/UL (ref 1–4.8)
LYMPHOCYTES NFR BLD: 50.4 % (ref 18–48)
MCH RBC QN AUTO: 30 PG (ref 27–31)
MCHC RBC AUTO-ENTMCNC: 32.3 G/DL (ref 32–36)
MCV RBC AUTO: 93 FL (ref 82–98)
MONOCYTES # BLD AUTO: 0.8 K/UL (ref 0.3–1)
MONOCYTES NFR BLD: 7.3 % (ref 4–15)
NEUTROPHILS # BLD AUTO: 4.6 K/UL (ref 1.8–7.7)
NEUTROPHILS NFR BLD: 40.5 % (ref 38–73)
NRBC BLD-RTO: 0 /100 WBC
PLATELET # BLD AUTO: 167 K/UL (ref 150–450)
PMV BLD AUTO: 11.1 FL (ref 9.2–12.9)
POTASSIUM SERPL-SCNC: 4.5 MMOL/L (ref 3.5–5.1)
PROT SERPL-MCNC: 7.1 G/DL (ref 6–8.4)
RBC # BLD AUTO: 4.76 M/UL (ref 4.6–6.2)
SODIUM SERPL-SCNC: 138 MMOL/L (ref 136–145)
TSH SERPL DL<=0.005 MIU/L-ACNC: 2.69 UIU/ML (ref 0.4–4)
WBC # BLD AUTO: 11.36 K/UL (ref 3.9–12.7)

## 2024-02-01 PROCEDURE — 99999PBSHW PR PBB SHADOW TECHNICAL ONLY FILED TO HB: Mod: PBBFAC,,,

## 2024-02-01 PROCEDURE — 84443 ASSAY THYROID STIM HORMONE: CPT | Performed by: INTERNAL MEDICINE

## 2024-02-01 PROCEDURE — 84153 ASSAY OF PSA TOTAL: CPT | Performed by: INTERNAL MEDICINE

## 2024-02-01 PROCEDURE — 80053 COMPREHEN METABOLIC PANEL: CPT | Performed by: INTERNAL MEDICINE

## 2024-02-01 PROCEDURE — 96372 THER/PROPH/DIAG INJ SC/IM: CPT | Mod: PBBFAC,PO

## 2024-02-01 PROCEDURE — 82728 ASSAY OF FERRITIN: CPT | Performed by: INTERNAL MEDICINE

## 2024-02-01 PROCEDURE — 85025 COMPLETE CBC W/AUTO DIFF WBC: CPT | Performed by: INTERNAL MEDICINE

## 2024-02-01 PROCEDURE — 36415 COLL VENOUS BLD VENIPUNCTURE: CPT | Mod: PO | Performed by: INTERNAL MEDICINE

## 2024-02-01 PROCEDURE — 99214 OFFICE O/P EST MOD 30 MIN: CPT | Mod: S$PBB,,, | Performed by: INTERNAL MEDICINE

## 2024-02-01 PROCEDURE — 99999 PR PBB SHADOW E&M-EST. PATIENT-LVL III: CPT | Mod: PBBFAC,,, | Performed by: INTERNAL MEDICINE

## 2024-02-01 PROCEDURE — 99213 OFFICE O/P EST LOW 20 MIN: CPT | Mod: PBBFAC,PO | Performed by: INTERNAL MEDICINE

## 2024-02-01 RX ORDER — DIPHENHYDRAMINE HCL 50 MG
CAPSULE ORAL
Qty: 1 CAPSULE | Refills: 0 | Status: SHIPPED | OUTPATIENT
Start: 2024-02-01 | End: 2024-04-23

## 2024-02-01 RX ORDER — OXYBUTYNIN CHLORIDE 10 MG/1
10 TABLET, EXTENDED RELEASE ORAL DAILY
Qty: 90 TABLET | Refills: 3 | Status: SHIPPED | OUTPATIENT
Start: 2024-02-01 | End: 2025-01-31

## 2024-02-01 RX ORDER — FINASTERIDE 5 MG/1
5 TABLET, FILM COATED ORAL DAILY
Qty: 90 TABLET | Refills: 3 | Status: SHIPPED | OUTPATIENT
Start: 2024-02-01

## 2024-02-01 RX ORDER — TAMSULOSIN HYDROCHLORIDE 0.4 MG/1
0.4 CAPSULE ORAL 2 TIMES DAILY
Qty: 180 CAPSULE | Refills: 3 | Status: SHIPPED | OUTPATIENT
Start: 2024-02-01

## 2024-02-01 RX ORDER — PREDNISONE 50 MG/1
TABLET ORAL
Qty: 3 TABLET | Refills: 0 | Status: SHIPPED | OUTPATIENT
Start: 2024-02-01 | End: 2024-04-23

## 2024-02-01 RX ORDER — METHYLPREDNISOLONE ACETATE 40 MG/ML
40 INJECTION, SUSPENSION INTRA-ARTICULAR; INTRALESIONAL; INTRAMUSCULAR; SOFT TISSUE
Status: COMPLETED | OUTPATIENT
Start: 2024-02-01 | End: 2024-02-01

## 2024-02-01 RX ORDER — PANTOPRAZOLE SODIUM 40 MG/1
40 TABLET, DELAYED RELEASE ORAL DAILY
Qty: 90 TABLET | Refills: 3 | Status: SHIPPED | OUTPATIENT
Start: 2024-02-01 | End: 2024-04-23 | Stop reason: SDUPTHER

## 2024-02-01 RX ADMIN — METHYLPREDNISOLONE ACETATE 40 MG: 40 INJECTION, SUSPENSION INTRA-ARTICULAR; INTRALESIONAL; INTRAMUSCULAR; SOFT TISSUE at 09:02

## 2024-02-01 NOTE — PROGRESS NOTES
Subjective     Eligio Richardson is a 79 y.o. old, male here for Laboring and Medication Refill    78 y/o with PMH of CAD, CLL, prostate ca followed by MD linares, BPH, CKD stage 3, hypothyroidism, CLBP    CAD: stable, has days he is very tired or his legs ache and he doesn't feel like doing much. No true angina. Followed by cardiology.  H/o CLL: will recheck CBC today.  Prostate ca: due for PSA, followed by MD linares.  BPH: stable symptoms on multiple meds.  CKD: stable on labs previously.  URI/pharyngitis symptoms the past 24-48 hours, likely from being around sick grandkids.  Dysphagia worsening off daily PPI. Will restart and need EGD later    ROS  Medications     Outpatient Medications Marked as Taking for the 2/1/24 encounter (Office Visit) with Acosta Corley MD   Medication Sig Dispense Refill    aluminum hydrox-magnesium carb 254-237.5 mg/5 mL Susp Take 10 mLs by mouth daily as needed (stomach upset).       aspirin 81 MG Chew Take 81 mg by mouth every other day.      carvediloL (COREG) 6.25 MG tablet Take 1 tablet (6.25 mg total) by mouth 2 (two) times daily with meals. 180 tablet 0    clopidogreL (PLAVIX) 75 mg tablet Take 1 tablet (75 mg total) by mouth once daily. 90 tablet 1    fluorouraciL (EFUDEX) 5 % cream Apply topically 2 (two) times daily. For 2 weeks. 40 g 1    fluticasone propionate (FLONASE) 50 mcg/actuation nasal spray 1 spray by Each Nostril route 2 (two) times daily.      L gasseri/B bifidum/B longum (PROBIOTIC COLON CARE ORAL) Take 1 tablet by mouth once daily.      levothyroxine (SYNTHROID) 88 MCG tablet Take 1 tablet (88 mcg total) by mouth once daily. 90 tablet 0    mag/aluminum/sod bicarb/alginc (GAVISCON ORAL) Take by mouth.      meloxicam (MOBIC) 15 MG tablet Take 1 tablet (15 mg total) by mouth once daily. 90 tablet 3    montelukast (SINGULAIR) 10 mg tablet Take 1 tablet (10 mg total) by mouth once daily. 90 tablet 1    mupirocin (BACTROBAN) 2 % ointment by Nasal route once  daily. 22 g 2    rosuvastatin (CRESTOR) 10 MG tablet Take 1 tablet (10 mg total) by mouth every evening. 90 tablet 0    [DISCONTINUED] finasteride (PROSCAR) 5 mg tablet Take 5 mg by mouth once daily.      [DISCONTINUED] hydroCHLOROthiazide (HYDRODIURIL) 12.5 MG Tab Take 25 mg by mouth once daily.      [DISCONTINUED] HYDROcodone-acetaminophen (NORCO) 5-325 mg per tablet Take 1 tablet by mouth every 4 (four) hours as needed for Pain. 15 tablet 0    [DISCONTINUED] ondansetron (ZOFRAN-ODT) 4 MG TbDL Take 1 tablet (4 mg total) by mouth every 6 (six) hours as needed (Nausea). 10 tablet 0    [DISCONTINUED] oxybutynin (DITROPAN-XL) 10 MG 24 hr tablet Take 1 tablet (10 mg total) by mouth once daily. 90 tablet 2    [DISCONTINUED] pantoprazole (PROTONIX) 40 MG tablet Take 1 tablet twice a day by oral route in the morning for 90 days.      [DISCONTINUED] tamsulosin (FLOMAX) 0.4 mg Cap Take 1 capsule (0.4 mg total) by mouth 2 (two) times a day. 180 capsule 2     Current Facility-Administered Medications for the 2/1/24 encounter (Office Visit) with Acosta Corley MD   Medication Dose Route Frequency Provider Last Rate Last Admin    [COMPLETED] methylPREDNISolone acetate injection 40 mg  40 mg Intramuscular 1 time in Clinic/HOD Acosta Corley MD   40 mg at 02/01/24 0953     Objective     /74   Pulse 62   Wt 88.6 kg (195 lb 7 oz)   SpO2 97%   BMI 28.04 kg/m²   Physical Exam  Constitutional:       General: He is not in acute distress.     Appearance: Normal appearance. He is well-developed.   HENT:      Head: Normocephalic and atraumatic.      Right Ear: Tympanic membrane normal.      Left Ear: Tympanic membrane normal.      Ears:      Comments: Hearing aids  Eyes:      Conjunctiva/sclera: Conjunctivae normal.      Pupils: Pupils are equal, round, and reactive to light.   Neck:      Thyroid: No thyroid mass or thyromegaly.   Cardiovascular:      Rate and Rhythm: Normal rate and regular rhythm.      Heart  sounds: Murmur heard.   Pulmonary:      Effort: No respiratory distress.      Breath sounds: Normal breath sounds.   Abdominal:      General: Bowel sounds are normal.      Palpations: Abdomen is soft.      Tenderness: There is no abdominal tenderness.   Musculoskeletal:         General: No deformity.      Cervical back: Neck supple.   Lymphadenopathy:      Cervical: No cervical adenopathy.      Upper Body:      Right upper body: No supraclavicular adenopathy.      Left upper body: No supraclavicular adenopathy.   Skin:     General: Skin is warm and dry.      Findings: No rash.   Neurological:      Mental Status: He is alert and oriented to person, place, and time.   Psychiatric:         Behavior: Behavior normal.       Assessment and Plan     Acquired hypothyroidism    CLL (chronic lymphocytic leukemia)  -     CBC Auto Differential; Future; Expected date: 02/01/2024    Stage 3a chronic kidney disease    Coronary artery disease involving native coronary artery of native heart without angina pectoris    Labile hypertension    Thrombocytopenia, unspecified    Prostate cancer  -     PROSTATE SPECIFIC ANTIGEN, DIAGNOSTIC; Future; Expected date: 02/01/2024    Overactive bladder  -     oxybutynin (DITROPAN-XL) 10 MG 24 hr tablet; Take 1 tablet (10 mg total) by mouth once daily.  Dispense: 90 tablet; Refill: 3    Left upper quadrant pain  -     CT Abdomen Pelvis With IV Contrast Routine Oral Contrast; Future; Expected date: 02/01/2024    Contrast media allergy  -     predniSONE (DELTASONE) 50 MG Tab; Take 50mg by mouth at 13 hours, 7 hours, and 1 hour before contrast administration.  Dispense: 3 tablet; Refill: 0  -     diphenhydrAMINE (BENADRYL) 50 MG capsule; Take 50mg by mouth 1 hour before contrast administration.  Dispense: 1 capsule; Refill: 0    Dysphagia, unspecified type  -     pantoprazole (PROTONIX) 40 MG tablet; Take 1 tablet (40 mg total) by mouth once daily.  Dispense: 90 tablet; Refill: 3    Benign  non-nodular prostatic hyperplasia without lower urinary tract symptoms  -     tamsulosin (FLOMAX) 0.4 mg Cap; Take 1 capsule (0.4 mg total) by mouth 2 (two) times a day.  Dispense: 180 capsule; Refill: 3    Other orders  -     methylPREDNISolone acetate injection 40 mg  -     finasteride (PROSCAR) 5 mg tablet; Take 1 tablet (5 mg total) by mouth once daily.  Dispense: 90 tablet; Refill: 3        ___________________  Acosta Corley MD  Internal Medicine and Pediatrics

## 2024-02-02 ENCOUNTER — PATIENT MESSAGE (OUTPATIENT)
Dept: FAMILY MEDICINE | Facility: CLINIC | Age: 79
End: 2024-02-02
Payer: MEDICARE

## 2024-02-05 ENCOUNTER — TELEPHONE (OUTPATIENT)
Dept: FAMILY MEDICINE | Facility: CLINIC | Age: 79
End: 2024-02-05
Payer: MEDICARE

## 2024-02-05 NOTE — TELEPHONE ENCOUNTER
----- Message from Ayah Bernal sent at 2/5/2024  2:14 PM CST -----  Regarding: copy of psa  Contact: md milagros maxwell  Type: Needs Medical Advice  Who Called:  [facility] - [facility] linares   Symptoms (please be specific):    How long has patient had these symptoms:    Pharmacy name and phone #:    Best Call Back Number: [phone]  Additional Information: needs copy of pt last results of psa fax: [phone]

## 2024-02-21 ENCOUNTER — TELEPHONE (OUTPATIENT)
Dept: CARDIOLOGY | Facility: CLINIC | Age: 79
End: 2024-02-21
Payer: MEDICARE

## 2024-02-21 NOTE — TELEPHONE ENCOUNTER
Pt having MRI Guided Prostate Biopsy. Local anesthetic   Pt taking aspirin and Plavix    [facility] [place]  P- [phone]  F- [phone], [phone]

## 2024-02-27 ENCOUNTER — CLINICAL SUPPORT (OUTPATIENT)
Dept: AUDIOLOGY | Facility: CLINIC | Age: 79
End: 2024-02-27
Payer: MEDICARE

## 2024-02-27 ENCOUNTER — PATIENT MESSAGE (OUTPATIENT)
Dept: FAMILY MEDICINE | Facility: CLINIC | Age: 79
End: 2024-02-27
Payer: MEDICARE

## 2024-02-27 ENCOUNTER — PROCEDURE VISIT (OUTPATIENT)
Dept: DERMATOLOGY | Facility: CLINIC | Age: 79
End: 2024-02-27
Payer: MEDICARE

## 2024-02-27 DIAGNOSIS — Z97.4 WEARS HEARING AID IN BOTH EARS: Primary | ICD-10-CM

## 2024-02-27 DIAGNOSIS — C44.519 BASAL CELL CARCINOMA (BCC) OF BACK: Primary | ICD-10-CM

## 2024-02-27 PROCEDURE — 11602 EXC TR-EXT MAL+MARG 1.1-2 CM: CPT | Mod: PBBFAC,51,PO | Performed by: DERMATOLOGY

## 2024-02-27 PROCEDURE — 99499 UNLISTED E&M SERVICE: CPT | Mod: S$PBB,,, | Performed by: AUDIOLOGIST-HEARING AID FITTER

## 2024-02-27 PROCEDURE — 12032 INTMD RPR S/A/T/EXT 2.6-7.5: CPT | Mod: PBBFAC,PO | Performed by: DERMATOLOGY

## 2024-02-27 PROCEDURE — 12032 INTMD RPR S/A/T/EXT 2.6-7.5: CPT | Mod: S$PBB,,, | Performed by: DERMATOLOGY

## 2024-02-27 PROCEDURE — 88305 TISSUE EXAM BY PATHOLOGIST: CPT | Mod: PO | Performed by: PATHOLOGY

## 2024-02-27 PROCEDURE — 11602 EXC TR-EXT MAL+MARG 1.1-2 CM: CPT | Mod: S$PBB,51,, | Performed by: DERMATOLOGY

## 2024-02-27 PROCEDURE — 99499 UNLISTED E&M SERVICE: CPT | Mod: S$PBB,,, | Performed by: DERMATOLOGY

## 2024-02-27 PROCEDURE — 88305 TISSUE EXAM BY PATHOLOGIST: CPT | Mod: 26,,, | Performed by: PATHOLOGY

## 2024-02-27 NOTE — PROGRESS NOTES
Eligio Richardson came in on 02/27/2024 for a hearing aid follow up. Pt was alone during today's visit. Pt stated the battery in the right aid is not lasting all day and he is needing to increase the volume to max every day. Update the firmware and changed the receivers to cerustop with instructions on cleaning. He stated they sounded much louder without his normal increase in volume following the update. Scheduled his annual hearing test on 4/17/24. He will use the aids with the new settings and call the clinic PRN.  All complaints were addressed during this visit to the patient's satisfaction. Plan of care was discussed in detail with the patient, who agreed with the plan as above.

## 2024-02-27 NOTE — PROGRESS NOTES
PROCEDURE: Elliptical excision with intermediate layered repair in order to decrease dead space and decrease tension.    ANESTHETIC: 9 cc 1% Xylocaine with Epinephrine 1:100,000, buffered    SURGEON: Avis Berumen MD      ASSISTANTS: Palomo Escalante MA      PREOPERATIVE DIAGNOSIS:  Basal Cell Carcinoma    POSTOPERATIVE DIAGNOSIS:  Same as preoperative diagnosis    PATHOLOGIC DIAGNOSIS: Pending    LOCATION: left back    INITIAL LESION SIZE: 1 cm    EXCISED DIAMETER: 1.8 cm    PREPARATION: The diagnosis, procedure, alternatives, benefits and risks, including but not limited to: infection, bleeding/bruising, drug reactions, pain, scar or cosmetic defect, local sensation disturbances, wound dehiscence (separation of wound edges after sutures removed) and/or recurrence of present condition were explained to the patient. The patient elected to proceed.  Patient's identity was verified using 2 patient identifiers and the side and site was verified.  Time out period with surgeon, assistant and patient in surgical suite was taken.    PROCEDURE: The location noted above was prepped and draped by myself in the usual sterile fashion. The area was anesthetized by myself. Lesional tissue was carefully marked with at least 4 mm margins of clinically normal skin in all directions. A fusiform elliptical excision was done with #15 blade carried down completely through the dermis into the deep subcutaneous tissues to the level of the non-muscle fascia, and dissection was carried out in that plane.  Electrocoagulation was used to obtain hemostasis. Blood loss was minimal. The wound was then approximated in a layered fashion with subcutaneous and intradermal sutures of 3.0 Monocryl, approximately 5 in number, and the wound was then superficially closed with simple interrupted sutures of 4.0 Prolene.    The patient tolerated the procedure well.    The area was cleaned and dressed appropriately and the patient was given wound care  instructions, as well as an appointment for follow-up evaluation.    LENGTH OF REPAIR: 3.3 cm

## 2024-03-04 LAB
FINAL PATHOLOGIC DIAGNOSIS: NORMAL
GROSS: NORMAL
Lab: NORMAL
MICROSCOPIC EXAM: NORMAL

## 2024-03-07 ENCOUNTER — CLINICAL SUPPORT (OUTPATIENT)
Dept: CARDIOLOGY | Facility: CLINIC | Age: 79
End: 2024-03-07
Attending: INTERNAL MEDICINE
Payer: MEDICARE

## 2024-03-07 DIAGNOSIS — I73.9 CLAUDICATION: ICD-10-CM

## 2024-03-07 DIAGNOSIS — R20.9 COLD LEFT FOOT: ICD-10-CM

## 2024-03-07 PROCEDURE — 93925 LOWER EXTREMITY STUDY: CPT | Mod: S$GLB,,, | Performed by: INTERNAL MEDICINE

## 2024-03-08 DIAGNOSIS — R06.02 SOB (SHORTNESS OF BREATH): ICD-10-CM

## 2024-03-08 DIAGNOSIS — I10 PRIMARY HYPERTENSION: ICD-10-CM

## 2024-03-11 LAB
LEFT ANT TIBIAL SYS PSV: 71 CM/S
LEFT CFA PSV: 100 CM/S
LEFT PERONEAL SYS PSV: 60 CM/S
LEFT POPLITEAL PSV: 42 CM/S
LEFT POST TIBIAL SYS PSV: 79 CM/S
LEFT PROFUNDA SYS PSV: 53 CM/S
LEFT SUPER FEMORAL DIST SYS PSV: 59 CM/S
LEFT SUPER FEMORAL MID SYS PSV: 63 CM/S
LEFT SUPER FEMORAL OSTIAL SYS PSV: 8 CM/S
LEFT SUPER FEMORAL PROX SYS PSV: 55 CM/S
LEFT TIB/PER TRUNK SYS PSV: 74 CM/S
OHS CV LEFT LOWER EXTREMITY ABI (NO CALC): 0.8
OHS CV RIGHT ABI LOWER EXTREMITY (NO CALC): 0.8
RIGHT ANT TIBIAL SYS PSV: 57 CM/S
RIGHT CFA PSV: 97 CM/S
RIGHT PERONEAL SYS PSV: 53 CM/S
RIGHT POPLITEAL PSV: 60 CM/S
RIGHT POST TIBIAL SYS PSV: 76 CM/S
RIGHT PROFUNDA SYS PSV: 68 CM/S
RIGHT SUPER FEMORAL DIST SYS PSV: 71 CM/S
RIGHT SUPER FEMORAL MID SYS PSV: 71 CM/S
RIGHT SUPER FEMORAL OSTIAL SYS PSV: 90 CM/S
RIGHT SUPER FEMORAL PROX SYS PSV: 71 CM/S
RIGHT TIB/PER TRUNK SYS PSV: 66 CM/S

## 2024-03-11 RX ORDER — CARVEDILOL 6.25 MG/1
6.25 TABLET ORAL 2 TIMES DAILY WITH MEALS
Qty: 180 TABLET | Refills: 0 | Status: SHIPPED | OUTPATIENT
Start: 2024-03-11 | End: 2024-06-11 | Stop reason: SDUPTHER

## 2024-03-18 DIAGNOSIS — R10.12 ABDOMINAL PAIN, LEFT UPPER QUADRANT: Primary | ICD-10-CM

## 2024-03-19 ENCOUNTER — HOSPITAL ENCOUNTER (OUTPATIENT)
Dept: RADIOLOGY | Facility: HOSPITAL | Age: 79
Discharge: HOME | End: 2024-03-19
Attending: INTERNAL MEDICINE
Payer: MEDICARE

## 2024-03-19 DIAGNOSIS — R10.12 LEFT UPPER QUADRANT PAIN: ICD-10-CM

## 2024-03-19 PROCEDURE — 74177 CT ABD & PELVIS W/CONTRAST: CPT | Mod: TC,PO

## 2024-03-19 PROCEDURE — 74177 CT ABD & PELVIS W/CONTRAST: CPT | Mod: 26,,, | Performed by: RADIOLOGY

## 2024-03-19 PROCEDURE — 25500020 PHARM REV CODE 255: Mod: PO | Performed by: INTERNAL MEDICINE

## 2024-03-19 PROCEDURE — A9698 NON-RAD CONTRAST MATERIALNOC: HCPCS | Mod: PO | Performed by: INTERNAL MEDICINE

## 2024-03-19 RX ADMIN — IOHEXOL 100 ML: 350 INJECTION, SOLUTION INTRAVENOUS at 02:03

## 2024-03-19 RX ADMIN — BARIUM SULFATE 900 ML: 20 SUSPENSION ORAL at 02:03

## 2024-03-25 ENCOUNTER — TELEPHONE (OUTPATIENT)
Dept: HEMATOLOGY/ONCOLOGY | Facility: CLINIC | Age: 79
End: 2024-03-25
Payer: MEDICARE

## 2024-03-25 ENCOUNTER — PATIENT MESSAGE (OUTPATIENT)
Dept: FAMILY MEDICINE | Facility: CLINIC | Age: 79
End: 2024-03-25
Payer: MEDICARE

## 2024-03-25 NOTE — TELEPHONE ENCOUNTER
----- Message from Lilia Lind RN sent at 3/25/2024 12:32 PM CDT -----  Regarding: RE: Incidental Finding  Good Afternoon,  Patient's lung nodule is 7 mm and too small for our lung clinic at the [facility].     ----- Message -----  From: Saranya Ma RT  Sent: 3/25/2024  11:54 AM CDT  To: Barney Oneill; #  Subject: Incidental Finding                               Good morning. The patient had a CT Abdomen/Pelvis with contrast and the radiologist is recommending a CT Chest without contrast within 3-6 months due to the incidental finding of a 7 mm subpleural solid nodule in the right lower lobe. Could you please follow up with the patient? Thank you.

## 2024-03-27 DIAGNOSIS — R91.1 SOLITARY PULMONARY NODULE: Primary | ICD-10-CM

## 2024-03-27 NOTE — TELEPHONE ENCOUNTER
I sent a message to my staff pool box to call him regarding the results. No one has done that yet. Read him the results from his CT scan. He will need to have a CT chest scheduled as well, this is already ordered

## 2024-04-01 DIAGNOSIS — K52.9 COLITIS: Primary | ICD-10-CM

## 2024-04-12 ENCOUNTER — PATIENT MESSAGE (OUTPATIENT)
Dept: GASTROENTEROLOGY | Facility: CLINIC | Age: 79
End: 2024-04-12
Payer: MEDICARE

## 2024-04-15 ENCOUNTER — PATIENT MESSAGE (OUTPATIENT)
Dept: DERMATOLOGY | Facility: CLINIC | Age: 79
End: 2024-04-15
Payer: MEDICARE

## 2024-04-19 ENCOUNTER — E-CONSULT (OUTPATIENT)
Dept: CARDIOLOGY | Facility: CLINIC | Age: 79
End: 2024-04-19
Payer: MEDICARE

## 2024-04-19 ENCOUNTER — OFFICE VISIT (OUTPATIENT)
Dept: GASTROENTEROLOGY | Facility: CLINIC | Age: 79
End: 2024-04-19
Payer: MEDICARE

## 2024-04-19 ENCOUNTER — TELEPHONE (OUTPATIENT)
Dept: GASTROENTEROLOGY | Facility: CLINIC | Age: 79
End: 2024-04-19

## 2024-04-19 VITALS — HEIGHT: 70 IN | BODY MASS INDEX: 28.15 KG/M2 | WEIGHT: 196.63 LBS

## 2024-04-19 DIAGNOSIS — R13.19 ESOPHAGEAL DYSPHAGIA: ICD-10-CM

## 2024-04-19 DIAGNOSIS — K44.9 HIATAL HERNIA: ICD-10-CM

## 2024-04-19 DIAGNOSIS — Z79.01 CURRENT USE OF LONG TERM ANTICOAGULATION: ICD-10-CM

## 2024-04-19 DIAGNOSIS — Z01.810 PREOP CARDIOVASCULAR EXAM: Primary | ICD-10-CM

## 2024-04-19 DIAGNOSIS — R93.3 ABNORMAL CT SCAN, COLON: Primary | ICD-10-CM

## 2024-04-19 DIAGNOSIS — R10.84 GENERALIZED ABDOMINAL PAIN: ICD-10-CM

## 2024-04-19 DIAGNOSIS — K22.2 SCHATZKI'S RING: ICD-10-CM

## 2024-04-19 DIAGNOSIS — K59.09 CHRONIC CONSTIPATION: ICD-10-CM

## 2024-04-19 DIAGNOSIS — K21.9 GASTROESOPHAGEAL REFLUX DISEASE WITHOUT ESOPHAGITIS: ICD-10-CM

## 2024-04-19 DIAGNOSIS — Z79.01 CURRENT USE OF LONG TERM ANTICOAGULATION: Primary | ICD-10-CM

## 2024-04-19 DIAGNOSIS — R14.0 ABDOMINAL BLOATING: ICD-10-CM

## 2024-04-19 PROCEDURE — 99999 PR PBB SHADOW E&M-EST. PATIENT-LVL IV: CPT | Mod: PBBFAC,,,

## 2024-04-19 PROCEDURE — 99214 OFFICE O/P EST MOD 30 MIN: CPT | Mod: S$PBB,,,

## 2024-04-19 PROCEDURE — 99451 NTRPROF PH1/NTRNET/EHR 5/>: CPT | Mod: S$PBB,,, | Performed by: INTERNAL MEDICINE

## 2024-04-19 PROCEDURE — 99214 OFFICE O/P EST MOD 30 MIN: CPT | Mod: PBBFAC,PO

## 2024-04-19 RX ORDER — HYDROCODONE BITARTRATE AND ACETAMINOPHEN 10; 325 MG/1; MG/1
1 TABLET ORAL EVERY 8 HOURS PRN
COMMUNITY

## 2024-04-19 RX ORDER — LUBIPROSTONE 24 UG/1
24 CAPSULE ORAL 2 TIMES DAILY WITH MEALS
Qty: 60 CAPSULE | Refills: 2 | Status: SHIPPED | OUTPATIENT
Start: 2024-04-19 | End: 2024-07-18

## 2024-04-19 NOTE — CONSULTS
[facility] [address]  Response for E-Consult     Patient Name: Eligio Richardson  MRN: [id number]  Primary Care Provider: Acosta Corley MD   Requesting Provider: Ricky Arriola MD  E-Consult to Cardiology  Consult performed by: Brad Mcdonald MD  Consult ordered by: Ricky Arriola MD           78 yo male, E consult for preop clearance of colonoscopy  The chart reviewed.  PMH CAD s/p PCI in 2022, PAD    Plan  Elevated periop risk of CV events for non-high risk procedure.  Ok to proceed the scheduled procedure without further cardiac study.  OK to hold Plavix 5 days and asa 2 days before the procedure and resume postop once hemodynamically stable      Total time of Consultation: 10 minute    I did not speak to the requesting provider verbally about this.     *This eConsult is based on the clinical data available to me and is furnished without benefit of a physical examination. The eConsult will need to be interpreted in light of any clinical issues or changes in patient status not available to me at the time of filing this eConsults. Significant changes in patient condition or level of acuity should result in immediate formal consultation and reevaluation. Please alert me if you have further questions.    Thank you for this eConsult referral.     Brad Mcdonald MD  [facility] [address]

## 2024-04-19 NOTE — PROGRESS NOTES
"Subjective:       Patient ID: Eligio Richardson is a 79 y.o. male Body mass index is 28.22 kg/m².    Chief Complaint: Abnormal Ct Scan    This patient is new to me.  Referring Provider: Dr. Acosta Corley for colitis.  Established patient of Dr. Arriola.  Former patient of Dr. Hawk.     Patient's wife present and assisting with history.  Patient seen to schedule colonoscopy ASAP to evaluate abnormal CT 03/19/2024 prior to treatment for prostate cancer.    GI Problem  The primary symptoms include abdominal pain and dysuria (followed by Urology). Primary symptoms do not include fever, weight loss, fatigue, nausea, vomiting, diarrhea, melena, hematemesis, jaundice or hematochezia.   The abdominal pain began more than 2 days ago (chronic 2 years). The abdominal pain is located in the LUQ, suprapubic region, LLQ and epigastric region. The abdominal pain does not radiate. The severity of the abdominal pain is 0/10 (Denies pain currently; reports LUQ pain/hurt worsens when leaning to the right; worsens with palpation). Relieved by: LUQ pain improves when leaning to the left.   The illness is also significant for dysphagia (Intermittent dysphagia after swallowing certain foods such as bread; feels as though items get stuck in his chest causing discomfort; past EGD with dilation did not improve dysphagia), odynophagia, bloating (Occurs when constipated) and constipation (Chronic constipation; currently having 2-3 BMs a week rated stool 3 and 7 on [place] scale). The illness does not include chills. Associated symptoms comments: hx of chronic constipation by delayed colonic transit; currently taking ultimate colon care OT b.i.d.; has tried fiber, MiraLax, Dulcolax, Linzess 72, 145, and 290 mcg (became ineffective); currently using enema p.r.n. once every 2 weeks. CT abdomen pelvis 03/19/24 - "1. Moderate hiatal hernia and possible gastroesophageal reflux. 2. Fat containing left inguinal hernia 3. Enlarged prostate " "gland 4. Near complete decompression of the colon which likely explains mild diffuse concentric wall prominence throughout the ascending colon and transverse colon.  Infectious colitis and ischemic colitis could theoretically produce a similar appearance but would warrant an appropriate clinical suspicion. 5. Tree in bud micro nodules in the right lower lobe which could relate to infectious/inflammatory bronchiolitis. 6. 7 mm subpleural solid nodule in the right lower lobe, not present at the time of a prior CT of the abdomen and pelvis performed 05/18/2019". Significant associated medical issues include GERD (well controlled taking Protonix 40 mg once daily; history of hiatal hernia). Associated medical issues do not include inflammatory bowel disease, gallstones, liver disease, alcohol abuse, PUD, gastric bypass, bowel resection, irritable bowel syndrome, hemorrhoids or diverticulitis. Associated medical issues comments: Patient reports most recent colonoscopy was Dr. Hawk in 2021 and had benign polyps removed; history of prostate cancer (he will be getting radiation treatments at [facility] after completion of colonoscopy).     Review of Systems   Constitutional:  Negative for activity change, appetite change, chills, diaphoresis, fatigue, fever, unexpected weight change and weight loss.   HENT:  Negative for sore throat and trouble swallowing.    Respiratory:  Negative for cough, choking and shortness of breath.    Cardiovascular:  Negative for chest pain.   Gastrointestinal:  Positive for abdominal pain, bloating (Occurs when constipated), constipation (Chronic constipation; currently having 2-3 BMs a week rated stool 3 and 7 on Davison scale) and dysphagia (Intermittent dysphagia after swallowing certain foods such as bread; feels as though items get stuck in his chest causing discomfort; past EGD with dilation did not improve dysphagia). Negative for abdominal distention, anal bleeding, blood in stool, " diarrhea, hematemesis, hematochezia, jaundice, melena, nausea, rectal pain and vomiting.   Genitourinary:  Positive for dysuria (followed by Urology).       No LMP for male patient.  Past Medical History:   Diagnosis Date    Anticoagulant long-term use     aspirin and plavix    Cancer     skin cancer to face    Cholelithiasis     Constipation, chronic     severe    Coronary artery disease     mitral valve regurgitation, aortic stenosis    Degenerative disc disease, cervical     Gallstones     GERD (gastroesophageal reflux disease)     Hiatal hernia     Hypothyroidism     Inguinal hernia without mention of obstruction or gangrene, unilateral or unspecified, (not specified as recurrent)     Insomnia     Irregular heart beat     currently not an issue and not on any meds for it    Osteoarthritis     Renal disorder     CKD Stage 3a    Sleep apnea     does not use CPAP; wife denies     Past Surgical History:   Procedure Laterality Date    ANGIOGRAM, CORONARY, WITH LEFT HEART CATHETERIZATION N/A 10/18/2022    Procedure: Angiogram, Coronary, with Left Heart Cath;  Surgeon: Joaquin Freedman MD;  [facility] CATH;  Service: Cardiology;  Laterality: N/A;    CHOLECYSTECTOMY      COLONOSCOPY  2013    Dr. Gee    COLONOSCOPY N/A 06/06/2016    Procedure: COLONOSCOPY;  Surgeon: Ricky Arriola MD;  Location: [facility] ENDO;  Service: Endoscopy;  Laterality: N/A;    COLONOSCOPY  07/15/2021    Dr. Hawk    CYSTOSCOPY N/A 06/18/2018    Procedure: CYSTOSCOPY;  Surgeon: Andry Paz MD;  Location: [facility] OR;  Service: Urology;  Laterality: N/A;    CYSTOURETHROSCOPY  10/2019    EGD, WITH BALLOON DILATION N/A 09/21/2021    ELBOW ARTHROPLASTY      ESOPHAGEAL DILATION N/A 02/19/2024    Procedure: DILATION, ESOPHAGUS;  Surgeon: Ricky Arriola MD;  Location: [facility];  Service: Gastroenterology;  Laterality: N/A;    ESOPHAGOGASTRODUODENOSCOPY N/A 02/19/2024    Procedure: EGD (ESOPHAGOGASTRODUODENOSCOPY);  Surgeon: Flako  Ricky OCONNELL MD;  Location: [facility];  Service: Gastroenterology;  Laterality: N/A;    EYE SURGERY Bilateral     cataract    FRACTURE SURGERY Right     knee     FUNCTIONAL ENDOSCOPIC SINUS SURGERY (FESS) USING COMPUTER-ASSISTED NAVIGATION Bilateral 06/09/2023    Procedure: SINUS SURGERY FUNCTIONAL ENDOSCOPIC WITH NAVIGATION;  Surgeon: Dick Hernandez MD;  Location: [facility];  Service: ENT;  Laterality: Bilateral;    HIATAL HERNIA REPAIR  2013    [facility]    KNEE ARTHROSCOPY Right     NASAL SEPTOPLASTY Bilateral 06/09/2023    Procedure: SEPTOPLASTY;  Surgeon: Dick Hernandez MD;  Location: [facility] OR;  Service: ENT;  Laterality: Bilateral;    neck injection  10/2019    NECK SURGERY      prostate biospy      normal per pt    SHOULDER SURGERY Bilateral     SINUS SURGERY  01/2019    Mirna Gill    TONSILLECTOMY      TRANSRECTAL BIOPSY OF PROSTATE WITH ULTRASOUND GUIDANCE N/A 06/18/2018    Procedure: BIOPSY, PROSTATE, TRANSRECTAL APPROACH, WITH US GUIDANCE;  Surgeon: Andry Paz MD;  Location: [facility];  Service: Urology;  Laterality: N/A;    UPPER GASTROINTESTINAL ENDOSCOPY  ~2013     Family History   Problem Relation Name Age of Onset    Lung cancer Mother      Skin cancer Father      Lung cancer Father      Prostate cancer Maternal Uncle      Prostate cancer Paternal Uncle      Colon cancer Neg Hx      Colon polyps Neg Hx      Esophageal cancer Neg Hx      Stomach cancer Neg Hx      Liver cancer Neg Hx      Crohn's disease Neg Hx      Inflammatory bowel disease Neg Hx      Irritable bowel syndrome Neg Hx       Social History     Tobacco Use    Smoking status: Never    Smokeless tobacco: Never   Substance Use Topics    Alcohol use: Yes     Comment: rarely    Drug use: No     Wt Readings from Last 10 Encounters:   04/19/24 89.2 kg (196 lb 10.4 oz)   02/19/24 88.9 kg (195 lb 15.8 oz)   02/01/24 88.6 kg (195 lb 7 oz)   01/31/24 89.7 kg (197 lb 12 oz)   01/11/24 90.7 kg (200 lb)   01/02/24 90.8 kg (200 lb 2.8 oz)    07/26/23 90.8 kg (200 lb 2.8 oz)   07/17/23 92 kg (202 lb 13.2 oz)   06/23/23 92 kg (202 lb 13.2 oz)   06/13/23 92 kg (202 lb 14.9 oz)     Lab Results   Component Value Date    WBC 11.36 02/01/2024    HGB 14.3 02/01/2024    HCT 44.3 02/01/2024    MCV 93 02/01/2024     02/01/2024     CMP  Sodium   Date Value Ref Range Status   02/01/2024 138 136 - 145 mmol/L Final     Potassium   Date Value Ref Range Status   02/01/2024 4.5 3.5 - 5.1 mmol/L Final     Chloride   Date Value Ref Range Status   02/01/2024 105 95 - 110 mmol/L Final     CO2   Date Value Ref Range Status   02/01/2024 24 23 - 29 mmol/L Final     Glucose   Date Value Ref Range Status   02/01/2024 103 70 - 110 mg/dL Final     BUN   Date Value Ref Range Status   02/01/2024 22 8 - 23 mg/dL Final     Creatinine   Date Value Ref Range Status   03/19/2024 1.3 0.5 - 1.4 mg/dL Final     Calcium   Date Value Ref Range Status   02/01/2024 10.1 8.7 - 10.5 mg/dL Final     Total Protein   Date Value Ref Range Status   02/01/2024 7.1 6.0 - 8.4 g/dL Final     Albumin   Date Value Ref Range Status   02/01/2024 4.2 3.5 - 5.2 g/dL Final     Total Bilirubin   Date Value Ref Range Status   02/01/2024 0.8 0.1 - 1.0 mg/dL Final     Comment:     For infants and newborns, interpretation of results should be based  on gestational age, weight and in agreement with clinical  observations.    Premature Infant recommended reference ranges:  Up to 24 hours.............<8.0 mg/dL  Up to 48 hours............<12.0 mg/dL  3-5 days..................<15.0 mg/dL  6-29 days.................<15.0 mg/dL       Alkaline Phosphatase   Date Value Ref Range Status   02/01/2024 65 55 - 135 U/L Final     AST   Date Value Ref Range Status   02/01/2024 17 10 - 40 U/L Final     ALT   Date Value Ref Range Status   02/01/2024 21 10 - 44 U/L Final     Anion Gap   Date Value Ref Range Status   02/01/2024 9 8 - 16 mmol/L Final     eGFR if    Date Value Ref Range Status   12/07/2020 >60  >60 mL/min/1.73 m^2 Final     eGFR if non    Date Value Ref Range Status   12/07/2020 >60 >60 mL/min/1.73 m^2 Final     Comment:     Calculation used to obtain the estimated glomerular filtration  rate (eGFR) is the CKD-EPI equation.        Lab Results   Component Value Date    LIPASERES <10 (L) 04/20/2020    LIPASERES <10 (L) 04/20/2020     Lab Results   Component Value Date    TSH 2.693 02/01/2024     Reviewed prior medical records including radiology report of MRI prostate 04/11/2024, CT abdomen and pelvis 03/19/2024, MRI prostate 08/08/2023, abdominal ultrasound 06/16/2023, abdominal ultrasound 04/20/2020, KUB 04/20/2020, CT urogram 10/23/2019 & endoscopy history (see surgical history).    Objective:      Physical Exam  Vitals and nursing note reviewed.   Constitutional:       General: He is not in acute distress.     Appearance: Normal appearance. He is not ill-appearing.   HENT:      Mouth/Throat:      Lips: Pink. No lesions.   Cardiovascular:      Rate and Rhythm: Normal rate and regular rhythm.      Pulses: Normal pulses.      Heart sounds: Normal heart sounds.   Pulmonary:      Effort: Pulmonary effort is normal. No respiratory distress.      Breath sounds: Normal breath sounds.   Abdominal:      General: Bowel sounds are normal. There is no distension or abdominal bruit. There are no signs of injury.      Palpations: Abdomen is soft. There is no shifting dullness, fluid wave, hepatomegaly, splenomegaly or mass.      Tenderness: There is abdominal tenderness in the periumbilical area, left upper quadrant and left lower quadrant. There is no guarding or rebound. Negative signs include Aguirre's sign, Rovsing's sign and McBurney's sign.   Skin:     General: Skin is warm and dry.      Coloration: Skin is not jaundiced or pale.   Neurological:      Mental Status: He is alert and oriented to person, place, and time.   Psychiatric:         Attention and Perception: Attention normal.         Mood  and Affect: Mood normal.         Speech: Speech normal.         Behavior: Behavior normal.         Assessment:       1. Abnormal CT scan, colon    2. Abdominal bloating    3. Generalized abdominal pain    4. Chronic constipation    5. Esophageal dysphagia    6. History of Schatzki's ring    7. Gastroesophageal reflux disease without esophagitis    8. Hiatal hernia    9. Current use of long term anticoagulation        Plan:       Abnormal CT scan, colon  - schedule Colonoscopy, discussed procedure with the patient, including risks and benefits, patient verbalized understanding    Abdominal bloating  - recommend OTC simethicone as directed, such as Phazyme or Gas-x  - recommend low gas diet: Reduce or eliminate these foods from your diet: Broccoli, Cauliflower, [place] sprouts, Cabbage, Cooked dried beans, Carbonated beverages (sparkling water, soda, beer, champagne)  Other Causes Of Excess Gas Include:   1) EATING TOO FAST or TALKING WHILE YOU CHEW may cause you to swallow air. This increases the amount of gas in the stomach and may worsen your symptoms.  --> Chew each mouthful completely before swallowing. Take your time.  2) OVEREATING may increase the feeling of being bloated and cause more gas.  --> When you are full, stop eating.  3) CONSTIPATION can increase the amount of normal intestinal gas.  --> Avoid constipation by increasing the amount of fiber in your diet by including whole cereal grains, fresh vegetables (except those in the above list) and fresh fruits. High-fiber foods absorb water and carry it out of the body. When increasing the amount of fiber in your diet, you also need to increase the amount of water that you drink. You should drink at least eight 8-ounce glasses of water (two quarts) per day.    Generalized abdominal pain  - schedule Colonoscopy, discussed procedure with the patient, including risks and benefits, patient verbalized understanding    Chronic constipation  -Recommend daily  exercise as tolerated, adequate water intake (six 8-oz glasses of water daily), and high fiber diet. OTC fiber supplements are recommended if diet does not reach daily fiber goal (20-30 grams daily), such as Metamucil, Citrucel, or FiberCon (take as directed, separate from other oral medications by >2 hours).  - START:  lubiprostone (AMITIZA) 24 MCG Cap; Take 1 capsule (24 mcg total) by mouth 2 (two) times daily with meals.  Dispense: 60 capsule; Refill: 2    Esophageal dysphagia & History of Schatzki's ring  - educated patient to eat smaller more frequent meals and to eat slowly and advised to eat a soft diet.  - possible UGI/esophageal manometry if symptoms persist  -     FL Esophagram Complete; Future; Expected date: 04/19/2024    Gastroesophageal reflux disease without esophagitis  -Avoid large meals, avoid eating within 2-3 hours of bedtime (avoid late night eating & lying down soon after eating), elevate head of bed if nocturnal symptoms are present, smoking cessation (if current smoker), & weight loss (if overweight).   -Avoid known foods which trigger reflux symptoms & to minimize/avoid high-fat foods, chocolate, caffeine, citrus, alcohol, & tomato products.  -Avoid/limit use of NSAID's, since they can cause GI upset, bleeding, and/or ulcers. If needed, take with food.  -continue Protonix 40 mg once daily    Hiatal hernia  - usually managed by controlling reflux symptoms, surgery is an option, but usually performed if reflux is uncontrolled by medication management and lifestyle/dietary modifications; if symptoms persist despite medication management and lifestyle/dietary modifications, we can refer to general surgery to consult about surgical options    Current use of long term anticoagulation  - informed patient that the anticoagulant(s) will likely need to be held for endoscopy, nurse will confirm with endoscopist, cardiologist, and/or PCP.    Follow up in about 4 weeks (around 5/17/2024), or if symptoms  worsen or fail to improve.      If no improvement in symptoms or symptoms worsen, call/follow-up at clinic or go to ER.        Total time spent on the encounter includes face to face time and non-face to face time preparing to see the patient (eg, review of tests), Obtaining and/or reviewing separately obtained history, Documenting clinical information in the electronic or other health record, Independently interpreting results (not separately reported) and communicating results to the patient/family/caregiver, or Care coordination (not separately reported).     A dictation software program was used for this note. Please expect some simple typographical  errors in this note.

## 2024-04-22 DIAGNOSIS — R13.19 ESOPHAGEAL DYSPHAGIA: ICD-10-CM

## 2024-04-22 DIAGNOSIS — R93.3 ABNORMAL ESOPHAGRAM: Primary | ICD-10-CM

## 2024-04-22 DIAGNOSIS — K22.4 ESOPHAGEAL DYSMOTILITY: ICD-10-CM

## 2024-04-24 ENCOUNTER — ANESTHESIA (OUTPATIENT)
Dept: ENDOSCOPY | Facility: HOSPITAL | Age: 79
End: 2024-04-24
Payer: MEDICARE

## 2024-04-24 ENCOUNTER — ANESTHESIA EVENT (OUTPATIENT)
Dept: ENDOSCOPY | Facility: HOSPITAL | Age: 79
End: 2024-04-24
Payer: MEDICARE

## 2024-04-24 ENCOUNTER — HOSPITAL ENCOUNTER (OUTPATIENT)
Facility: HOSPITAL | Age: 79
Discharge: HOME | End: 2024-04-24
Attending: INTERNAL MEDICINE | Admitting: INTERNAL MEDICINE
Payer: MEDICARE

## 2024-04-24 VITALS
BODY MASS INDEX: 28.06 KG/M2 | SYSTOLIC BLOOD PRESSURE: 112 MMHG | HEIGHT: 70 IN | WEIGHT: 196 LBS | OXYGEN SATURATION: 98 % | RESPIRATION RATE: 18 BRPM | HEART RATE: 65 BPM | DIASTOLIC BLOOD PRESSURE: 68 MMHG | TEMPERATURE: 98 F

## 2024-04-24 DIAGNOSIS — R93.3 ABNORMAL CT SCAN, COLON: ICD-10-CM

## 2024-04-24 PROCEDURE — 45380 COLONOSCOPY AND BIOPSY: CPT | Mod: PO | Performed by: INTERNAL MEDICINE

## 2024-04-24 PROCEDURE — 88305 TISSUE EXAM BY PATHOLOGIST: CPT | Mod: PO | Performed by: PATHOLOGY

## 2024-04-24 PROCEDURE — 37000009 HC ANESTHESIA EA ADD 15 MINS: Mod: PO | Performed by: INTERNAL MEDICINE

## 2024-04-24 PROCEDURE — 25000003 PHARM REV CODE 250: Mod: PO | Performed by: NURSE ANESTHETIST, CERTIFIED REGISTERED

## 2024-04-24 PROCEDURE — 88305 TISSUE EXAM BY PATHOLOGIST: CPT | Mod: 26,,, | Performed by: PATHOLOGY

## 2024-04-24 PROCEDURE — 27201012 HC FORCEPS, HOT/COLD, DISP: Mod: PO | Performed by: INTERNAL MEDICINE

## 2024-04-24 PROCEDURE — D9220A PRA ANESTHESIA: Mod: CRNA,,, | Performed by: NURSE ANESTHETIST, CERTIFIED REGISTERED

## 2024-04-24 PROCEDURE — 63600175 PHARM REV CODE 636 W HCPCS: Mod: PO | Performed by: INTERNAL MEDICINE

## 2024-04-24 PROCEDURE — 37000008 HC ANESTHESIA 1ST 15 MINUTES: Mod: PO | Performed by: INTERNAL MEDICINE

## 2024-04-24 PROCEDURE — 45380 COLONOSCOPY AND BIOPSY: CPT | Mod: ,,, | Performed by: INTERNAL MEDICINE

## 2024-04-24 PROCEDURE — D9220A PRA ANESTHESIA: Mod: ANES,,, | Performed by: ANESTHESIOLOGY

## 2024-04-24 PROCEDURE — 63600175 PHARM REV CODE 636 W HCPCS: Mod: PO | Performed by: NURSE ANESTHETIST, CERTIFIED REGISTERED

## 2024-04-24 RX ORDER — LIDOCAINE HYDROCHLORIDE 20 MG/ML
INJECTION INTRAVENOUS
Status: DISCONTINUED | OUTPATIENT
Start: 2024-04-24 | End: 2024-04-24

## 2024-04-24 RX ORDER — PROPOFOL 10 MG/ML
INJECTION, EMULSION INTRAVENOUS
Status: DISCONTINUED | OUTPATIENT
Start: 2024-04-24 | End: 2024-04-24

## 2024-04-24 RX ORDER — SODIUM CHLORIDE, SODIUM LACTATE, POTASSIUM CHLORIDE, CALCIUM CHLORIDE 600; 310; 30; 20 MG/100ML; MG/100ML; MG/100ML; MG/100ML
INJECTION, SOLUTION INTRAVENOUS CONTINUOUS
Status: DISCONTINUED | OUTPATIENT
Start: 2024-04-24 | End: 2024-04-24 | Stop reason: HOSPADM

## 2024-04-24 RX ORDER — SODIUM CHLORIDE 0.9 % (FLUSH) 0.9 %
10 SYRINGE (ML) INJECTION
Status: DISCONTINUED | OUTPATIENT
Start: 2024-04-24 | End: 2024-04-24 | Stop reason: HOSPADM

## 2024-04-24 RX ADMIN — PROPOFOL 25 MG: 10 INJECTION, EMULSION INTRAVENOUS at 08:04

## 2024-04-24 RX ADMIN — SODIUM CHLORIDE, POTASSIUM CHLORIDE, SODIUM LACTATE AND CALCIUM CHLORIDE: 600; 310; 30; 20 INJECTION, SOLUTION INTRAVENOUS at 07:04

## 2024-04-24 RX ADMIN — PROPOFOL 25 MG: 10 INJECTION, EMULSION INTRAVENOUS at 09:04

## 2024-04-24 RX ADMIN — PROPOFOL 50 MG: 10 INJECTION, EMULSION INTRAVENOUS at 08:04

## 2024-04-24 RX ADMIN — LIDOCAINE HYDROCHLORIDE 100 MG: 20 INJECTION INTRAVENOUS at 08:04

## 2024-04-24 NOTE — ANESTHESIA PREPROCEDURE EVALUATION
04/24/2024  Eligio Richardson is a 79 y.o., male here for colonoscopy    Mod AS, normal EF       Pre-op Assessment    I have reviewed the Patient Summary Reports.     I have reviewed the Nursing Notes. I have reviewed the NPO Status.   I have reviewed the Medications.     Review of Systems  Anesthesia Hx:  No problems with previous Anesthesia                Social:  Non-Smoker       Hematology/Oncology:  Hematology Normal                                     EENT/Dental:  EENT/Dental Normal           Cardiovascular:  Exercise tolerance: good   Hypertension Valvular problems/Murmurs, AS, MR  CAD   CABG/stent (stent 2022)        hyperlipidemia       Off asa/plavix for nearly 10 days                         Pulmonary:        Sleep Apnea                Renal/:  Chronic Renal Disease, CKD                Hepatic/GI:    Hiatal Hernia, GERD, well controlled   Significant esophageal dysmotility in the recumbent position          Musculoskeletal:  Arthritis               Neurological:  Neurology Normal                                      Endocrine:   Hypothyroidism            ECHO 2023    Left Ventricle: The left ventricle is normal in size. Normal wall thickness. There is concentric remodeling. Normal wall motion. There is normal systolic function. Ejection fraction by visual approximation is 65 -70%. There is normal diastolic function.    Right Ventricle: Normal right ventricular cavity size. Systolic function is normal.    Left Atrium: Left atrium is mildly dilated.    Aortic Valve: Severely calcified cusps. There is mild to moderate AS stenosis. Aortic valve area by VTI is 1.40 cm². Aortic valve peak velocity is 2.41 m/s. Mean gradient is 16 mmHg. The dimensionless index is 0.33. There is mild AI aortic regurgitation.    Mitral Valve: There is mild to moderate regurgitation with a centrally directed jet.     Pulmonary Artery: The estimated pulmonary artery systolic pressure is 19 mmHg.    IVC/SVC: Intermediate venous pressure at 8 mmHg.    Mildly dilated ascending aorta.         Physical Exam  General: Well nourished, Cooperative and Alert    Airway:  Mallampati: II / II  Mouth Opening: Normal  TM Distance: Normal  Tongue: Normal  Neck ROM: Normal ROM    Dental:  Intact, Caps / Implants  Silver caps on back molars   Chest/Lungs:  Clear to auscultation    Heart:  Rate: Normal  Rhythm: Regular Rhythm  Sounds: Normal    Abdomen:  Normal, Soft        Anesthesia Plan  Type of Anesthesia, risks & benefits discussed:    Anesthesia Type: MAC  Intra-op Monitoring Plan: Standard ASA Monitors  Induction:  IV  Informed Consent: Informed consent signed with the Patient and all parties understand the risks and agree with anesthesia plan.  All questions answered.   ASA Score: 3  Day of Surgery Review of History & Physical: H&P Update referred to the surgeon/provider.    Ready For Surgery From Anesthesia Perspective.     .

## 2024-04-24 NOTE — ANESTHESIA POSTPROCEDURE EVALUATION
Anesthesia Post Evaluation    Patient: Eligio Richardson    Procedure(s) Performed: Procedure(s) (LRB):  COLONOSCOPY (N/A)    Final Anesthesia Type: general      Patient location during evaluation: GI PACU  Patient participation: Yes- Able to Participate  Level of consciousness: awake and alert and oriented  Post-procedure vital signs: reviewed and stable  Pain management: adequate  Airway patency: patent    PONV status at discharge: No PONV  Anesthetic complications: no      Cardiovascular status: blood pressure returned to baseline  Respiratory status: room air and unassisted  Hydration status: euvolemic  Follow-up not needed.              Vitals Value Taken Time   /68 04/24/24 0921   Temp 98 04/24/24 0956   Pulse 65 04/24/24 0928   Resp 18 04/24/24 0928   SpO2 98 % 04/24/24 0928         No case tracking events are documented in the log.      Pain/Mellissa Score: Mellissa Score: 10 (4/24/2024  9:28 AM)

## 2024-04-24 NOTE — PROVATION PATIENT INSTRUCTIONS
Discharge Summary/Instructions after an Endoscopic Procedure  Patient Name: Eligio Richardson  Patient MRN: [id number]  Patient YOB: 1945 Wednesday, April 24, 2024  Ricky Arriola MD  Dear patient,  As a result of recent federal legislation (The Federal Cures Act), you may   receive lab or pathology results from your procedure in your [facility]   account before your physician is able to contact you. Your physician or   their representative will relay the results to you with their   recommendations at their soonest availability.  Thank you,  RESTRICTIONS:  During your procedure today, you received medications for sedation.  These   medications may affect your judgment, balance and coordination.  Therefore,   for 24 hours, you have the following restrictions:   - DO NOT drive a car, operate machinery, make legal/financial decisions,   sign important papers or drink alcohol.    ACTIVITY:  Today: no heavy lifting, straining or running due to procedural   sedation/anesthesia.  The following day: return to full activity including work.  DIET:  Eat and drink normally unless instructed otherwise.     TREATMENT FOR COMMON SIDE EFFECTS:  - Mild abdominal pain, nausea, belching, bloating or excessive gas:  rest,   eat lightly and use a heating pad.  - Sore Throat: treat with throat lozenges and/or gargle with warm salt   water.  - Because air was used during the procedure, expelling large amounts of air   from your rectum or belching is normal.  - If a bowel prep was taken, you may not have a bowel movement for 1-3 days.    This is normal.  SYMPTOMS TO WATCH FOR AND REPORT TO YOUR PHYSICIAN:  1. Abdominal pain or bloating, other than gas cramps.  2. Chest pain.  3. Back pain.  4. Signs of infection such as: chills or fever occurring within 24 hours   after the procedure.  5. Rectal bleeding, which would show as bright red, maroon, or black stools.   (A tablespoon of blood from the rectum is not serious, especially  if   hemorrhoids are present.)  6. Vomiting.  7. Weakness or dizziness.  GO DIRECTLY TO THE NEAREST EMERGENCY ROOM IF YOU HAVE ANY OF THE FOLLOWING:      Difficulty breathing              Chills and/or fever over 101 F   Persistent vomiting and/or vomiting blood   Severe abdominal pain   Severe chest pain   Black, tarry stools   Bleeding- more than one tablespoon   Any other symptom or condition that you feel may need urgent attention  Your doctor recommends these additional instructions:  If any biopsies were taken, your doctors clinic will contact you in 1 to 2   weeks with any results.  We are waiting for your pathology results.   Your physician has indicated that a repeat colonoscopy is not recommended   for screening purposes.   You are being discharged to home.  For questions, problems or results please call your physician - Ricky Arriola MD at Work:  [phone].  EMERGENCY PHONE NUMBER: [phone], LAB RESULTS: [phone]  IF A COMPLICATION OR EMERGENCY SITUATION ARISES AND YOU ARE UNABLE TO REACH   YOUR PHYSICIAN - GO DIRECTLY TO THE EMERGENCY ROOM.  ___________________________________________  Nurse Signature  ___________________________________________  Patient/Designated Responsible Party Signature  Ricky Arriola MD  4/24/2024 9:08:08 AM  This report has been verified and signed electronically.  Dear patient,  As a result of recent federal legislation (The Federal Cures Act), you may   receive lab or pathology results from your procedure in your [facility]   account before your physician is able to contact you. Your physician or   their representative will relay the results to you with their   recommendations at their soonest availability.  Thank you.  PROVATION

## 2024-04-24 NOTE — H&P
History & Physical - Short Stay  Gastroenterology      SUBJECTIVE:     Procedure: Colonoscopy    Chief Complaint/Indication for Procedure: Abnormal CT    Current Facility-Administered Medications   Medication Dose Route Frequency Provider Last Rate Last Admin    lactated ringers infusion   Intravenous Continuous Ricky Arriola MD 75 mL/hr at 04/24/24 0730 New Bag at 04/24/24 0730    sodium chloride 0.9% flush 10 mL  10 mL Intravenous PRN Ricky Arriola MD         Facility-Administered Medications Ordered in Other Encounters   Medication Dose Route Frequency Provider Last Rate Last Admin    lactated ringers infusion   Intravenous Continuous Ricky Arriola MD 75 mL/hr at 02/19/24 1111 New Bag at 02/19/24 1111    sodium chloride 0.9% flush 10 mL  10 mL Intravenous PRN Ricky Arriola MD           Review of patient's allergies indicates:   Allergen Reactions    Iodinated contrast media Rash    Myrbetriq [mirabegron] Swelling     Chest pain,nausea, abdominal swelling, decrease erection        Past Medical History:   Diagnosis Date    Anticoagulant long-term use     aspirin and plavix    Cancer     skin cancer to face    Cholelithiasis     Constipation, chronic     severe    Coronary artery disease     mitral valve regurgitation, aortic stenosis    Degenerative disc disease, cervical     Gallstones     GERD (gastroesophageal reflux disease)     Hiatal hernia     Hypothyroidism     Inguinal hernia without mention of obstruction or gangrene, unilateral or unspecified, (not specified as recurrent)     Insomnia     Irregular heart beat     currently not an issue and not on any meds for it    Osteoarthritis     Renal disorder     CKD Stage 3a    Sleep apnea     does not use CPAP; wife denies     Past Surgical History:   Procedure Laterality Date    ANGIOGRAM, CORONARY, WITH LEFT HEART CATHETERIZATION N/A 10/18/2022    Procedure: Angiogram, Coronary, with Left Heart Cath;  Surgeon: Joaquin Freedman MD;   Location: [facility] CATH;  Service: Cardiology;  Laterality: N/A;    CHOLECYSTECTOMY      COLONOSCOPY  2013    Dr. Gee    COLONOSCOPY N/A 06/06/2016    Procedure: COLONOSCOPY;  Surgeon: Ricky Arriola MD;  Location: [facility] ENDO;  Service: Endoscopy;  Laterality: N/A;    COLONOSCOPY  07/15/2021    Dr. Hawk    CYSTOSCOPY N/A 06/18/2018    Procedure: CYSTOSCOPY;  Surgeon: Andry Paz MD;  Location: [facility] OR;  Service: Urology;  Laterality: N/A;    CYSTOURETHROSCOPY  10/2019    EGD, WITH BALLOON DILATION N/A 09/21/2021    ELBOW ARTHROPLASTY      ESOPHAGEAL DILATION N/A 02/19/2024    Procedure: DILATION, ESOPHAGUS;  Surgeon: Ricky Arriola MD;  Location: [facility];  Service: Gastroenterology;  Laterality: N/A;    ESOPHAGOGASTRODUODENOSCOPY N/A 02/19/2024    Procedure: EGD (ESOPHAGOGASTRODUODENOSCOPY);  Surgeon: Ricky Arriola MD;  Location: [facility];  Service: Gastroenterology;  Laterality: N/A;    EYE SURGERY Bilateral     cataract    FRACTURE SURGERY Right     knee     FUNCTIONAL ENDOSCOPIC SINUS SURGERY (FESS) USING COMPUTER-ASSISTED NAVIGATION Bilateral 06/09/2023    Procedure: SINUS SURGERY FUNCTIONAL ENDOSCOPIC WITH NAVIGATION;  Surgeon: Dick Hernandez MD;  Location: [facility];  Service: ENT;  Laterality: Bilateral;    HIATAL HERNIA REPAIR  2013    [facility]    KNEE ARTHROSCOPY Right     NASAL SEPTOPLASTY Bilateral 06/09/2023    Procedure: SEPTOPLASTY;  Surgeon: Dick Hernandez MD;  Location: [facility] OR;  Service: ENT;  Laterality: Bilateral;    neck injection  10/2019    NECK SURGERY      prostate biospy      normal per pt    SHOULDER SURGERY Bilateral     SINUS SURGERY  01/2019    Mirna Gill    TONSILLECTOMY      TRANSRECTAL BIOPSY OF PROSTATE WITH ULTRASOUND GUIDANCE N/A 06/18/2018    Procedure: BIOPSY, PROSTATE, TRANSRECTAL APPROACH, WITH US GUIDANCE;  Surgeon: Andry Paz MD;  Location: [facility] OR;  Service: Urology;  Laterality: N/A;    UPPER GASTROINTESTINAL ENDOSCOPY  ~2013      Family History   Problem Relation Name Age of Onset    Lung cancer Mother      Skin cancer Father      Lung cancer Father      Prostate cancer Maternal Uncle      Prostate cancer Paternal Uncle      Colon cancer Neg Hx      Colon polyps Neg Hx      Esophageal cancer Neg Hx      Stomach cancer Neg Hx      Liver cancer Neg Hx      Crohn's disease Neg Hx      Inflammatory bowel disease Neg Hx      Irritable bowel syndrome Neg Hx       Social History     Tobacco Use    Smoking status: Never    Smokeless tobacco: Never   Substance Use Topics    Alcohol use: Yes     Comment: rarely    Drug use: No         OBJECTIVE:     Vital Signs (Most Recent)  Temp: 98 °F (36.7 °C) (04/24/24 0730)  Pulse: 63 (04/24/24 0730)  Resp: 18 (04/24/24 0730)  BP: (!) 147/75 (04/24/24 0730)  SpO2: 95 % (04/24/24 0730)    Physical Exam:                                                       GENERAL:  Comfortable, in no acute distress.                                 HEENT EXAM:  Nonicteric.  No adenopathy.  Oropharynx is clear.               NECK:  Supple.                                                               LUNGS:  Clear.                                                               CARDIAC:  Regular rate and rhythm.  S1, S2.  No murmur.                      ABDOMEN:  Soft, positive bowel sounds, nontender.  No hepatosplenomegaly or masses.  No rebound or guarding.                                             EXTREMITIES:  No edema.     MENTAL STATUS:  Normal, alert and oriented.      ASSESSMENT/PLAN:     Assessment: Abnormal CT    Plan: Colonoscopy    Anesthesia Plan: General    ASA Grade: ASA 3 - Patient with moderate systemic disease with functional limitations    MALLAMPATI SCORE:  I (soft palate, uvula, fauces, and tonsillar pillars visible)

## 2024-04-24 NOTE — TRANSFER OF CARE
"Anesthesia Transfer of Care Note    Patient: Eligio Richardson    Procedure(s) Performed: Procedure(s) (LRB):  COLONOSCOPY (N/A)    Patient location: PACU    Anesthesia Type: general    Transport from OR: Transported from OR on 2-3 L/min O2 by NC with adequate spontaneous ventilation    Post pain: adequate analgesia    Post assessment: no apparent anesthetic complications and tolerated procedure well    Post vital signs: stable    Level of consciousness: awake and responds to stimulation    Nausea/Vomiting: no nausea/vomiting    Complications: none    Transfer of care protocol was followed      Last vitals: Visit Vitals  BP (!) 147/75 (BP Location: Right arm, Patient Position: Lying)   Pulse 63   Temp 36.7 °C (98 °F) (Skin)   Resp 18   Ht 5' 10" (1.778 m)   Wt 88.9 kg (196 lb)   SpO2 95%   BMI 28.12 kg/m²     "

## 2024-04-24 NOTE — DISCHARGE SUMMARY
[facility] - Endoscopy  Discharge Note  Short Stay  Discharge Note  Short Stay      SUMMARY     Admit Date: 4/24/2024    Attending Physician: Ricky Arriola MD     Discharge Physician: Ricky Arriola MD    Discharge Date: 4/24/2024 9:08 AM    Final Diagnosis: Abnormal CT scan [R93.89]    Disposition: HOME OR SELF CARE    Patient Instructions:   Current Discharge Medication List        CONTINUE these medications which have NOT CHANGED    Details   aluminum hydrox-magnesium carb 254-237.5 mg/5 mL Susp Take 10 mLs by mouth daily as needed (stomach upset).       aspirin 81 MG Chew Take 81 mg by mouth every other day.      carvediloL (COREG) 6.25 MG tablet Take 1 tablet (6.25 mg total) by mouth 2 (two) times daily with meals.  Qty: 180 tablet, Refills: 0    Comments: .  Associated Diagnoses: SOB (shortness of breath); Primary hypertension      clopidogreL (PLAVIX) 75 mg tablet Take 1 tablet (75 mg total) by mouth once daily.  Qty: 90 tablet, Refills: 1    Associated Diagnoses: Coronary artery disease involving native coronary artery of native heart without angina pectoris; Mitral regurgitation and aortic stenosis; Primary hypertension      finasteride (PROSCAR) 5 mg tablet Take 1 tablet (5 mg total) by mouth once daily.  Qty: 90 tablet, Refills: 3      fluorouraciL (EFUDEX) 5 % cream Apply topically 2 (two) times daily. For 2 weeks.  Qty: 40 g, Refills: 1    Comments: This prescription was filled on 7/13/2021. Any refills authorized will be placed on file.  Associated Diagnoses: Sun-damaged skin      fluticasone propionate (FLONASE) 50 mcg/actuation nasal spray 1 spray by Each Nostril route 2 (two) times daily.      HYDROcodone-acetaminophen (NORCO)  mg per tablet Take 1 tablet by mouth every 8 (eight) hours as needed for Pain.      L gasseri/B bifidum/B longum (PROBIOTIC COLON CARE ORAL) Take 1 tablet by mouth once daily.      levothyroxine (SYNTHROID) 88 MCG tablet Take 1 tablet (88 mcg total) by mouth  once daily.  Qty: 90 tablet, Refills: 0    Associated Diagnoses: Acquired hypothyroidism      lubiprostone (AMITIZA) 24 MCG Cap Take 1 capsule (24 mcg total) by mouth 2 (two) times daily with meals.  Qty: 60 capsule, Refills: 2    Associated Diagnoses: Chronic constipation      mag/aluminum/sod bicarb/alginc (GAVISCON ORAL) Take by mouth.      meloxicam (MOBIC) 15 MG tablet Take 1 tablet (15 mg total) by mouth once daily.  Qty: 90 tablet, Refills: 3    Associated Diagnoses: Degenerative disc disease, cervical      mupirocin (BACTROBAN) 2 % ointment by Nasal route once daily.  Qty: 22 g, Refills: 2    Associated Diagnoses: Chronic maxillary sinusitis      oxybutynin (DITROPAN-XL) 10 MG 24 hr tablet Take 1 tablet (10 mg total) by mouth once daily.  Qty: 90 tablet, Refills: 3    Associated Diagnoses: Overactive bladder      pantoprazole (PROTONIX) 40 MG tablet Take 1 tablet (40 mg total) by mouth once daily.  Qty: 30 tablet, Refills: 2      rosuvastatin (CRESTOR) 10 MG tablet Take 1 tablet (10 mg total) by mouth every evening.  Qty: 90 tablet, Refills: 0    Associated Diagnoses: Hypercholesterolemia      tamsulosin (FLOMAX) 0.4 mg Cap Take 1 capsule (0.4 mg total) by mouth 2 (two) times a day.  Qty: 180 capsule, Refills: 3    Associated Diagnoses: Benign non-nodular prostatic hyperplasia without lower urinary tract symptoms             Discharge Procedure Orders (must include Diet, Follow-up, Activity)    Follow Up:  Follow up with PCP as previously scheduled  Resume routine diet.  Activity as tolerated.    No driving day of procedure.

## 2024-04-29 LAB
FINAL PATHOLOGIC DIAGNOSIS: NORMAL
GROSS: NORMAL
Lab: NORMAL

## 2024-05-01 DIAGNOSIS — I08.0 MITRAL REGURGITATION AND AORTIC STENOSIS: Chronic | ICD-10-CM

## 2024-05-01 DIAGNOSIS — I10 PRIMARY HYPERTENSION: ICD-10-CM

## 2024-05-01 DIAGNOSIS — I25.10 CORONARY ARTERY DISEASE INVOLVING NATIVE CORONARY ARTERY OF NATIVE HEART WITHOUT ANGINA PECTORIS: Chronic | ICD-10-CM

## 2024-05-01 RX ORDER — CLOPIDOGREL BISULFATE 75 MG/1
75 TABLET ORAL DAILY
Qty: 90 TABLET | Refills: 1 | Status: SHIPPED | OUTPATIENT
Start: 2024-05-01 | End: 2024-06-11 | Stop reason: SDUPTHER

## 2024-05-27 DIAGNOSIS — E78.00 HYPERCHOLESTEROLEMIA: ICD-10-CM

## 2024-05-29 RX ORDER — ROSUVASTATIN CALCIUM 10 MG/1
10 TABLET, COATED ORAL NIGHTLY
Qty: 90 TABLET | Refills: 0 | Status: SHIPPED | OUTPATIENT
Start: 2024-05-29 | End: 2025-05-29

## 2024-06-11 DIAGNOSIS — I08.0 MITRAL REGURGITATION AND AORTIC STENOSIS: Chronic | ICD-10-CM

## 2024-06-11 DIAGNOSIS — I25.10 CORONARY ARTERY DISEASE INVOLVING NATIVE CORONARY ARTERY OF NATIVE HEART WITHOUT ANGINA PECTORIS: Chronic | ICD-10-CM

## 2024-06-11 DIAGNOSIS — I10 PRIMARY HYPERTENSION: ICD-10-CM

## 2024-06-11 DIAGNOSIS — R06.02 SOB (SHORTNESS OF BREATH): ICD-10-CM

## 2024-06-11 RX ORDER — CLOPIDOGREL BISULFATE 75 MG/1
75 TABLET ORAL DAILY
Qty: 90 TABLET | Refills: 0 | Status: SHIPPED | OUTPATIENT
Start: 2024-06-11

## 2024-06-11 RX ORDER — CARVEDILOL 6.25 MG/1
6.25 TABLET ORAL 2 TIMES DAILY WITH MEALS
Qty: 180 TABLET | Refills: 0 | Status: SHIPPED | OUTPATIENT
Start: 2024-06-11 | End: 2025-06-11

## 2024-06-27 ENCOUNTER — HOSPITAL ENCOUNTER (OUTPATIENT)
Dept: RADIOLOGY | Facility: HOSPITAL | Age: 79
Discharge: HOME | End: 2024-06-27
Attending: INTERNAL MEDICINE
Payer: MEDICARE

## 2024-06-27 DIAGNOSIS — R91.1 SOLITARY PULMONARY NODULE: ICD-10-CM

## 2024-06-27 PROCEDURE — 71250 CT THORAX DX C-: CPT | Mod: TC,PO

## 2024-06-27 PROCEDURE — 71250 CT THORAX DX C-: CPT | Mod: 26,,, | Performed by: RADIOLOGY

## 2024-07-01 ENCOUNTER — OFFICE VISIT (OUTPATIENT)
Dept: FAMILY MEDICINE | Facility: CLINIC | Age: 79
End: 2024-07-01
Payer: MEDICARE

## 2024-07-01 VITALS
SYSTOLIC BLOOD PRESSURE: 116 MMHG | OXYGEN SATURATION: 98 % | BODY MASS INDEX: 28.03 KG/M2 | WEIGHT: 195.31 LBS | DIASTOLIC BLOOD PRESSURE: 68 MMHG | HEART RATE: 58 BPM

## 2024-07-01 DIAGNOSIS — C61 PROSTATE CANCER: Primary | ICD-10-CM

## 2024-07-01 DIAGNOSIS — E78.00 HYPERCHOLESTEROLEMIA: ICD-10-CM

## 2024-07-01 DIAGNOSIS — M50.30 DEGENERATIVE DISC DISEASE, CERVICAL: ICD-10-CM

## 2024-07-01 DIAGNOSIS — E03.9 ACQUIRED HYPOTHYROIDISM: ICD-10-CM

## 2024-07-01 DIAGNOSIS — N32.81 OVERACTIVE BLADDER: ICD-10-CM

## 2024-07-01 PROCEDURE — 99999 PR PBB SHADOW E&M-EST. PATIENT-LVL IV: CPT | Mod: PBBFAC,,, | Performed by: INTERNAL MEDICINE

## 2024-07-01 PROCEDURE — 99214 OFFICE O/P EST MOD 30 MIN: CPT | Mod: PBBFAC,PO | Performed by: INTERNAL MEDICINE

## 2024-07-01 PROCEDURE — 99214 OFFICE O/P EST MOD 30 MIN: CPT | Mod: S$PBB,,, | Performed by: INTERNAL MEDICINE

## 2024-07-01 RX ORDER — LEVOTHYROXINE SODIUM 88 UG/1
88 TABLET ORAL DAILY
Qty: 90 TABLET | Refills: 3 | Status: SHIPPED | OUTPATIENT
Start: 2024-07-01

## 2024-07-01 RX ORDER — FINASTERIDE 5 MG/1
5 TABLET, FILM COATED ORAL DAILY
Qty: 90 TABLET | Refills: 3 | Status: SHIPPED | OUTPATIENT
Start: 2024-07-01

## 2024-07-01 RX ORDER — ROSUVASTATIN CALCIUM 10 MG/1
10 TABLET, COATED ORAL NIGHTLY
Qty: 90 TABLET | Refills: 0 | Status: SHIPPED | OUTPATIENT
Start: 2024-07-01 | End: 2025-07-01

## 2024-07-01 RX ORDER — MELOXICAM 15 MG/1
15 TABLET ORAL DAILY
Qty: 90 TABLET | Refills: 3 | Status: SHIPPED | OUTPATIENT
Start: 2024-07-01

## 2024-07-01 RX ORDER — OXYBUTYNIN CHLORIDE 10 MG/1
10 TABLET, EXTENDED RELEASE ORAL DAILY
Qty: 90 TABLET | Refills: 3 | Status: SHIPPED | OUTPATIENT
Start: 2024-07-01 | End: 2025-07-01

## 2024-07-01 NOTE — PROGRESS NOTES
Subjective     Eligio Richardson is a 79 y.o. old, male here for Annual Exam, Results, and Medication Refill    78 y/o with PMH of CAD, CLL, prostate ca followed by MD linares, BPH, CKD stage 3, hypothyroidism, CLBP     Patient is here for follow-up on chronic medical problems  CLL: counts mostly stable, followed by hematology.  CKD: Stable labs  CLBP and chronic neck pain continue, takes 1 pain pill a day plus NSAID's but tries to limit his use of both of these.  He has a TURP and radiation planned with MD Linares for f/u on his prostate cancer. Already received androgen deprivation therapy. It's been a few years since he has seen a local urologist, will get him one here to f/u after his TURP    Review of Systems   Constitutional:  Positive for malaise/fatigue.        Sometimes has a lot of energy and will get a lot done but mostly fatigued   Cardiovascular: Negative.    Musculoskeletal:  Positive for back pain, joint pain and neck pain.     Medications     Outpatient Medications Marked as Taking for the 7/1/24 encounter (Office Visit) with Acosta Corley MD   Medication Sig Dispense Refill    aluminum hydrox-magnesium carb 254-237.5 mg/5 mL Susp Take 10 mLs by mouth daily as needed (stomach upset).       aspirin 81 MG Chew Take 81 mg by mouth every other day.      carvediloL (COREG) 6.25 MG tablet Take 1 tablet (6.25 mg total) by mouth 2 (two) times daily with meals. 180 tablet 0    clopidogreL (PLAVIX) 75 mg tablet Take 1 tablet (75 mg total) by mouth once daily. 90 tablet 0    fluorouraciL (EFUDEX) 5 % cream Apply topically 2 (two) times daily. For 2 weeks. 40 g 1    fluticasone propionate (FLONASE) 50 mcg/actuation nasal spray 1 spray by Each Nostril route 2 (two) times daily.      HYDROcodone-acetaminophen (NORCO)  mg per tablet Take 1 tablet by mouth every 8 (eight) hours as needed for Pain.      L gasseri/B bifidum/B longum (PROBIOTIC COLON CARE ORAL) Take 1 tablet by mouth once daily.       lubiprostone (AMITIZA) 24 MCG Cap Take 1 capsule (24 mcg total) by mouth 2 (two) times daily with meals. 60 capsule 2    mag/aluminum/sod bicarb/alginc (GAVISCON ORAL) Take by mouth.      mupirocin (BACTROBAN) 2 % ointment by Nasal route once daily. 22 g 2    pantoprazole (PROTONIX) 40 MG tablet Take 1 tablet (40 mg total) by mouth once daily. 30 tablet 2    tamsulosin (FLOMAX) 0.4 mg Cap Take 1 capsule (0.4 mg total) by mouth 2 (two) times a day. 180 capsule 3    [DISCONTINUED] finasteride (PROSCAR) 5 mg tablet Take 1 tablet (5 mg total) by mouth once daily. 90 tablet 3    [DISCONTINUED] levothyroxine (SYNTHROID) 88 MCG tablet Take 1 tablet (88 mcg total) by mouth once daily. 90 tablet 0    [DISCONTINUED] meloxicam (MOBIC) 15 MG tablet Take 1 tablet (15 mg total) by mouth once daily. 90 tablet 3    [DISCONTINUED] oxybutynin (DITROPAN-XL) 10 MG 24 hr tablet Take 1 tablet (10 mg total) by mouth once daily. 90 tablet 3    [DISCONTINUED] rosuvastatin (CRESTOR) 10 MG tablet Take 1 tablet (10 mg total) by mouth every evening. 90 tablet 0     Objective     /68   Pulse (!) 58   Wt 88.6 kg (195 lb 5.2 oz)   SpO2 98%   BMI 28.03 kg/m²   Physical Exam  Constitutional:       General: He is not in acute distress.     Appearance: Normal appearance. He is well-developed.   Cardiovascular:      Rate and Rhythm: Normal rate and regular rhythm.      Heart sounds: Murmur heard.   Pulmonary:      Effort: Pulmonary effort is normal. No respiratory distress.      Breath sounds: Normal breath sounds.   Musculoskeletal:      Right lower leg: No edema.      Left lower leg: No edema.       Assessment and Plan     Prostate cancer  -     Ambulatory referral/consult to Urology; Future; Expected date: 07/08/2024    Acquired hypothyroidism  -     levothyroxine (SYNTHROID) 88 MCG tablet; Take 1 tablet (88 mcg total) by mouth once daily.  Dispense: 90 tablet; Refill: 3    Degenerative disc disease, cervical  -     meloxicam (MOBIC) 15 MG  tablet; Take 1 tablet (15 mg total) by mouth once daily.  Dispense: 90 tablet; Refill: 3    Overactive bladder  -     oxybutynin (DITROPAN-XL) 10 MG 24 hr tablet; Take 1 tablet (10 mg total) by mouth once daily.  Dispense: 90 tablet; Refill: 3    Hypercholesterolemia  -     rosuvastatin (CRESTOR) 10 MG tablet; Take 1 tablet (10 mg total) by mouth every evening.  Dispense: 90 tablet; Refill: 0    Other orders  -     finasteride (PROSCAR) 5 mg tablet; Take 1 tablet (5 mg total) by mouth once daily.  Dispense: 90 tablet; Refill: 3    F/u 6 months with labs if needed    ___________________  Acosta Corley MD  Internal Medicine and Pediatrics

## 2024-07-03 ENCOUNTER — OFFICE VISIT (OUTPATIENT)
Dept: CARDIOLOGY | Facility: CLINIC | Age: 79
End: 2024-07-03
Payer: MEDICARE

## 2024-07-03 VITALS
HEIGHT: 70 IN | BODY MASS INDEX: 28.53 KG/M2 | WEIGHT: 199.31 LBS | HEART RATE: 66 BPM | SYSTOLIC BLOOD PRESSURE: 102 MMHG | DIASTOLIC BLOOD PRESSURE: 58 MMHG

## 2024-07-03 DIAGNOSIS — N18.31 STAGE 3A CHRONIC KIDNEY DISEASE: ICD-10-CM

## 2024-07-03 DIAGNOSIS — R09.89 LABILE HYPERTENSION: ICD-10-CM

## 2024-07-03 DIAGNOSIS — I77.810 ASCENDING AORTA DILATATION: Chronic | ICD-10-CM

## 2024-07-03 DIAGNOSIS — R06.02 SOB (SHORTNESS OF BREATH): ICD-10-CM

## 2024-07-03 DIAGNOSIS — I73.9 PVD (PERIPHERAL VASCULAR DISEASE): ICD-10-CM

## 2024-07-03 DIAGNOSIS — I25.10 CORONARY ARTERY DISEASE INVOLVING NATIVE CORONARY ARTERY OF NATIVE HEART WITHOUT ANGINA PECTORIS: Primary | Chronic | ICD-10-CM

## 2024-07-03 DIAGNOSIS — I71.20 THORACIC AORTIC ANEURYSM WITHOUT RUPTURE, UNSPECIFIED PART: Chronic | ICD-10-CM

## 2024-07-03 DIAGNOSIS — Z79.02 LONG TERM (CURRENT) USE OF ANTITHROMBOTICS/ANTIPLATELETS: ICD-10-CM

## 2024-07-03 DIAGNOSIS — I08.0 MITRAL REGURGITATION AND AORTIC STENOSIS: ICD-10-CM

## 2024-07-03 DIAGNOSIS — I10 PRIMARY HYPERTENSION: Chronic | ICD-10-CM

## 2024-07-03 RX ORDER — CARVEDILOL 6.25 MG/1
6.25 TABLET ORAL 2 TIMES DAILY WITH MEALS
Qty: 180 TABLET | Refills: 0 | Status: SHIPPED | OUTPATIENT
Start: 2024-07-03 | End: 2025-07-03

## 2024-07-03 NOTE — PROGRESS NOTES
Subjective:    Patient ID:  Eligio Richardson is a 79 y.o. male who presents for Coronary artery disease involving native coronary artery of        HPI  DISCUSSED TEST ECHO NORMAL LV FUNCTION MILD-TO-MODERATE AORTIC STENOSIS AORTIC VALVE AREA 1.4 CM2 MILD-TO-MODERATE MR MILDLY DILATED ASCENDING AORTA, RECENT GFR 56, OVERALL DOING OK, NEFF AT TIMES, TO HAVE TURP, ALSO RADIATION AT [facility], FOR PROSTATE CANCER ELEVATED PSA, CHEST CT 4.3 ANEURYSM, ARTERIAL DOPPLER MILD PVD,  SEE ROS    Left Ventricle: The left ventricle is normal in size. Normal wall thickness. There is concentric remodeling. Normal wall motion. There is normal systolic function. Ejection fraction by visual approximation is 65 -70%. There is normal diastolic function.    Right Ventricle: Normal right ventricular cavity size. Systolic function is normal.    Left Atrium: Left atrium is mildly dilated.    Aortic Valve: Severely calcified cusps. There is mild to moderate AS stenosis. Aortic valve area by VTI is 1.40 cm². Aortic valve peak velocity is 2.41 m/s. Mean gradient is 16 mmHg. The dimensionless index is 0.33. There is mild AI aortic regurgitation.    Mitral Valve: There is mild to moderate regurgitation with a centrally directed jet.    Pulmonary Artery: The estimated pulmonary artery systolic pressure is 19 mmHg.    IVC/SVC: Intermediate venous pressure at 8 mmHg.    Mildly dilated ascending aorta.     Past Medical History:   Diagnosis Date    Anticoagulant long-term use     aspirin and plavix    Cancer     skin cancer to face    Cholelithiasis     Constipation, chronic     severe    Coronary artery disease     mitral valve regurgitation, aortic stenosis    Degenerative disc disease, cervical     Gallstones     GERD (gastroesophageal reflux disease)     Hiatal hernia     Hypothyroidism     Inguinal hernia without mention of obstruction or gangrene, unilateral or unspecified, (not specified as recurrent)     Insomnia     Irregular heart beat      currently not an issue and not on any meds for it    Osteoarthritis     Renal disorder     CKD Stage 3a    Sleep apnea     does not use CPAP; wife denies     Past Surgical History:   Procedure Laterality Date    ANGIOGRAM, CORONARY, WITH LEFT HEART CATHETERIZATION N/A 10/18/2022    Procedure: Angiogram, Coronary, with Left Heart Cath;  Surgeon: Joaquin Freedman MD;  Location: [facility] CATH;  Service: Cardiology;  Laterality: N/A;    CHOLECYSTECTOMY      COLONOSCOPY  2013    Dr. Gee    COLONOSCOPY N/A 06/06/2016    Procedure: COLONOSCOPY;  Surgeon: Ricky Arriola MD;  Location: [facility] ENDO;  Service: Endoscopy;  Laterality: N/A;    COLONOSCOPY  07/15/2021    Dr. Hawk    COLONOSCOPY N/A 4/24/2024    Procedure: COLONOSCOPY;  Surgeon: Ricky Arriola MD;  Location: [facility] ENDO;  Service: Gastroenterology;  Laterality: N/A;    CYSTOSCOPY N/A 06/18/2018    Procedure: CYSTOSCOPY;  Surgeon: Andry Paz MD;  Location: [facility] OR;  Service: Urology;  Laterality: N/A;    CYSTOURETHROSCOPY  10/2019    EGD, WITH BALLOON DILATION N/A 09/21/2021    ELBOW ARTHROPLASTY      ESOPHAGEAL DILATION N/A 02/19/2024    Procedure: DILATION, ESOPHAGUS;  Surgeon: Ricky Arriola MD;  Location: [facility] ENDO;  Service: Gastroenterology;  Laterality: N/A;    ESOPHAGOGASTRODUODENOSCOPY N/A 02/19/2024    Procedure: EGD (ESOPHAGOGASTRODUODENOSCOPY);  Surgeon: Ricky Arriola MD;  Location: [facility];  Service: Gastroenterology;  Laterality: N/A;    EYE SURGERY Bilateral     cataract    FRACTURE SURGERY Right     knee     FUNCTIONAL ENDOSCOPIC SINUS SURGERY (FESS) USING COMPUTER-ASSISTED NAVIGATION Bilateral 06/09/2023    Procedure: SINUS SURGERY FUNCTIONAL ENDOSCOPIC WITH NAVIGATION;  Surgeon: Dick Hernandez MD;  Location: [facility] OR;  Service: ENT;  Laterality: Bilateral;    HIATAL HERNIA REPAIR  2013    [facility]    KNEE ARTHROSCOPY Right     NASAL SEPTOPLASTY Bilateral 06/09/2023    Procedure: SEPTOPLASTY;  Surgeon: Dikc Hernandez,  "MD;  Location: [facility] OR;  Service: ENT;  Laterality: Bilateral;    neck injection  10/2019    NECK SURGERY      prostate biospy      normal per pt    SHOULDER SURGERY Bilateral     SINUS SURGERY  01/2019    Mirna Gill    TONSILLECTOMY      TRANSRECTAL BIOPSY OF PROSTATE WITH ULTRASOUND GUIDANCE N/A 06/18/2018    Procedure: BIOPSY, PROSTATE, TRANSRECTAL APPROACH, WITH US GUIDANCE;  Surgeon: Andry Paz MD;  Location: [facility] OR;  Service: Urology;  Laterality: N/A;    UPPER GASTROINTESTINAL ENDOSCOPY  ~2013     Family History   Problem Relation Name Age of Onset    Lung cancer Mother      Skin cancer Father      Lung cancer Father      Prostate cancer Maternal Uncle      Prostate cancer Paternal Uncle      Colon cancer Neg Hx      Colon polyps Neg Hx      Esophageal cancer Neg Hx      Stomach cancer Neg Hx      Liver cancer Neg Hx      Crohn's disease Neg Hx      Inflammatory bowel disease Neg Hx      Irritable bowel syndrome Neg Hx       Social History     Socioeconomic History    Marital status:    Occupational History    Occupation: retired, worked at [employer]     Comment: [place]    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 , Disp: , Rfl:     aspirin 81 MG Chew, Take 81 mg by mouth every other day., Disp: , Rfl:     clopidogreL (PLAVIX) 75 mg tablet, Take 1 tablet (75 mg total) by mouth once daily., Disp: 90 tablet, Rfl: 0    finasteride (PROSCAR) 5 mg tablet, Take " 1 tablet (5 mg total) by mouth once daily., Disp: 90 tablet, Rfl: 3    fluorouraciL (EFUDEX) 5 % cream, Apply topically 2 (two) times daily. For 2 weeks., Disp: 40 g, Rfl: 1    HYDROcodone-acetaminophen (NORCO)  mg per tablet, Take 1 tablet by mouth every 8 (eight) hours as needed for Pain., Disp: , Rfl:     levothyroxine (SYNTHROID) 88 MCG tablet, Take 1 tablet (88 mcg total) by mouth once daily., Disp: 90 tablet, Rfl: 3    mag/aluminum/sod bicarb/alginc (GAVISCON ORAL), Take by mouth., Disp: , Rfl:     meloxicam (MOBIC) 15 MG tablet, Take 1 tablet (15 mg total) by mouth once daily., Disp: 90 tablet, Rfl: 3    oxybutynin (DITROPAN-XL) 10 MG 24 hr tablet, Take 1 tablet (10 mg total) by mouth once daily., Disp: 90 tablet, Rfl: 3    pantoprazole (PROTONIX) 40 MG tablet, Take 1 tablet (40 mg total) by mouth once daily., Disp: 30 tablet, Rfl: 2    rosuvastatin (CRESTOR) 10 MG tablet, Take 1 tablet (10 mg total) by mouth every evening., Disp: 90 tablet, Rfl: 0    tamsulosin (FLOMAX) 0.4 mg Cap, Take 1 capsule (0.4 mg total) by mouth 2 (two) times a day., Disp: 180 capsule, Rfl: 3    carvediloL (COREG) 6.25 MG tablet, Take 1 tablet (6.25 mg total) by mouth 2 (two) times daily with meals., Disp: 180 tablet, Rfl: 0    fluticasone propionate (FLONASE) 50 mcg/actuation nasal spray, 1 spray by Each Nostril route 2 (two) times daily. (Patient not taking: Reported on 7/3/2024), Disp: , Rfl:     L gasseri/B bifidum/B longum (PROBIOTIC COLON CARE ORAL), Take 1 tablet by mouth once daily. (Patient not taking: Reported on 7/3/2024), Disp: , Rfl:     lubiprostone (AMITIZA) 24 MCG Cap, Take 1 capsule (24 mcg total) by mouth 2 (two) times daily with meals. (Patient not taking: Reported on 7/3/2024), Disp: 60 capsule, Rfl: 2    mupirocin (BACTROBAN) 2 % ointment, by Nasal route once daily. (Patient not taking: Reported on 7/3/2024), Disp: 22 g, Rfl: 2  No current facility-administered medications for this  "visit.    Facility-Administered Medications Ordered in Other Visits:     lactated ringers infusion, , Intravenous, Continuous, Ricky Arriola MD, Last Rate: 75 mL/hr at 02/19/24 1111, New Bag at 02/19/24 1111    sodium chloride 0.9% flush 10 mL, 10 mL, Intravenous, PRN, Ricky Arriola MD    Review of Systems   Constitutional: Positive for malaise/fatigue (AT TIMES). Negative for chills, diaphoresis, fever, night sweats and weight loss.   HENT:  Negative for congestion and nosebleeds.    Eyes:  Negative for blurred vision and redness.   Cardiovascular:  Negative for chest pain, claudication, cyanosis, dyspnea on exertion (MILD), irregular heartbeat, leg swelling, near-syncope, orthopnea, palpitations, paroxysmal nocturnal dyspnea and syncope.   Respiratory:  Negative for cough (OCC), hemoptysis, shortness of breath and wheezing.    Hematologic/Lymphatic: Negative for adenopathy. Does not bruise/bleed easily.        LEUKEMIA, PROSTATE CANCER, STABLE   Skin:  Negative for color change and itching.   Musculoskeletal:  Positive for back pain (MILD,CHRONIC, HYDROCODONE) and neck pain. Negative for falls. Muscle weakness: R FOOT.  Gastrointestinal:  Negative for abdominal pain, change in bowel habit, dysphagia, jaundice, melena and nausea.   Genitourinary:  Positive for nocturia. Negative for dysuria and flank pain.   Neurological:  Negative for brief paralysis, focal weakness, loss of balance and weakness. Numbness: L FOOT.  Psychiatric/Behavioral:  Negative for altered mental status and depression.    Allergic/Immunologic: Negative for persistent infections.        Objective:      Vitals:    07/03/24 1306   BP: (!) 102/58   Pulse: 66   Weight: 90.4 kg (199 lb 4.7 oz)   Height: 5' 10" (1.778 m)   PainSc:   5   PainLoc: Neck     Body mass index is 28.6 kg/m².    Physical Exam  Constitutional:       Appearance: Normal appearance.   HENT:      Head: Normocephalic and atraumatic.   Eyes:      General: No scleral " icterus.     Extraocular Movements: Extraocular movements intact.      Conjunctiva/sclera: Conjunctivae normal.   Neck:      Vascular: Normal carotid pulses. No JVD.   Cardiovascular:      Rate and Rhythm: Normal rate and regular rhythm. No extrasystoles are present.     Pulses: Normal pulses.           Carotid pulses are 2+ on the right side and 2+ on the left side.       Radial pulses are 2+ on the right side and 2+ on the left side.        Posterior tibial pulses are 2+ on the right side and 2+ on the left side.      Heart sounds: Murmur heard.      Systolic murmur is present with a grade of 1/6 at the upper right sternal border.      No friction rub. No gallop.   Pulmonary:      Effort: Pulmonary effort is normal. No respiratory distress.      Breath sounds: Normal breath sounds. No rales.   Abdominal:      Palpations: Abdomen is soft.      Tenderness: There is no abdominal tenderness.   Musculoskeletal:      Cervical back: Neck supple.      Right lower leg: No edema.      Left lower leg: No edema.   Skin:     General: Skin is warm and dry.      Capillary Refill: Capillary refill takes less than 2 seconds.   Neurological:      General: No focal deficit present.      Mental Status: He is alert and oriented to person, place, and time.      Cranial Nerves: Cranial nerve deficit (HEARING AIDS) present.   Psychiatric:         Mood and Affect: Mood normal.         Speech: Speech normal.         Behavior: Behavior normal.               ..    Chemistry        Component Value Date/Time     02/01/2024 1003    K 4.5 02/01/2024 1003     02/01/2024 1003    CO2 24 02/01/2024 1003    BUN 22 02/01/2024 1003    CREATININE 1.3 03/19/2024 1317     02/01/2024 1003        Component Value Date/Time    CALCIUM 10.1 02/01/2024 1003    ALKPHOS 65 02/01/2024 1003    AST 17 02/01/2024 1003    ALT 21 02/01/2024 1003    BILITOT 0.8 02/01/2024 1003    ESTGFRAFRICA >60 12/07/2020 0905    EGFRNONAA >60 12/07/2020 0905             ..  Lab Results   Component Value Date    CHOL 117 (L) 03/02/2023    CHOL 154 12/07/2020    CHOL 149 10/19/2018     Lab Results   Component Value Date    HDL 56 03/02/2023    HDL 51 12/07/2020    HDL 52 10/19/2018     Lab Results   Component Value Date    LDLCALC 47.4 (L) 03/02/2023    LDLCALC 88.8 12/07/2020    LDLCALC 83.8 10/19/2018     Lab Results   Component Value Date    TRIG 68 03/02/2023    TRIG 71 12/07/2020    TRIG 66 10/19/2018     Lab Results   Component Value Date    CHOLHDL 47.9 03/02/2023    CHOLHDL 33.1 12/07/2020    CHOLHDL 34.9 10/19/2018     ..  Lab Results   Component Value Date    WBC 11.36 02/01/2024    HGB 14.3 02/01/2024    HCT 44.3 02/01/2024    MCV 93 02/01/2024     02/01/2024       Test(s) Reviewed  I have reviewed the following in detail:  [] Stress test   [] Angiography   [x] Echocardiogram   [x] Labs   [x] Other:       Assessment:         ICD-10-CM ICD-9-CM   1. Coronary artery disease involving native coronary artery of native heart without angina pectoris  I25.10 414.01   2. Mitral regurgitation and aortic stenosis  I08.0 396.2   3. Thoracic aortic aneurysm without rupture, unspecified part  I71.20 441.2   4. Long term (current) use of antithrombotics/antiplatelets  Z79.02 V58.63   5. Labile hypertension  R09.89 401.9   6. Ascending aorta dilatation  I77.810 447.71   7. Stage 3a chronic kidney disease  N18.31 585.3   8. SOB (shortness of breath)  R06.02 786.05   9. Primary hypertension  I10 401.9   10. PVD (peripheral vascular disease)  I73.9 443.9     Problem List Items Addressed This Visit          Pulmonary    SOB (shortness of breath)    Relevant Medications    carvediloL (COREG) 6.25 MG tablet       Cardiac/Vascular    Primary hypertension    Relevant Medications    carvediloL (COREG) 6.25 MG tablet    Mitral regurgitation and aortic stenosis    Ascending aorta dilatation    Coronary artery disease involving native coronary artery of native heart without angina  pectoris - Primary    Labile hypertension    PVD (peripheral vascular disease)    Thoracic aortic aneurysm without rupture       Renal/    Stage 3a chronic kidney disease       Hematology    Long term (current) use of antithrombotics/antiplatelets        Plan:         OK TO HOLD PLAVIX 5 DAYS FOR TURP, NECK INJECTION IF NEEDED, PATIENT SAID THAT HE WAS TOLD HE NEEDS SURGERY HOWEVER HE DECLINED AND MIGHT HAVE REPEAT STEROID INJECTION,ALL CV CLINICALLY STABLE, NO ANGINA, NO OVERT HF, NO TIA, NO CLINICAL ARRHYTHMIA,CONTINUE CURRENT MEDS, EDUCATION, DIET, EXERCISE  , WALKING EXERCISE, HYDRATION DISCUSSED PLAN AT LENGTH WITH THE PATIENT HIS WIFE RETURN TO CLINIC IN 6 MONTHS  Coronary artery disease involving native coronary artery of native heart without angina pectoris    Mitral regurgitation and aortic stenosis  Comments:  STABLE    Thoracic aortic aneurysm without rupture, unspecified part  Comments:  4.3 CM    Long term (current) use of antithrombotics/antiplatelets    Labile hypertension    Ascending aorta dilatation    Stage 3a chronic kidney disease    SOB (shortness of breath)  -     carvediloL (COREG) 6.25 MG tablet; Take 1 tablet (6.25 mg total) by mouth 2 (two) times daily with meals.  Dispense: 180 tablet; Refill: 0    Primary hypertension  -     carvediloL (COREG) 6.25 MG tablet; Take 1 tablet (6.25 mg total) by mouth 2 (two) times daily with meals.  Dispense: 180 tablet; Refill: 0    PVD (peripheral vascular disease)  Comments:  MILD    RTC Low level/low impact aerobic exercise 5x's/wk. Heart healthy diet and risk factor modification.    See labs and med orders.    Aerobic exercise 5x's/wk. Heart healthy diet and risk factor modification.    See labs and med orders.

## 2024-07-09 ENCOUNTER — TELEPHONE (OUTPATIENT)
Dept: CARDIOLOGY | Facility: CLINIC | Age: 79
End: 2024-07-09
Payer: MEDICARE

## 2024-07-09 ENCOUNTER — PATIENT MESSAGE (OUTPATIENT)
Dept: FAMILY MEDICINE | Facility: CLINIC | Age: 79
End: 2024-07-09
Payer: MEDICARE

## 2024-07-09 NOTE — TELEPHONE ENCOUNTER
Pt having cervical interlaminar epidural with dr graham at [facility].  Cv risk and med instructions needed.  Pt taking asa and plavix.  No known cardiac devices.

## 2024-07-10 ENCOUNTER — OFFICE VISIT (OUTPATIENT)
Dept: FAMILY MEDICINE | Facility: CLINIC | Age: 79
End: 2024-07-10
Payer: MEDICARE

## 2024-07-10 ENCOUNTER — LAB VISIT (OUTPATIENT)
Dept: LAB | Facility: HOSPITAL | Age: 79
End: 2024-07-10
Attending: INTERNAL MEDICINE
Payer: MEDICARE

## 2024-07-10 VITALS
BODY MASS INDEX: 28.6 KG/M2 | SYSTOLIC BLOOD PRESSURE: 106 MMHG | HEART RATE: 71 BPM | OXYGEN SATURATION: 97 % | DIASTOLIC BLOOD PRESSURE: 78 MMHG | WEIGHT: 199.31 LBS

## 2024-07-10 DIAGNOSIS — E03.9 ACQUIRED HYPOTHYROIDISM: ICD-10-CM

## 2024-07-10 DIAGNOSIS — C61 PROSTATE CANCER: ICD-10-CM

## 2024-07-10 DIAGNOSIS — N40.1 BENIGN PROSTATIC HYPERPLASIA WITH URINARY RETENTION: ICD-10-CM

## 2024-07-10 DIAGNOSIS — N39.0 ACUTE UTI: ICD-10-CM

## 2024-07-10 DIAGNOSIS — R33.8 BENIGN PROSTATIC HYPERPLASIA WITH URINARY RETENTION: ICD-10-CM

## 2024-07-10 DIAGNOSIS — R79.89 ELEVATED LFTS: ICD-10-CM

## 2024-07-10 DIAGNOSIS — I95.1 ORTHOSTASIS: ICD-10-CM

## 2024-07-10 DIAGNOSIS — C61 PROSTATE CANCER: Primary | ICD-10-CM

## 2024-07-10 PROBLEM — I65.23 CAROTID ARTERY PLAQUE, BILATERAL: Status: RESOLVED | Noted: 2022-10-12 | Resolved: 2024-07-10

## 2024-07-10 PROBLEM — R94.39 ABNORMAL NUCLEAR STRESS TEST: Status: RESOLVED | Noted: 2022-10-12 | Resolved: 2024-07-10

## 2024-07-10 PROBLEM — Z01.810 PREOP CARDIOVASCULAR EXAM: Status: RESOLVED | Noted: 2024-04-19 | Resolved: 2024-07-10

## 2024-07-10 PROBLEM — R20.9 COLD LEFT FOOT: Status: RESOLVED | Noted: 2024-01-31 | Resolved: 2024-07-10

## 2024-07-10 LAB
ALBUMIN SERPL BCP-MCNC: 3.9 G/DL (ref 3.5–5.2)
ALP SERPL-CCNC: 83 U/L (ref 55–135)
ALT SERPL W/O P-5'-P-CCNC: 85 U/L (ref 10–44)
ANION GAP SERPL CALC-SCNC: 5 MMOL/L (ref 8–16)
AST SERPL-CCNC: 56 U/L (ref 10–40)
BASOPHILS # BLD AUTO: 0.03 K/UL (ref 0–0.2)
BASOPHILS NFR BLD: 0.3 % (ref 0–1.9)
BILIRUB SERPL-MCNC: 0.7 MG/DL (ref 0.1–1)
BUN SERPL-MCNC: 25 MG/DL (ref 8–23)
CALCIUM SERPL-MCNC: 10.6 MG/DL (ref 8.7–10.5)
CHLORIDE SERPL-SCNC: 108 MMOL/L (ref 95–110)
CO2 SERPL-SCNC: 28 MMOL/L (ref 23–29)
COMPLEXED PSA SERPL-MCNC: 0.35 NG/ML (ref 0–4)
CREAT SERPL-MCNC: 1.3 MG/DL (ref 0.5–1.4)
DIFFERENTIAL METHOD BLD: ABNORMAL
EOSINOPHIL # BLD AUTO: 0.1 K/UL (ref 0–0.5)
EOSINOPHIL NFR BLD: 1.5 % (ref 0–8)
ERYTHROCYTE [DISTWIDTH] IN BLOOD BY AUTOMATED COUNT: 13 % (ref 11.5–14.5)
EST. GFR  (NO RACE VARIABLE): 55.5 ML/MIN/1.73 M^2
GLUCOSE SERPL-MCNC: 122 MG/DL (ref 70–110)
HCT VFR BLD AUTO: 40.7 % (ref 40–54)
HGB BLD-MCNC: 13.7 G/DL (ref 14–18)
IMM GRANULOCYTES # BLD AUTO: 0.02 K/UL (ref 0–0.04)
IMM GRANULOCYTES NFR BLD AUTO: 0.2 % (ref 0–0.5)
LYMPHOCYTES # BLD AUTO: 4.8 K/UL (ref 1–4.8)
LYMPHOCYTES NFR BLD: 52.5 % (ref 18–48)
MCH RBC QN AUTO: 33.3 PG (ref 27–31)
MCHC RBC AUTO-ENTMCNC: 33.7 G/DL (ref 32–36)
MCV RBC AUTO: 99 FL (ref 82–98)
MONOCYTES # BLD AUTO: 0.8 K/UL (ref 0.3–1)
MONOCYTES NFR BLD: 9 % (ref 4–15)
NEUTROPHILS # BLD AUTO: 3.3 K/UL (ref 1.8–7.7)
NEUTROPHILS NFR BLD: 36.5 % (ref 38–73)
NRBC BLD-RTO: 0 /100 WBC
PLATELET # BLD AUTO: 150 K/UL (ref 150–450)
PMV BLD AUTO: 11.5 FL (ref 9.2–12.9)
POTASSIUM SERPL-SCNC: 5.3 MMOL/L (ref 3.5–5.1)
PROT SERPL-MCNC: 6.9 G/DL (ref 6–8.4)
RBC # BLD AUTO: 4.12 M/UL (ref 4.6–6.2)
SODIUM SERPL-SCNC: 141 MMOL/L (ref 136–145)
TSH SERPL DL<=0.005 MIU/L-ACNC: 1.62 UIU/ML (ref 0.4–4)
WBC # BLD AUTO: 9.16 K/UL (ref 3.9–12.7)

## 2024-07-10 PROCEDURE — 84443 ASSAY THYROID STIM HORMONE: CPT | Performed by: INTERNAL MEDICINE

## 2024-07-10 PROCEDURE — 80053 COMPREHEN METABOLIC PANEL: CPT | Performed by: INTERNAL MEDICINE

## 2024-07-10 PROCEDURE — 84153 ASSAY OF PSA TOTAL: CPT | Performed by: INTERNAL MEDICINE

## 2024-07-10 PROCEDURE — 99999 PR PBB SHADOW E&M-EST. PATIENT-LVL IV: CPT | Mod: PBBFAC,,, | Performed by: INTERNAL MEDICINE

## 2024-07-10 PROCEDURE — 85025 COMPLETE CBC W/AUTO DIFF WBC: CPT | Performed by: INTERNAL MEDICINE

## 2024-07-10 PROCEDURE — 36415 COLL VENOUS BLD VENIPUNCTURE: CPT | Mod: PO | Performed by: INTERNAL MEDICINE

## 2024-07-10 PROCEDURE — 99214 OFFICE O/P EST MOD 30 MIN: CPT | Mod: PBBFAC,PO | Performed by: INTERNAL MEDICINE

## 2024-07-10 RX ORDER — CIPROFLOXACIN 500 MG/1
500 TABLET ORAL 2 TIMES DAILY
Qty: 20 TABLET | Refills: 0 | Status: SHIPPED | OUTPATIENT
Start: 2024-07-10 | End: 2024-07-20

## 2024-07-10 NOTE — PROGRESS NOTES
Subjective     Eligio Richardson is a 79 y.o. old, male here for Labs Only    78 y/o with PMH of CAD, CLL, prostate ca followed by [facility], BPH, CKD stage 3, hypothyroidism, CLBP     Here to get PSA rechecked.  Waiting on a TURP.  The past few days he has had dysuria, urinary frequency (every hour) and incomplete bladder emptying.  BP has been running low, sometimes 90's-100's and having lightheadedness. We discussed lowering his coreg for a few weeks.    Review of Systems   Constitutional:  Positive for malaise/fatigue. Negative for fever.   Genitourinary:  Positive for dysuria, frequency and urgency. Negative for flank pain and hematuria.   Musculoskeletal:  Positive for back pain.     Medications     Outpatient Medications Marked as Taking for the 7/10/24 encounter (Office Visit) with Acosta Corley MD   Medication Sig Dispense Refill    aluminum hydrox-magnesium carb 254-237.5 mg/5 mL Susp Take 10 mLs by mouth daily as needed (stomach upset).       aspirin 81 MG Chew Take 81 mg by mouth every other day.      carvediloL (COREG) 6.25 MG tablet Take 1 tablet (6.25 mg total) by mouth 2 (two) times daily with meals. 180 tablet 0    clopidogreL (PLAVIX) 75 mg tablet Take 1 tablet (75 mg total) by mouth once daily. 90 tablet 0    finasteride (PROSCAR) 5 mg tablet Take 1 tablet (5 mg total) by mouth once daily. 90 tablet 3    fluorouraciL (EFUDEX) 5 % cream Apply topically 2 (two) times daily. For 2 weeks. 40 g 1    fluticasone propionate (FLONASE) 50 mcg/actuation nasal spray 1 spray by Each Nostril route 2 (two) times daily.      HYDROcodone-acetaminophen (NORCO)  mg per tablet Take 1 tablet by mouth every 8 (eight) hours as needed for Pain.      L gasseri/B bifidum/B longum (PROBIOTIC COLON CARE ORAL) Take 1 tablet by mouth once daily.      levothyroxine (SYNTHROID) 88 MCG tablet Take 1 tablet (88 mcg total) by mouth once daily. 90 tablet 3    lubiprostone (AMITIZA) 24 MCG Cap Take 1 capsule (24 mcg  total) by mouth 2 (two) times daily with meals. 60 capsule 2    mag/aluminum/sod bicarb/alginc (GAVISCON ORAL) Take by mouth.      meloxicam (MOBIC) 15 MG tablet Take 1 tablet (15 mg total) by mouth once daily. 90 tablet 3    mupirocin (BACTROBAN) 2 % ointment by Nasal route once daily. 22 g 2    oxybutynin (DITROPAN-XL) 10 MG 24 hr tablet Take 1 tablet (10 mg total) by mouth once daily. 90 tablet 3    pantoprazole (PROTONIX) 40 MG tablet Take 1 tablet (40 mg total) by mouth once daily. 30 tablet 2    rosuvastatin (CRESTOR) 10 MG tablet Take 1 tablet (10 mg total) by mouth every evening. 90 tablet 0    tamsulosin (FLOMAX) 0.4 mg Cap Take 1 capsule (0.4 mg total) by mouth 2 (two) times a day. 180 capsule 3     Objective     /78   Pulse 71   Wt 90.4 kg (199 lb 4.7 oz)   SpO2 97%   BMI 28.60 kg/m²   Physical Exam  Constitutional:       General: He is not in acute distress.     Appearance: Normal appearance. He is well-developed.   Neurological:      Mental Status: He is alert.       Assessment and Plan     Prostate cancer  -     PROSTATE SPECIFIC ANTIGEN, DIAGNOSTIC; Future; Expected date: 07/10/2024    Acute UTI  -     Urine culture  -     Urinalysis; Future; Expected date: 07/10/2024  -     ciprofloxacin HCl (CIPRO) 500 MG tablet; Take 1 tablet (500 mg total) by mouth 2 (two) times daily. for 10 days  Dispense: 20 tablet; Refill: 0    Orthostasis    Benign prostatic hyperplasia with urinary retention    Cut coreg in half and call or message back in 2 weeks.    ___________________  Acosta Corley MD  Internal Medicine and Pediatrics

## 2024-07-25 ENCOUNTER — PATIENT MESSAGE (OUTPATIENT)
Dept: FAMILY MEDICINE | Facility: CLINIC | Age: 79
End: 2024-07-25
Payer: MEDICARE

## 2024-08-16 ENCOUNTER — TELEPHONE (OUTPATIENT)
Dept: CARDIOLOGY | Facility: CLINIC | Age: 79
End: 2024-08-16
Payer: MEDICARE

## 2024-08-16 NOTE — TELEPHONE ENCOUNTER
----- Message from Arcenio Norris sent at 8/16/2024 11:44 AM CDT -----  Contact: wife  Type: Needs Medical Advice  Who Called:  Wife    Pharmacy name and phone #:  [phone]    Best Call Back Number: .[email or web address]  Additional Information: Needs a call ASAP because  At [facility] needs to talk to Dr. Freedman

## 2024-08-16 NOTE — TELEPHONE ENCOUNTER
CV risk for complete electrosurgical resection of prostate by transurethral approach (TURP) on 8/19. Pt taking ASA and Plavix. Last seen on 7/3/24. Anesthesia type not listed.     [facility]  Fax: [phone]

## 2024-08-16 NOTE — TELEPHONE ENCOUNTER
----- Message from Reba Eisenberg sent at 8/16/2024 10:33 AM CDT -----  Regarding: Needs Medical Consult for Clearance  Contact: MD Abel Tian at [phone]  Type: Needs Medical Consult for Clearance  Who Called:  MD Abel Tian at [phone]  Additional Information: Please call and advise. Thank you

## 2024-09-13 ENCOUNTER — OFFICE VISIT (OUTPATIENT)
Dept: FAMILY MEDICINE | Facility: CLINIC | Age: 79
End: 2024-09-13
Payer: MEDICARE

## 2024-09-13 VITALS
OXYGEN SATURATION: 98 % | HEART RATE: 69 BPM | WEIGHT: 199.06 LBS | BODY MASS INDEX: 28.5 KG/M2 | SYSTOLIC BLOOD PRESSURE: 142 MMHG | HEIGHT: 70 IN | TEMPERATURE: 97 F | DIASTOLIC BLOOD PRESSURE: 90 MMHG

## 2024-09-13 DIAGNOSIS — I25.119 CORONARY ARTERY DISEASE INVOLVING NATIVE CORONARY ARTERY OF NATIVE HEART WITH ANGINA PECTORIS: ICD-10-CM

## 2024-09-13 DIAGNOSIS — C91.10 CLL (CHRONIC LYMPHOCYTIC LEUKEMIA): ICD-10-CM

## 2024-09-13 DIAGNOSIS — R06.00 DYSPNEA, UNSPECIFIED TYPE: Primary | ICD-10-CM

## 2024-09-13 DIAGNOSIS — C61 PROSTATE CANCER: ICD-10-CM

## 2024-09-13 PROBLEM — E03.9 HYPOTHYROID: Status: ACTIVE | Noted: 2024-09-13

## 2024-09-13 PROBLEM — I35.0 AORTIC VALVE STENOSIS: Status: ACTIVE | Noted: 2022-10-12

## 2024-09-13 PROBLEM — R07.9 CHEST PAIN: Status: ACTIVE | Noted: 2024-09-13

## 2024-09-13 LAB
OHS QRS DURATION: 84 MS
OHS QTC CALCULATION: 434 MS

## 2024-09-13 PROCEDURE — 99214 OFFICE O/P EST MOD 30 MIN: CPT | Mod: PBBFAC,PO | Performed by: PHYSICIAN ASSISTANT

## 2024-09-13 PROCEDURE — 99999 PR PBB SHADOW E&M-EST. PATIENT-LVL IV: CPT | Mod: PBBFAC,,, | Performed by: PHYSICIAN ASSISTANT

## 2024-09-13 NOTE — PROGRESS NOTES
Subjective     Patient ID: Eligio Richardson is a 79 y.o. male.    Chief Complaint: Breathing Problem      HPI      Pt is new to me, PCP Dr. Corley.     Pt is a 79 year old male with cervical DDD, HTN, mitral regurg, aortic stenosis, CAD with stent, thoracic aortic aneurysm without rupture, HLD, hypothyroidism, CLL + prostate ca (followed by [facility]), GERD, CKD3. He presents today complaining of shortness of breath.   Had TURP 1 month ago. Since then, has noticed breathing more heavily with exertion. Has noticed he cannot get comfortable/breath normally when he is lying flat. Feels generally more weak/tired. Even walking around the house will trigger dyspnea that resolves with 1-2 minutes of rest. Last night, he started with persistent shortness of breath, not able to catch it, even with rest. Reports some chest tightness as well. No nausea or diaphoresis. No new leg swelling. He does state that he cut his coreg dose in half per Dr. Garrison recommendation. When at [facility], they instructed him to hold coreg completely if BP was ever below certain threshold. Because of this, pt has not taken his coreg in the last 3 days. He did take it this morning.       Review of Systems   All other systems reviewed and are negative.         Objective     Physical Exam  Constitutional:       General: He is not in acute distress.     Appearance: Normal appearance. He is not ill-appearing, toxic-appearing or diaphoretic.   HENT:      Head: Normocephalic and atraumatic.   Cardiovascular:      Heart sounds: Murmur (systolic) heard.   Pulmonary:      Effort: No respiratory distress.      Breath sounds: Normal breath sounds.   Abdominal:      General: Bowel sounds are normal. There is no distension.      Palpations: Abdomen is soft. There is no mass.      Tenderness: There is no abdominal tenderness. There is no right CVA tenderness, left CVA tenderness, guarding or rebound.      Hernia: No hernia is present.   Musculoskeletal:       Right lower leg: No edema.      Left lower leg: No edema.   Neurological:      General: No focal deficit present.      Mental Status: He is alert and oriented to person, place, and time.   Psychiatric:         Mood and Affect: Mood normal.         Behavior: Behavior normal.         Thought Content: Thought content normal.         Judgment: Judgment normal.       1. Dyspnea, unspecified type  - IN OFFICE EKG 12-LEAD (to Muse)- t wave inversion in anterior leads, new compared to prior EKG. Given current symptoms, needs ER workup. Wife will drive him to [facility] now. Called ER to give report prior to arrival.     2. Coronary artery disease involving native coronary artery of native heart with angina pectoris  -see above    3. Prostate cancer  4. CLL (chronic lymphocytic leukemia)  -followed by [facility]    RTC/ER precautions given. F/U after discharge    Julianne Mcmanus PA-C

## 2024-09-24 ENCOUNTER — OFFICE VISIT (OUTPATIENT)
Dept: FAMILY MEDICINE | Facility: CLINIC | Age: 79
End: 2024-09-24
Payer: MEDICARE

## 2024-09-24 VITALS
DIASTOLIC BLOOD PRESSURE: 72 MMHG | WEIGHT: 199.06 LBS | SYSTOLIC BLOOD PRESSURE: 128 MMHG | BODY MASS INDEX: 28.56 KG/M2 | OXYGEN SATURATION: 96 % | HEART RATE: 75 BPM

## 2024-09-24 DIAGNOSIS — D69.6 THROMBOCYTOPENIA, UNSPECIFIED: ICD-10-CM

## 2024-09-24 DIAGNOSIS — I35.0 NONRHEUMATIC AORTIC VALVE STENOSIS: ICD-10-CM

## 2024-09-24 DIAGNOSIS — R06.02 SOB (SHORTNESS OF BREATH): ICD-10-CM

## 2024-09-24 DIAGNOSIS — Z79.02 LONG TERM (CURRENT) USE OF ANTITHROMBOTICS/ANTIPLATELETS: ICD-10-CM

## 2024-09-24 DIAGNOSIS — R53.83 FATIGUE, UNSPECIFIED TYPE: Primary | ICD-10-CM

## 2024-09-24 DIAGNOSIS — C61 PROSTATE CANCER: ICD-10-CM

## 2024-09-24 DIAGNOSIS — N32.81 OVERACTIVE BLADDER: ICD-10-CM

## 2024-09-24 DIAGNOSIS — I10 PRIMARY HYPERTENSION: Chronic | ICD-10-CM

## 2024-09-24 PROBLEM — R07.9 CHEST PAIN: Status: RESOLVED | Noted: 2024-09-13 | Resolved: 2024-09-24

## 2024-09-24 PROCEDURE — 99214 OFFICE O/P EST MOD 30 MIN: CPT | Mod: S$PBB,,, | Performed by: INTERNAL MEDICINE

## 2024-09-24 PROCEDURE — G2211 COMPLEX E/M VISIT ADD ON: HCPCS | Mod: S$PBB,,, | Performed by: INTERNAL MEDICINE

## 2024-09-24 PROCEDURE — 99213 OFFICE O/P EST LOW 20 MIN: CPT | Mod: PBBFAC,PO | Performed by: INTERNAL MEDICINE

## 2024-09-24 PROCEDURE — 99999 PR PBB SHADOW E&M-EST. PATIENT-LVL III: CPT | Mod: PBBFAC,,, | Performed by: INTERNAL MEDICINE

## 2024-09-24 RX ORDER — CARVEDILOL 3.12 MG/1
3.12 TABLET ORAL 2 TIMES DAILY WITH MEALS
Qty: 180 TABLET | Refills: 3 | Status: SHIPPED | OUTPATIENT
Start: 2024-09-24 | End: 2025-09-24

## 2024-09-24 RX ORDER — OXYBUTYNIN CHLORIDE 10 MG/1
10 TABLET, EXTENDED RELEASE ORAL DAILY
Qty: 90 TABLET | Refills: 3 | Status: SHIPPED | OUTPATIENT
Start: 2024-09-24 | End: 2025-09-24

## 2024-09-24 NOTE — TELEPHONE ENCOUNTER
No care due was identified.  [facility] Embedded Care Due Messages. Reference number: [id number].   9/24/2024 11:40:30 AM CDT

## 2024-09-24 NOTE — PROGRESS NOTES
Subjective     Eligio Richardson is a 79 y.o. old, male here for Hospital Follow Up    80 y/o with PMH of CAD, moderate AS, CLL, prostate ca followed by [facility] s/p TURP, BPH, CKD stage 3, hypothyroidism, CLBP     Patient is here for hospital follow-up. He was seen by Julianne Mcmanus and instructed to go to the hospital for further work-up.  Hospital records reviewed.  Hospital Course:   The patient was admitted for further evaluation and treatment of chest pain and dyspnea. Cardiology was consulted. TTE showed a preserved EF.  Angiogram performed on 09/16/2024 showed mild areas of stenosis not requiring intervention and widely patent stents. No further chest tightness. Dyspnea improved overall.  PTOT recommended further therapy.     The patient was cleared for discharge by Cardiology.  The patient feels sufficiently improved to return home. The patient will be discharged to home in Good condition. He was told to return to the ER for any chest pain, shortness of breath, fever, or any other concern.  He will have close followup with PCP and Cardiology. The patient, his wife, and Dr. Karimi are in agreement with the discharge plan.    Prior to his hospital admission he had Covid, later had his TURP which was successful at [facility], and then got his Lupron shot.  Over the last several days he feels much improved and his energy level is back up.  Reducing his coreg dose recently was helpful, no more lightheadedness or hypotension.  He questions stopping finasteride after his TURP. He was not instructed on this previously at [facility].    ROS  Medications     Outpatient Medications Marked as Taking for the 9/24/24 encounter (Office Visit) with Acosta Corley MD   Medication Sig Dispense Refill    albuterol (PROVENTIL/VENTOLIN HFA) 90 mcg/actuation inhaler Inhale 2 puffs into the lungs every 6 (six) hours as needed for Shortness of Breath or Wheezing.      aluminum hydrox-magnesium carb 254-237.5 mg/5 mL Susp  Take 10 mLs by mouth daily as needed (stomach upset).       aspirin 81 MG Chew Take 81 mg by mouth every other day.      clopidogreL (PLAVIX) 75 mg tablet Take 1 tablet (75 mg total) by mouth once daily. (Patient taking differently: Take 75 mg by mouth every evening.) 90 tablet 0    fluorouraciL (EFUDEX) 5 % cream Apply topically 2 (two) times daily. For 2 weeks. 40 g 1    fluticasone propionate (FLONASE) 50 mcg/actuation nasal spray 1 spray by Each Nostril route 2 (two) times daily.      HYDROcodone-acetaminophen (NORCO)  mg per tablet Take 1 tablet by mouth every 8 (eight) hours as needed for Pain.      L gasseri/B bifidum/B longum (PROBIOTIC COLON CARE ORAL) Take 1 tablet by mouth once daily.      levothyroxine (SYNTHROID) 88 MCG tablet Take 1 tablet (88 mcg total) by mouth once daily. 90 tablet 3    meloxicam (MOBIC) 15 MG tablet Take 1 tablet (15 mg total) by mouth once daily. 90 tablet 3    pantoprazole (PROTONIX) 40 MG tablet Take 1 tablet (40 mg total) by mouth once daily. 30 tablet 2    rosuvastatin (CRESTOR) 10 MG tablet Take 1 tablet (10 mg total) by mouth every evening. 90 tablet 0    tamsulosin (FLOMAX) 0.4 mg Cap Take 1 capsule (0.4 mg total) by mouth 2 (two) times a day. 180 capsule 3    [DISCONTINUED] carvediloL (COREG) 6.25 MG tablet Take 1 tablet (6.25 mg total) by mouth 2 (two) times daily with meals. 180 tablet 0    [DISCONTINUED] finasteride (PROSCAR) 5 mg tablet Take 1 tablet (5 mg total) by mouth once daily. 90 tablet 3    [DISCONTINUED] oxybutynin (DITROPAN-XL) 10 MG 24 hr tablet Take 1 tablet (10 mg total) by mouth once daily. 90 tablet 3     Objective     /72   Pulse 75   Wt 90.3 kg (199 lb 1.2 oz)   SpO2 96%   BMI 28.56 kg/m²   Physical Exam  Assessment and Plan     Fatigue, unspecified type    Prostate cancer  -     Ambulatory referral/consult to Urology; Future; Expected date: 10/01/2024    SOB (shortness of breath)    Primary hypertension  -     carvediloL (COREG) 3.125  MG tablet; Take 1 tablet (3.125 mg total) by mouth 2 (two) times daily with meals.  Dispense: 180 tablet; Refill: 3    Nonrheumatic aortic valve stenosis    Thrombocytopenia, unspecified    Long term (current) use of antithrombotics/antiplatelets      Suspect multifactorial reasons for his fatigue but most recently his lupron shot, Covid and recovering from his TURP.  Okay to hold finasteride, establish a new urologist locally.  Continue recently reduced dose of coreg.  He restarted mobic - caution given.      ___________________  Acosta Corley MD  Internal Medicine and Pediatrics

## 2024-10-07 ENCOUNTER — OFFICE VISIT (OUTPATIENT)
Dept: UROLOGY | Facility: CLINIC | Age: 79
End: 2024-10-07
Payer: MEDICARE

## 2024-10-07 VITALS — BODY MASS INDEX: 28.85 KG/M2 | HEIGHT: 70 IN | WEIGHT: 201.5 LBS

## 2024-10-07 DIAGNOSIS — Z90.79 S/P TURP: ICD-10-CM

## 2024-10-07 DIAGNOSIS — N40.1 BPH WITH URINARY OBSTRUCTION: ICD-10-CM

## 2024-10-07 DIAGNOSIS — N13.8 BPH WITH URINARY OBSTRUCTION: ICD-10-CM

## 2024-10-07 DIAGNOSIS — C61 PROSTATE CANCER: Primary | ICD-10-CM

## 2024-10-07 PROCEDURE — G2211 COMPLEX E/M VISIT ADD ON: HCPCS | Mod: S$PBB,,, | Performed by: STUDENT IN AN ORGANIZED HEALTH CARE EDUCATION/TRAINING PROGRAM

## 2024-10-07 PROCEDURE — 99999 PR PBB SHADOW E&M-EST. PATIENT-LVL IV: CPT | Mod: PBBFAC,,, | Performed by: STUDENT IN AN ORGANIZED HEALTH CARE EDUCATION/TRAINING PROGRAM

## 2024-10-07 PROCEDURE — 99214 OFFICE O/P EST MOD 30 MIN: CPT | Mod: PBBFAC,PO | Performed by: STUDENT IN AN ORGANIZED HEALTH CARE EDUCATION/TRAINING PROGRAM

## 2024-10-07 PROCEDURE — 99205 OFFICE O/P NEW HI 60 MIN: CPT | Mod: S$PBB,,, | Performed by: STUDENT IN AN ORGANIZED HEALTH CARE EDUCATION/TRAINING PROGRAM

## 2024-10-07 NOTE — PROGRESS NOTES
[facility] - Urology   Clinic Note    Subjective:     Chief Complaint: Prostate Cancer      History of Present Illness:  Eligio Richardson is a 79 y.o. male who presents to clinic for evaluation and management of prostate cancer and BPH. He is new to our clinic referred by Dr. Acosta Corley    He has a history of NCCN low risk prostate cancer on active surveillance which more recently progressed.  Initially diagnosed here then managed at [facility].  He was originally diagnosed with Grade Group 1 disease in 2018. He had confirmatory biopsy in 2019 that also showed Grade Group 1 disease. He had been on watchful waiting approach but his PSA, when corrected for finasteride luis to 24 recently prompting MRI and fusion biopsy on 3/21/2024.  Pathology showed Grade Group 2 disease in 2 of 12 systematic cores as well as Grade Group 2 disease in the region of interest at the left anterior mid gland. The region of interest had up to 11 mm of cancer and suggestion of extraprostatic extension.  NCCN is high risk based on PSA. CT CAP and bone scan from 4/2024 showed no evidence of metastatic disease. MRI showed a median lobe with intravesical component. He was recommended to undergo ADT and EBRT.  MRI showed a median lobe and was recommended to undergo TURP prior to radiation. He has had Lupron x2 - q3 months. Most recent injection was 8/16.  He had significant fatigue from the ADT.  He was given a prescription for Relugolix, but it was cost prohibitive.    He underwent TURP 8/19.  Prostate volume was 52 cc.  His LUTS have improved significantly. Nocturia 1-2x. The stream is much stronger.     Most recent PSA was 0.35 from 7/24    He has a family history of prostate cancer, a KPS of 100, and his comorbidities are CLL, CAD s/p stent 2022, HTN, HLD, CKD.      PSA Diagnostic   Latest Ref Rng 0.00 - 4.00 ng/mL   6/6/2018 6.8 (H)    12/7/2020 7.8 (H)    2/1/2024 13.9 (H)    7/10/2024 0.35      Past medical, family, surgical and  "social history reviewed as documented in chart with pertinent positive medical, family, surgical and social history detailed in HPI.    A review systems was conducted with pertinent positive and negative findings documented in HPI.    Objective:     Estimated body mass index is 28.91 kg/m² as calculated from the following:    Height as of this encounter: 5' 10" (1.778 m).    Weight as of this encounter: 91.4 kg (201 lb 8 oz).    Vital Signs (Most Recent)       Physical Exam  Constitutional:       General: He is not in acute distress.     Appearance: He is not ill-appearing or toxic-appearing.   Pulmonary:      Effort: Pulmonary effort is normal. No accessory muscle usage or respiratory distress.   Neurological:      Mental Status: He is alert.         Labs reviewed below:  Lab Results   Component Value Date    BUN 22 (H) 09/17/2024    CREATININE 0.92 09/17/2024    WBC 6.97 09/17/2024    HGB 12.4 (L) 09/17/2024    HCT 36.7 (L) 09/17/2024     (L) 09/17/2024    AST 44 09/17/2024    ALT 49 09/17/2024    ALKPHOS 61 09/17/2024    ALBUMIN 3.6 09/17/2024    HGBA1C 5.9 (H) 09/13/2024        PSA trend reviewed below:  Lab Results   Component Value Date    PSADIAG 0.35 07/10/2024    PSADIAG 13.9 (H) 02/01/2024    PSADIAG 7.8 (H) 12/07/2020    PSADIAG 6.8 (H) 06/06/2018    PSADIAG 5.7 (H) 01/07/2016    PSADIAG 4.2 (H) 06/08/2015    PSADIAG 4.7 (H) 03/30/2015    PSAFREE 1.16 02/07/2017    PSAFREEPCT 20.35 02/07/2017      Assessment:     1. Prostate cancer    2. BPH with urinary obstruction    3. S/P TURP      Plan:     Extensive chart review from outside notes, pathology, imaging reports.  We reviewed his NCCN risk category and discussed options.  He had significant side effects from ADT, most recent PSA was 0.35 prior to his most recent injection.  May consider a trial without further ADT. Discussed options of continue monitoring vs. Definitive treatment.   Doing well with BPH and LUTS s/p TURP  Referral to Radiation " Oncology for consideration of EBRT    Logan Barrios MD

## 2024-10-15 ENCOUNTER — TELEPHONE (OUTPATIENT)
Dept: CARDIOLOGY | Facility: CLINIC | Age: 79
End: 2024-10-15
Payer: MEDICARE

## 2024-10-15 NOTE — TELEPHONE ENCOUNTER
Pt needing CV risk and medication instructions for PRANAY including inter and trans with Dr Ismael JOHNSON. Pt is taking ASA and Plavix.    Fax: [phone]

## 2024-10-23 ENCOUNTER — OFFICE VISIT (OUTPATIENT)
Dept: CARDIOLOGY | Facility: CLINIC | Age: 79
End: 2024-10-23
Payer: MEDICARE

## 2024-10-23 VITALS
WEIGHT: 200.38 LBS | SYSTOLIC BLOOD PRESSURE: 119 MMHG | DIASTOLIC BLOOD PRESSURE: 68 MMHG | BODY MASS INDEX: 28.69 KG/M2 | HEIGHT: 70 IN | HEART RATE: 72 BPM

## 2024-10-23 DIAGNOSIS — I35.0 MODERATE AORTIC STENOSIS: Primary | Chronic | ICD-10-CM

## 2024-10-23 DIAGNOSIS — R09.89 LABILE HYPERTENSION: ICD-10-CM

## 2024-10-23 DIAGNOSIS — I25.10 CORONARY ARTERY DISEASE INVOLVING NATIVE CORONARY ARTERY OF NATIVE HEART WITHOUT ANGINA PECTORIS: Chronic | ICD-10-CM

## 2024-10-23 DIAGNOSIS — E78.00 HYPERCHOLESTEROLEMIA: Chronic | ICD-10-CM

## 2024-10-23 PROCEDURE — 99214 OFFICE O/P EST MOD 30 MIN: CPT | Mod: S$GLB,,, | Performed by: INTERNAL MEDICINE

## 2024-10-23 RX ORDER — SPIRONOLACTONE 25 MG/1
12.5 TABLET ORAL DAILY
Qty: 45 TABLET | Refills: 1 | Status: SHIPPED | OUTPATIENT
Start: 2024-10-23

## 2024-10-23 NOTE — PROGRESS NOTES
Subjective:    Patient ID:  Eligio Richardson is a 79 y.o. male who presents for Hospital Follow Up and Heart Problem        HPI    S/P [facility] WITH CP, AND SOB AFTER COVID, CATH OK, MOD TO SEVERE AS, DIURESED WELL, BACK PAIN , TO GET INJECTION, TO START RADIATION FOR PROSTATE CANCER, AFTER RECOVERING FROM TURP,LDL 47, SEE ROS   Left Ventricle: The left ventricle is normal in size. Normal wall thickness. There is normal systolic function with a visually estimated ejection fraction of 60 - 65%. There is normal diastolic function.    Right Ventricle: Normal right ventricular cavity size. Wall thickness is normal. Systolic function is normal.    Aortic Valve: There is severe aortic valve sclerosis. There is moderate to severe stenosis. Aortic valve area by VTI is 1.04 cm². Aortic valve peak velocity is 3.22 m/s. Mean gradient is 23 mmHg. The dimensionless index is 0.23.    IVC/SVC: Intermediate venous pressure at 8 mmHg.  Past Medical History:   Diagnosis Date    Anticoagulant long-term use     aspirin and plavix    Cancer     skin cancer to face    Cholelithiasis     Constipation, chronic     severe    Coronary artery disease     mitral valve regurgitation, aortic stenosis    Degenerative disc disease, cervical     Gallstones     GERD (gastroesophageal reflux disease)     Hiatal hernia     Hypothyroidism     Inguinal hernia without mention of obstruction or gangrene, unilateral or unspecified, (not specified as recurrent)     Insomnia     Irregular heart beat     currently not an issue and not on any meds for it    Osteoarthritis     Renal disorder     CKD Stage 3a    Sleep apnea     does not use CPAP; wife denies     Past Surgical History:   Procedure Laterality Date    ANGIOGRAM, CORONARY, WITH LEFT HEART CATHETERIZATION N/A 10/18/2022    Procedure: Angiogram, Coronary, with Left Heart Cath;  Surgeon: Joaquin Freedman MD;  Location: [facility] CATH;  Service: Cardiology;  Laterality: N/A;    CHOLECYSTECTOMY      COLONOSCOPY   2013    Dr. Gee    COLONOSCOPY N/A 06/06/2016    Procedure: COLONOSCOPY;  Surgeon: Ricky Arriola MD;  Location: [facility];  Service: Endoscopy;  Laterality: N/A;    COLONOSCOPY  07/15/2021    Dr. Hawk    COLONOSCOPY N/A 4/24/2024    Procedure: COLONOSCOPY;  Surgeon: Ricky Arriola MD;  Location: [facility];  Service: Gastroenterology;  Laterality: N/A;    CYSTOSCOPY N/A 06/18/2018    Procedure: CYSTOSCOPY;  Surgeon: Andry Paz MD;  Location: [facility];  Service: Urology;  Laterality: N/A;    CYSTOURETHROSCOPY  10/2019    EGD, WITH BALLOON DILATION N/A 09/21/2021    ELBOW ARTHROPLASTY      ESOPHAGEAL DILATION N/A 02/19/2024    Procedure: DILATION, ESOPHAGUS;  Surgeon: Ricky Arriola MD;  Location: [facility];  Service: Gastroenterology;  Laterality: N/A;    ESOPHAGOGASTRODUODENOSCOPY N/A 02/19/2024    Procedure: EGD (ESOPHAGOGASTRODUODENOSCOPY);  Surgeon: Ricky Arriola MD;  Location: [facility];  Service: Gastroenterology;  Laterality: N/A;    EYE SURGERY Bilateral     cataract    FRACTURE SURGERY Right     knee     FUNCTIONAL ENDOSCOPIC SINUS SURGERY (FESS) USING COMPUTER-ASSISTED NAVIGATION Bilateral 06/09/2023    Procedure: SINUS SURGERY FUNCTIONAL ENDOSCOPIC WITH NAVIGATION;  Surgeon: Dick Hernandez MD;  Location: [facility];  Service: ENT;  Laterality: Bilateral;    HIATAL HERNIA REPAIR  2013    [facility]    KNEE ARTHROSCOPY Right     LEFT HEART CATHETERIZATION Right 9/16/2024    Procedure: Left heart cath 3621;  Surgeon: Aj Montano MD;  Location: [facility] CATH;  Service: Cardiology;  Laterality: Right;    NASAL SEPTOPLASTY Bilateral 06/09/2023    Procedure: SEPTOPLASTY;  Surgeon: Dick Hernandez MD;  Location: [facility] OR;  Service: ENT;  Laterality: Bilateral;    neck injection  10/2019    NECK SURGERY      prostate biospy      normal per pt    SHOULDER SURGERY Bilateral     SINUS SURGERY  01/2019    Mirna Gill    TONSILLECTOMY      TRANSRECTAL BIOPSY OF PROSTATE WITH  "ULTRASOUND GUIDANCE N/A 06/18/2018    Procedure: BIOPSY, PROSTATE, TRANSRECTAL APPROACH, WITH US GUIDANCE;  Surgeon: Andry Paz MD;  Location: [facility] OR;  Service: Urology;  Laterality: N/A;    UPPER GASTROINTESTINAL ENDOSCOPY  ~2013     Family History   Problem Relation Name Age of Onset    Lung cancer Mother      Skin cancer Father      Lung cancer Father      Prostate cancer Maternal Uncle      Prostate cancer Paternal Uncle      Colon cancer Neg Hx      Colon polyps Neg Hx      Esophageal cancer Neg Hx      Stomach cancer Neg Hx      Liver cancer Neg Hx      Crohn's disease Neg Hx      Inflammatory bowel disease Neg Hx      Irritable bowel syndrome Neg Hx       Social History     Socioeconomic History    Marital status:    Occupational History    Occupation: retired, worked at [employer]     Comment: [employer]    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"   Tobacco Use    Smoking status: Never    Smokeless tobacco: Never   Substance and Sexual Activity    Alcohol use: Yes     Comment: rarely    Drug use: No    Sexual activity: Yes     Partners: Female     Social Drivers of Health     Financial Resource Strain: Low Risk  (9/30/2024)    Overall Financial Resource Strain (CARDIA)     Difficulty of Paying Living Expenses: Not hard at all   Food Insecurity: No Food Insecurity (9/30/2024)    Hunger Vital Sign     Worried About Running Out of Food in the Last Year: Never true     Ran Out of Food in the Last Year: Never true   Transportation Needs: No Transportation Needs (9/14/2024)    TRANSPORTATION NEEDS     Transportation : No   Physical Activity: Insufficiently Active (9/30/2024)    Exercise Vital Sign     Days of Exercise per Week: 3 days     Minutes of Exercise per Session: 30 min   Stress: No Stress Concern Present (9/30/2024)    [employer] [place] [employer] - Occupational Stress Questionnaire     Feeling of Stress : Not at all   Housing Stability: Low Risk  (9/30/2024)    Housing Stability " Vital Sign     Unable to Pay for Housing in the Last Year: No     Homeless in the Last Year: No       Review of patient's allergies indicates:   Allergen Reactions    Myrbetriq [mirabegron] Swelling     Chest pain,nausea, abdominal swelling, decrease erection       Current Outpatient Medications:     albuterol (PROVENTIL/VENTOLIN HFA) 90 mcg/actuation inhaler, Inhale 2 puffs into the lungs every 6 (six) hours as needed for Shortness of Breath or Wheezing., Disp: , Rfl:     aluminum hydrox-magnesium carb 254-237.5 mg/5 mL Susp, Take 10 mLs by mouth daily as needed (stomach upset). , Disp: , Rfl:     aspirin 81 MG Chew, Take 81 mg by mouth every other day., Disp: , Rfl:     carvediloL (COREG) 3.125 MG tablet, Take 1 tablet (3.125 mg total) by mouth 2 (two) times daily with meals., Disp: 180 tablet, Rfl: 3    fluorouraciL (EFUDEX) 5 % cream, Apply topically 2 (two) times daily. For 2 weeks., Disp: 40 g, Rfl: 1    fluticasone propionate (FLONASE) 50 mcg/actuation nasal spray, 1 spray by Each Nostril route 2 (two) times daily., Disp: , Rfl:     HYDROcodone-acetaminophen (NORCO)  mg per tablet, Take 1 tablet by mouth every 8 (eight) hours as needed for Pain., Disp: , Rfl:     L gasseri/B bifidum/B longum (PROBIOTIC COLON CARE ORAL), Take 1 tablet by mouth once daily., Disp: , Rfl:     leuprolide (LUPRON) 22.5 mg injection, Inject 22.5 mg into the muscle every 3 (three) months., Disp: , Rfl:     levothyroxine (SYNTHROID) 88 MCG tablet, Take 1 tablet (88 mcg total) by mouth once daily., Disp: 90 tablet, Rfl: 3    oxybutynin (DITROPAN-XL) 10 MG 24 hr tablet, Take 1 tablet (10 mg total) by mouth once daily., Disp: 90 tablet, Rfl: 3    pantoprazole (PROTONIX) 40 MG tablet, Take 1 tablet (40 mg total) by mouth once daily., Disp: 30 tablet, Rfl: 2    rosuvastatin (CRESTOR) 10 MG tablet, Take 1 tablet (10 mg total) by mouth every evening., Disp: 90 tablet, Rfl: 0    tamsulosin (FLOMAX) 0.4 mg Cap, Take 1 capsule (0.4 mg  total) by mouth 2 (two) times a day., Disp: 180 capsule, Rfl: 3    clopidogreL (PLAVIX) 75 mg tablet, Take 1 tablet (75 mg total) by mouth once daily. (Patient not taking: Reported on 10/23/2024), Disp: 90 tablet, Rfl: 0    meloxicam (MOBIC) 15 MG tablet, Take 1 tablet (15 mg total) by mouth once daily. (Patient not taking: Reported on 10/23/2024), Disp: 90 tablet, Rfl: 3    spironolactone (ALDACTONE) 25 MG tablet, Take 0.5 tablets (12.5 mg total) by mouth once daily., Disp: 45 tablet, Rfl: 1  No current facility-administered medications for this visit.    Facility-Administered Medications Ordered in Other Visits:     lactated ringers infusion, , Intravenous, Continuous, Ricky Arriola MD, Last Rate: 75 mL/hr at 02/19/24 1111, New Bag at 02/19/24 1111    sodium chloride 0.9% flush 10 mL, 10 mL, Intravenous, PRN, Ricky Arriola MD    Review of Systems   Constitutional: Negative for chills, diaphoresis, fever, malaise/fatigue (AT TIMES), night sweats and weight loss.   HENT:  Negative for congestion and nosebleeds.    Eyes:  Negative for blurred vision and redness.   Cardiovascular:  Negative for chest pain, claudication, cyanosis, dyspnea on exertion (MILD), irregular heartbeat, leg swelling (OCC), near-syncope, orthopnea, palpitations, paroxysmal nocturnal dyspnea and syncope.   Respiratory:  Negative for cough (OCC), hemoptysis, shortness of breath and wheezing.    Hematologic/Lymphatic: Negative for adenopathy. Does not bruise/bleed easily.        LEUKEMIA, PROSTATE CANCER, STABLE   Skin:  Negative for color change and itching.   Musculoskeletal:  Positive for back pain (MILD,CHRONIC, HYDROCODONE) and joint pain. Negative for falls. Muscle weakness: R FOOT.  Gastrointestinal:  Negative for abdominal pain, change in bowel habit, dysphagia, jaundice, melena and nausea.   Genitourinary:  Positive for nocturia. Negative for dysuria and flank pain.   Neurological:  Negative for brief paralysis, focal  "weakness, loss of balance and weakness. Numbness: L FOOT.  Psychiatric/Behavioral:  Negative for altered mental status and depression.    Allergic/Immunologic: Negative for persistent infections.        Objective:      Vitals:    10/23/24 1028   BP: 119/68   Pulse: 72   Weight: 90.9 kg (200 lb 6.4 oz)   Height: 5' 10" (1.778 m)   PainSc: 0-No pain     Body mass index is 28.75 kg/m².    Physical Exam  Constitutional:       Appearance: Normal appearance.   HENT:      Head: Normocephalic and atraumatic.   Eyes:      General: No scleral icterus.     Extraocular Movements: Extraocular movements intact.      Conjunctiva/sclera: Conjunctivae normal.   Neck:      Vascular: Normal carotid pulses. No JVD.   Cardiovascular:      Rate and Rhythm: Normal rate and regular rhythm. No extrasystoles are present.     Pulses: Normal pulses.           Carotid pulses are 2+ on the right side and 2+ on the left side.       Radial pulses are 2+ on the right side and 2+ on the left side.        Posterior tibial pulses are 2+ on the right side and 2+ on the left side.      Heart sounds: Murmur heard.      Systolic murmur is present with a grade of 2/6 at the upper right sternal border.      No friction rub. No gallop.   Pulmonary:      Effort: Pulmonary effort is normal. No respiratory distress.      Breath sounds: Normal breath sounds. No rales.   Abdominal:      Palpations: Abdomen is soft.      Tenderness: There is no abdominal tenderness.   Musculoskeletal:      Cervical back: Neck supple.      Right lower leg: No edema (TRACE).      Left lower leg: No edema (TRACE).   Skin:     General: Skin is warm and dry.      Capillary Refill: Capillary refill takes less than 2 seconds.   Neurological:      General: No focal deficit present.      Mental Status: He is alert and oriented to person, place, and time.      Cranial Nerves: Cranial nerve deficit (HEARING AIDS) present.   Psychiatric:         Mood and Affect: Mood normal.         Speech: " Speech normal.         Behavior: Behavior normal.                 ..    Chemistry        Component Value Date/Time     09/17/2024 0448    K 4.2 09/17/2024 0448     09/17/2024 0448    CO2 25 09/17/2024 0448    BUN 22 (H) 09/17/2024 0448    CREATININE 0.92 09/17/2024 0448     (H) 09/17/2024 0448        Component Value Date/Time    CALCIUM 9.6 09/17/2024 0448    ALKPHOS 61 09/17/2024 0448    AST 44 09/17/2024 0448    ALT 49 09/17/2024 0448    BILITOT 0.7 09/17/2024 0448    ESTGFRAFRICA >60 12/07/2020 0905    EGFRNONAA >60 12/07/2020 0905            ..  Lab Results   Component Value Date    CHOL 116 (L) 09/14/2024    CHOL 117 (L) 03/02/2023    CHOL 154 12/07/2020     Lab Results   Component Value Date    HDL 47 09/14/2024    HDL 56 03/02/2023    HDL 51 12/07/2020     Lab Results   Component Value Date    LDLCALC 46.6 (L) 09/14/2024    LDLCALC 47.4 (L) 03/02/2023    LDLCALC 88.8 12/07/2020     Lab Results   Component Value Date    TRIG 112 09/14/2024    TRIG 68 03/02/2023    TRIG 71 12/07/2020     Lab Results   Component Value Date    CHOLHDL 40.5 09/14/2024    CHOLHDL 47.9 03/02/2023    CHOLHDL 33.1 12/07/2020     ..  Lab Results   Component Value Date    WBC 6.97 09/17/2024    HGB 12.4 (L) 09/17/2024    HCT 36.7 (L) 09/17/2024    MCV 98 09/17/2024     (L) 09/17/2024       Test(s) Reviewed  I have reviewed the following in detail:  [x] Stress test   [] Angiography   [x] Echocardiogram   [x] Labs   [x] Other:       Assessment:         ICD-10-CM ICD-9-CM   1. Moderate aortic stenosis  I35.0 424.1   2. Labile hypertension  R09.89 401.9   3. Coronary artery disease involving native coronary artery of native heart without angina pectoris  I25.10 414.01   4. Hypercholesterolemia  E78.00 272.0     Problem List Items Addressed This Visit          Cardiac/Vascular    Moderate aortic stenosis - Primary    Relevant Orders    BNP    Hypercholesterolemia    Coronary artery disease    Relevant Orders     Basic Metabolic Panel    Magnesium    BNP    Labile hypertension    Relevant Orders    Basic Metabolic Panel    Magnesium        Plan:     ADD SPIRONOLACTONE LOW DOSE, FOR SOME FLUID RETENTION DID RESPOND TO DIURETICS DURING HOSPITALIZATION, STABLE ANGINA NO OVERT HEART FAILURE CONTINUE CURRENT TREATMENT TO START RADIATION FOR PROSTATE CANCER NOT SURE IF IT IS GOING TO BE HERE OR AT [facility], WALKING EXERCISE PROGRAM RETURN TO CLINIC IN FEW MONTHS WITH LABS DISCUSSED PLAN WITH THE PATIENT AND HIS WIFE, AORTIC STENOSIS NOT SEVERE ENOUGH TO WARRANT INTERVENTION AT THIS POINT      Moderate aortic stenosis  -     BNP; Future; Expected date: 01/23/2025    Labile hypertension  -     Basic Metabolic Panel; Future; Expected date: 10/23/2024  -     Magnesium; Future; Expected date: 10/23/2024    Coronary artery disease involving native coronary artery of native heart without angina pectoris  -     Basic Metabolic Panel; Future; Expected date: 10/23/2024  -     Magnesium; Future; Expected date: 10/23/2024  -     BNP; Future; Expected date: 01/23/2025    Hypercholesterolemia    Other orders  -     spironolactone (ALDACTONE) 25 MG tablet; Take 0.5 tablets (12.5 mg total) by mouth once daily.  Dispense: 45 tablet; Refill: 1    RTC Low level/low impact aerobic exercise 5x's/wk. Heart healthy diet and risk factor modification.    See labs and med orders.    Aerobic exercise 5x's/wk. Heart healthy diet and risk factor modification.    See labs and med orders.      ALL CV CLINICALLY STABLE, NO ANGINA, NO HF, NO TIA, NO CLINICAL ARRHYTHMIA,CONTINUE CURRENT MEDS, EDUCATION, DIET, EXERCISE

## 2024-11-04 ENCOUNTER — OFFICE VISIT (OUTPATIENT)
Dept: RADIATION ONCOLOGY | Facility: CLINIC | Age: 79
End: 2024-11-04
Payer: MEDICARE

## 2024-11-04 VITALS
RESPIRATION RATE: 20 BRPM | BODY MASS INDEX: 28.75 KG/M2 | DIASTOLIC BLOOD PRESSURE: 69 MMHG | SYSTOLIC BLOOD PRESSURE: 117 MMHG | TEMPERATURE: 98 F | OXYGEN SATURATION: 95 % | HEART RATE: 70 BPM | WEIGHT: 200.38 LBS

## 2024-11-04 DIAGNOSIS — C61 PROSTATE CANCER: ICD-10-CM

## 2024-11-04 PROCEDURE — 99214 OFFICE O/P EST MOD 30 MIN: CPT | Mod: PBBFAC,PN | Performed by: RADIOLOGY

## 2024-11-04 PROCEDURE — 99999 PR PBB SHADOW E&M-EST. PATIENT-LVL IV: CPT | Mod: PBBFAC,,, | Performed by: RADIOLOGY

## 2024-11-04 PROCEDURE — 99205 OFFICE O/P NEW HI 60 MIN: CPT | Mod: S$PBB,,, | Performed by: RADIOLOGY

## 2024-11-04 RX ORDER — VENLAFAXINE HYDROCHLORIDE 37.5 MG/1
37.5 CAPSULE, EXTENDED RELEASE ORAL DAILY
Qty: 30 CAPSULE | Refills: 11 | Status: SHIPPED | OUTPATIENT
Start: 2024-11-04 | End: 2025-11-04

## 2024-11-04 NOTE — PROGRESS NOTES
11/04/2024    [facility]  [facility]   Radiation Oncology Consultation    Assessment   This is a 79 y.o. y/o male with Grade Group 2, PSA 27 (corrected for oxybutinin), high (by PSA) risk adenocarcinoma of the prostate.  He presents today to discuss possible treatment with radiation therapy.        Plan     Treatment options were discussed with the patient including radical prostatectomy, radiation therapy and androgen deprivation.  We discussed the goals of treatment to be curative.  The risks, benefits, scheduling, alternatives to and rationale of radiation therapy were explained in detail.    PSA discordant with biopsy results  Will reach out to MD Roman (Dr. Gabrielle New) re: Decipher testing. If Decipher has not been performed, will request MD Roman send out for it.   Indication, course, and potential toxicities of pelvic radiation therapy reviewed in detail, including but not limited to fatigue, increased frequency, urgency, or pain with urination, increased frequency or urgency of bowel movements, diarrhea, pelvic bone fragility, erectile dysfunction, decreased volume of ejaculate, remote risk of secondary malignancy.   After this discussion, he elected to proceed with Decipher testing for risk stratification.    Rx Venlafaxine for hot flashes (start 37.5., can increase to 75mg qDay)  He was given our contact information, and he was told that he could call our clinic at any time if he has any questions or concerns.      Radiation Treatment Details:   Pending review of Decipher report  IMRT is medically necessary to treat complex dose painted target volumes in the prostate region with concave and convex isodose lines with steep isodose gradients to spare multiple adjacent organs at risk including the rectum, bladder, penile bulb   We will utilize daily CT or orthogonal image guidance due to the need for accurate daily patient alignment to treat the target volumes accurately and  avoid radiation overdose to multiple regional organs at risk since we are treating the patient with complex target volumes with multiple steep isodose gradients.          Chief Complaint   Patient presents with    Prostate Cancer         Oncology History    No history exists.       HPI: The patient is an 79 year old male with a diagnosis of prostate cancer.      He has a history of NCCN low risk prostate cancer on active surveillance which more recently progressed.  Initially diagnosed here then managed at [facility].  He was originally diagnosed with Grade Group 1 disease in 2018. He had confirmatory biopsy in 2019 that also showed Grade Group 1 disease. He had been on watchful waiting approach but his PSA, when corrected for finasteride luis to 24 recently prompting MRI and fusion biopsy on 3/21/2024.      Pathology showed Grade Group 2 disease in 2 of 12 systematic cores as well as Grade Group 2 disease in the region of interest at the left anterior mid gland. The region of interest had up to 11 mm of cancer and suggestion of extraprostatic extension.  NCCN is high risk based on PSA. CT CAP and bone scan from 4/2024 showed no evidence of metastatic disease. MRI showed a median lobe with intravesical component.     He was recommended to undergo ADT and EBRT.  MRI showed a median lobe and was recommended to undergo TURP prior to radiation.     He has had Lupron x2 - q3 months. Most recent injection was 8/16.  He had significant fatigue from the ADT.  He was given a prescription for Relugolix, but it was cost prohibitive.     He underwent TURP 8/19.  Prostate volume was 52 cc.  His LUTS have improved significantly. Nocturia 1-2x. The stream is much stronger.      Most recent PSA was 0.35 from 7/24     He has a family history of prostate cancer, a KPS of 100, and his comorbidities are CLL, CAD s/p stent 2022, HTN, HLD, CKD.        PSA Diagnostic   Latest Ref Rng 0.00 - 4.00 ng/mL   6/6/2018 6.8 (H)    12/7/2020 7.8  (H)    2/1/2024 13.9 (H)    7/10/2024 0.35            He presents today to discuss the potential role of radiation therapy in his cancer care.    IPSS is 9 (post-TURP)    He reports notable erectile difficulty at baseline (on Lupron).     History of prior irradiation: No  History of prior systemic anti-cancer therapy: Lupron  History of collagen vascular disease: No  Implanted electronic device (pacer/defib/nerve stimulator): No    Past Medical History:   Diagnosis Date    Anticoagulant long-term use     aspirin and plavix    Cancer     skin cancer to face    Cholelithiasis     Constipation, chronic     severe    Coronary artery disease     mitral valve regurgitation, aortic stenosis    Degenerative disc disease, cervical     Gallstones     GERD (gastroesophageal reflux disease)     Hiatal hernia     Hypothyroidism     Inguinal hernia without mention of obstruction or gangrene, unilateral or unspecified, (not specified as recurrent)     Insomnia     Irregular heart beat     currently not an issue and not on any meds for it    Osteoarthritis     Renal disorder     CKD Stage 3a    Sleep apnea     does not use CPAP; wife denies       Past Surgical History:   Procedure Laterality Date    ANGIOGRAM, CORONARY, WITH LEFT HEART CATHETERIZATION N/A 10/18/2022    Procedure: Angiogram, Coronary, with Left Heart Cath;  Surgeon: Joaquin Freedman MD;  Location: [facility] CATH;  Service: Cardiology;  Laterality: N/A;    CHOLECYSTECTOMY      COLONOSCOPY  2013    Dr. Gee    COLONOSCOPY N/A 06/06/2016    Procedure: COLONOSCOPY;  Surgeon: Ricky Arriola MD;  Location: [facility] ENDO;  Service: Endoscopy;  Laterality: N/A;    COLONOSCOPY  07/15/2021    Dr. Hawk    COLONOSCOPY N/A 4/24/2024    Procedure: COLONOSCOPY;  Surgeon: Ricky Arriola MD;  Location: [facility] ENDO;  Service: Gastroenterology;  Laterality: N/A;    CYSTOSCOPY N/A 06/18/2018    Procedure: CYSTOSCOPY;  Surgeon: Andry Paz MD;  Location: [facility] OR;  Service:  Urology;  Laterality: N/A;    CYSTOURETHROSCOPY  10/2019    EGD, WITH BALLOON DILATION N/A 09/21/2021    ELBOW ARTHROPLASTY      ESOPHAGEAL DILATION N/A 02/19/2024    Procedure: DILATION, ESOPHAGUS;  Surgeon: Ricky Arriola MD;  Location: [facility];  Service: Gastroenterology;  Laterality: N/A;    ESOPHAGOGASTRODUODENOSCOPY N/A 02/19/2024    Procedure: EGD (ESOPHAGOGASTRODUODENOSCOPY);  Surgeon: Ricky Arriola MD;  Location: [facility];  Service: Gastroenterology;  Laterality: N/A;    EYE SURGERY Bilateral     cataract    FRACTURE SURGERY Right     knee     FUNCTIONAL ENDOSCOPIC SINUS SURGERY (FESS) USING COMPUTER-ASSISTED NAVIGATION Bilateral 06/09/2023    Procedure: SINUS SURGERY FUNCTIONAL ENDOSCOPIC WITH NAVIGATION;  Surgeon: Dick Hernandez MD;  Location: [facility];  Service: ENT;  Laterality: Bilateral;    HIATAL HERNIA REPAIR  2013    [facility]    KNEE ARTHROSCOPY Right     LEFT HEART CATHETERIZATION Right 9/16/2024    Procedure: Left heart cath 3621;  Surgeon: Aj Montano MD;  Location: [facility] CATH;  Service: Cardiology;  Laterality: Right;    NASAL SEPTOPLASTY Bilateral 06/09/2023    Procedure: SEPTOPLASTY;  Surgeon: Dick Hernandez MD;  Location: [facility] OR;  Service: ENT;  Laterality: Bilateral;    neck injection  10/2019    NECK SURGERY      prostate biospy      normal per pt    SHOULDER SURGERY Bilateral     SINUS SURGERY  01/2019    Mirna Gill    TONSILLECTOMY      TRANSRECTAL BIOPSY OF PROSTATE WITH ULTRASOUND GUIDANCE N/A 06/18/2018    Procedure: BIOPSY, PROSTATE, TRANSRECTAL APPROACH, WITH US GUIDANCE;  Surgeon: Andry Paz MD;  Location: [facility] OR;  Service: Urology;  Laterality: N/A;    UPPER GASTROINTESTINAL ENDOSCOPY  ~2013       Social History     Tobacco Use    Smoking status: Never    Smokeless tobacco: Never   Substance Use Topics    Alcohol use: Yes     Comment: rarely    Drug use: No       Cancer-related family history includes Lung cancer in his father and mother;  Prostate cancer in his maternal uncle and paternal uncle; Skin cancer in his father. There is no history of Esophageal cancer or Liver cancer.    Current Outpatient Medications on File Prior to Visit   Medication Sig Dispense Refill    albuterol (PROVENTIL/VENTOLIN HFA) 90 mcg/actuation inhaler Inhale 2 puffs into the lungs every 6 (six) hours as needed for Shortness of Breath or Wheezing.      aluminum hydrox-magnesium carb 254-237.5 mg/5 mL Susp Take 10 mLs by mouth daily as needed (stomach upset).       aspirin 81 MG Chew Take 81 mg by mouth every other day.      carvediloL (COREG) 3.125 MG tablet Take 1 tablet (3.125 mg total) by mouth 2 (two) times daily with meals. 180 tablet 3    clopidogreL (PLAVIX) 75 mg tablet Take 1 tablet (75 mg total) by mouth once daily. (Patient not taking: Reported on 10/23/2024) 90 tablet 0    fluorouraciL (EFUDEX) 5 % cream Apply topically 2 (two) times daily. For 2 weeks. 40 g 1    fluticasone propionate (FLONASE) 50 mcg/actuation nasal spray 1 spray by Each Nostril route 2 (two) times daily.      HYDROcodone-acetaminophen (NORCO)  mg per tablet Take 1 tablet by mouth every 8 (eight) hours as needed for Pain.      L gasseri/B bifidum/B longum (PROBIOTIC COLON CARE ORAL) Take 1 tablet by mouth once daily.      leuprolide (LUPRON) 22.5 mg injection Inject 22.5 mg into the muscle every 3 (three) months.      levothyroxine (SYNTHROID) 88 MCG tablet Take 1 tablet (88 mcg total) by mouth once daily. 90 tablet 3    meloxicam (MOBIC) 15 MG tablet Take 1 tablet (15 mg total) by mouth once daily. (Patient not taking: Reported on 10/23/2024) 90 tablet 3    oxybutynin (DITROPAN-XL) 10 MG 24 hr tablet Take 1 tablet (10 mg total) by mouth once daily. 90 tablet 3    pantoprazole (PROTONIX) 40 MG tablet Take 1 tablet (40 mg total) by mouth once daily. 30 tablet 2    rosuvastatin (CRESTOR) 10 MG tablet Take 1 tablet (10 mg total) by mouth every evening. 90 tablet 0    spironolactone  (ALDACTONE) 25 MG tablet Take 0.5 tablets (12.5 mg total) by mouth once daily. 45 tablet 1    tamsulosin (FLOMAX) 0.4 mg Cap Take 1 capsule (0.4 mg total) by mouth 2 (two) times a day. 180 capsule 3     Current Facility-Administered Medications on File Prior to Visit   Medication Dose Route Frequency Provider Last Rate Last Admin    lactated ringers infusion   Intravenous Continuous Ricky Arriola MD 75 mL/hr at 02/19/24 1111 New Bag at 02/19/24 1111    sodium chloride 0.9% flush 10 mL  10 mL Intravenous PRN Ricky Arriola MD           Review of patient's allergies indicates:   Allergen Reactions    Myrbetriq [mirabegron] Swelling     Chest pain,nausea, abdominal swelling, decrease erection       Review of Systems   Constitutional:  Positive for diaphoresis (hot flashes) and malaise/fatigue.   Psychiatric/Behavioral:  The patient has insomnia (2/2 hot flashes).         Vital Signs: /69   Pulse 70   Temp 98.2 °F (36.8 °C)   Resp 20   Wt 90.9 kg (200 lb 6.4 oz)   SpO2 95%   BMI 28.75 kg/m²     ECOG Performance Status: 1 - Ambulates, capable of light work    Physical Exam  Vitals and nursing note reviewed.   Constitutional:       General: He is not in acute distress.     Appearance: Normal appearance. He is not ill-appearing or toxic-appearing.   HENT:      Head: Normocephalic and atraumatic.      Nose: Nose normal.   Eyes:      Extraocular Movements: Extraocular movements intact.      Conjunctiva/sclera: Conjunctivae normal.      Pupils: Pupils are equal, round, and reactive to light.   Pulmonary:      Effort: Pulmonary effort is normal.   Musculoskeletal:         General: Normal range of motion.      Cervical back: Normal range of motion and neck supple.   Skin:     General: Skin is warm.   Neurological:      General: No focal deficit present.      Mental Status: He is alert and oriented to person, place, and time.   Psychiatric:         Mood and Affect: Mood normal.         Behavior: Behavior  normal.         Thought Content: Thought content normal.         Judgment: Judgment normal.           Imaging: I have personally reviewed the patient's available images and reports and summarized pertinent findings above in HPI.     Pathology: I have personally reviewed the patient's available pathology and summarized pertinent findings above in HPI.     This case was discussed with Dr. Barrios      - Thank you for allowing me to participate in the care of your patient.    Maddison Ibarra MD    Total time today for this encounter was 75 minutes. This includes preparing to see the patient (eg, review of tests), obtaining and/or reviewing separately obtained history, documenting clinical information in the electronic or other health record, independently interpreting results and communicating results to the patient/family/caregiver, and discussing the plan with other providers when necessary.

## 2024-12-02 ENCOUNTER — PATIENT MESSAGE (OUTPATIENT)
Dept: RADIATION ONCOLOGY | Facility: CLINIC | Age: 79
End: 2024-12-02
Payer: MEDICARE

## 2024-12-03 ENCOUNTER — DOCUMENTATION ONLY (OUTPATIENT)
Dept: AUDIOLOGY | Facility: CLINIC | Age: 79
End: 2024-12-03
Payer: MEDICARE

## 2024-12-03 NOTE — PROGRESS NOTES
The patient requested more [employer] wax filters and domes for his hearing aids. I put some at the check in desk for the patient.

## 2025-01-15 ENCOUNTER — PATIENT MESSAGE (OUTPATIENT)
Dept: RADIATION ONCOLOGY | Facility: CLINIC | Age: 80
End: 2025-01-15
Payer: MEDICARE

## 2025-01-16 ENCOUNTER — LAB VISIT (OUTPATIENT)
Dept: PRIMARY CARE CLINIC | Facility: CLINIC | Age: 80
End: 2025-01-16
Payer: MEDICARE

## 2025-01-16 DIAGNOSIS — R09.89 LABILE HYPERTENSION: ICD-10-CM

## 2025-01-16 DIAGNOSIS — I25.10 CORONARY ARTERY DISEASE INVOLVING NATIVE CORONARY ARTERY OF NATIVE HEART WITHOUT ANGINA PECTORIS: Chronic | ICD-10-CM

## 2025-01-16 DIAGNOSIS — I35.0 MODERATE AORTIC STENOSIS: Chronic | ICD-10-CM

## 2025-01-16 LAB
ANION GAP SERPL CALC-SCNC: 8 MMOL/L (ref 8–16)
BNP SERPL-MCNC: 44 PG/ML (ref 0–99)
BUN SERPL-MCNC: 18 MG/DL (ref 8–23)
CALCIUM SERPL-MCNC: 9.9 MG/DL (ref 8.7–10.5)
CHLORIDE SERPL-SCNC: 105 MMOL/L (ref 95–110)
CO2 SERPL-SCNC: 26 MMOL/L (ref 23–29)
CREAT SERPL-MCNC: 1 MG/DL (ref 0.5–1.4)
EST. GFR  (NO RACE VARIABLE): >60 ML/MIN/1.73 M^2
GLUCOSE SERPL-MCNC: 149 MG/DL (ref 70–110)
MAGNESIUM SERPL-MCNC: 2.2 MG/DL (ref 1.6–2.6)
POTASSIUM SERPL-SCNC: 4.4 MMOL/L (ref 3.5–5.1)
SODIUM SERPL-SCNC: 139 MMOL/L (ref 136–145)

## 2025-01-16 PROCEDURE — 83735 ASSAY OF MAGNESIUM: CPT | Performed by: INTERNAL MEDICINE

## 2025-01-16 PROCEDURE — 83880 ASSAY OF NATRIURETIC PEPTIDE: CPT | Performed by: INTERNAL MEDICINE

## 2025-01-16 PROCEDURE — 80048 BASIC METABOLIC PNL TOTAL CA: CPT | Performed by: INTERNAL MEDICINE

## 2025-01-23 ENCOUNTER — TELEPHONE (OUTPATIENT)
Dept: AUDIOLOGY | Facility: CLINIC | Age: 80
End: 2025-01-23
Payer: MEDICARE

## 2025-01-23 NOTE — TELEPHONE ENCOUNTER
I spoke to the patient's wife to get him rescheduled for his annual hearing test and hearing aid adjustment. He is about to schedule radiation visits, so his wife said she will call us back to reschedule at a later date.

## 2025-01-27 ENCOUNTER — HOSPITAL ENCOUNTER (OUTPATIENT)
Dept: RADIATION THERAPY | Facility: HOSPITAL | Age: 80
Discharge: HOME | End: 2025-01-27
Attending: RADIOLOGY
Payer: MEDICARE

## 2025-01-27 ENCOUNTER — LAB VISIT (OUTPATIENT)
Dept: LAB | Facility: HOSPITAL | Age: 80
End: 2025-01-27
Attending: RADIOLOGY
Payer: MEDICARE

## 2025-01-27 ENCOUNTER — OFFICE VISIT (OUTPATIENT)
Dept: RADIATION ONCOLOGY | Facility: CLINIC | Age: 80
End: 2025-01-27
Payer: MEDICARE

## 2025-01-27 VITALS
HEIGHT: 70 IN | DIASTOLIC BLOOD PRESSURE: 76 MMHG | RESPIRATION RATE: 16 BRPM | WEIGHT: 197.31 LBS | SYSTOLIC BLOOD PRESSURE: 132 MMHG | TEMPERATURE: 98 F | BODY MASS INDEX: 28.25 KG/M2 | OXYGEN SATURATION: 96 % | HEART RATE: 70 BPM

## 2025-01-27 DIAGNOSIS — C61 PROSTATE CANCER: Primary | ICD-10-CM

## 2025-01-27 DIAGNOSIS — C61 PROSTATE CANCER: ICD-10-CM

## 2025-01-27 LAB — COMPLEXED PSA SERPL-MCNC: 0.15 NG/ML (ref 0–4)

## 2025-01-27 PROCEDURE — 77334 RADIATION TREATMENT AID(S): CPT | Mod: 26,,, | Performed by: RADIOLOGY

## 2025-01-27 PROCEDURE — 77263 THER RADIOLOGY TX PLNG CPLX: CPT | Mod: ,,, | Performed by: RADIOLOGY

## 2025-01-27 PROCEDURE — 99213 OFFICE O/P EST LOW 20 MIN: CPT | Mod: S$PBB,25,, | Performed by: RADIOLOGY

## 2025-01-27 PROCEDURE — 77014 HC CT GUIDANCE RADIATION THERAPY FLDS PLACEMENT: CPT | Mod: TC,PN | Performed by: RADIOLOGY

## 2025-01-27 PROCEDURE — 84153 ASSAY OF PSA TOTAL: CPT | Performed by: RADIOLOGY

## 2025-01-27 PROCEDURE — 99999 PR PBB SHADOW E&M-EST. PATIENT-LVL IV: CPT | Mod: PBBFAC,,, | Performed by: RADIOLOGY

## 2025-01-27 PROCEDURE — 36415 COLL VENOUS BLD VENIPUNCTURE: CPT | Mod: PN | Performed by: RADIOLOGY

## 2025-01-27 PROCEDURE — 77014 PR  CT GUIDANCE PLACEMENT RAD THERAPY FIELDS: CPT | Mod: 26,,, | Performed by: RADIOLOGY

## 2025-01-27 PROCEDURE — 77334 RADIATION TREATMENT AID(S): CPT | Mod: TC,PN | Performed by: RADIOLOGY

## 2025-01-27 PROCEDURE — 99214 OFFICE O/P EST MOD 30 MIN: CPT | Mod: PBBFAC,PN,25 | Performed by: RADIOLOGY

## 2025-01-27 NOTE — PROGRESS NOTES
[facility] Department of Radiation Oncology  Follow Up Visit Note    Diagnosis:  Eligio Richardson is a 80 y.o. male with a(n) Grade Group 2, PSA 27 (corrected for oxybutinin), high (by PSA) risk adenocarcinoma of the prostate .  He presents today to discuss the role of radiation therapy in his care.      Oncologic History:  Oncology History    No history exists.        Interval History  The patient presents today to review Decipher Score results.  He was last seen in our clinic on 11/4/24 .   Since that time,  decision was made to pursue Decipher testing for risk stratification, given discordance between grade and volume of disease and PSA.      [facility], and Decipher testing would require another biopsy.  The patient wishes to proceed w/active radiation therapy at this time. Pt does not wish to continue ADT due to hot flashes and pronounced fatigue    History or Present Illness:  The patient is an 79 year old male with a diagnosis of prostate cancer.       He has a history of NCCN low risk prostate cancer on active surveillance which more recently progressed.  Initially diagnosed here then managed at [facility].  He was originally diagnosed with Grade Group 1 disease in 2018. He had confirmatory biopsy in 2019 that also showed Grade Group 1 disease. He had been on watchful waiting approach but his PSA, when corrected for finasteride luis to 24 recently prompting MRI and fusion biopsy on 3/21/2024.       Pathology showed Grade Group 2 disease in 2 of 12 systematic cores as well as Grade Group 2 disease in the region of interest at the left anterior mid gland. The region of interest had up to 11 mm of cancer and suggestion of extraprostatic extension.  NCCN is high risk based on PSA. CT CAP and bone scan from 4/2024 showed no evidence of metastatic disease.     4/11/24 (outside- images not available for review) MRI:  Severe BPH  showed a median lobe with intravesical component  Lesion 1: 2.7cm midline TZ at level of mid gland; + anterior EPE     He was recommended to undergo ADT and EBRT.  MRI showed a median lobe and was recommended to undergo TURP prior to radiation.      He has had Lupron x2 - q3 months. Most recent injection was 8/16.  He had significant fatigue from the ADT.  He was given a prescription for Relugolix, but it was cost prohibitive.     He underwent TURP 8/19.  Prostate volume was 52 cc.  His LUTS have improved significantly. Nocturia 1-2x. The stream is much stronger. No tumor seen on path specimen.     Most recent PSA was 0.35 from 7/24     He has a family history of prostate cancer, a KPS of 100, and his comorbidities are CLL, CAD s/p stent 2022, HTN, HLD, CKD.        PSA Diagnostic   Latest Ref Rng 0.00 - 4.00 ng/mL   6/6/2018 6.8 (H)    12/7/2020 7.8 (H)    2/1/2024 13.9 (H)    7/10/2024 0.35         He presents today to discuss the potential role of radiation therapy in his cancer care.    IPSS is 9 (post-TURP)    He reports notable erectile difficulty at baseline (on Lupron).      History of prior irradiation: No  History of prior systemic anti-cancer therapy: Lupron  History of collagen vascular disease: No  Implanted electronic device (pacer/defib/nerve stimulator): No    Review of Systems   Review of Systems   Constitutional:  Positive for diaphoresis (effexor improving hot flashes) and malaise/fatigue (improving fatigue). Negative for chills and fever.   Respiratory:  Negative for cough and shortness of breath.    Cardiovascular:  Negative for chest pain.   Gastrointestinal:  Positive for constipation (on herbal medication). Negative for blood in stool, nausea and vomiting.   Genitourinary:  Positive for frequency and urgency. Negative for dysuria and hematuria.         Exam:  Vitals:    01/27/25 1354   BP: 132/76   Pulse: 70   Resp: 16   Temp: 98.3 °F (36.8 °C)   SpO2: 96%   Weight: 89.5 kg (197 lb 5 oz)  "  Height: 5' 10" (1.778 m)     Constitutional: Pleasant 80 y.o. male in no acute distress.  Well nourished. Well groomed.   HEENT: Normocephalic and atraumatic   Lungs: No audible wheezing.  Normal effort.   Musculoskeletal: No gross MSK deformities. Ambulates  Skin: No rashes appreciated.   Psych: Alert and oriented with appropriate mood and affect.  Neuro:   Grossly normal.      Assessment:  80 y.o. male with a(n) Grade Group 2, PSA 27 (corrected for oxybutinin), high (by PSA) risk adenocarcinoma of the prostate   ECOG: (1) Restricted in physically strenuous activity, ambulatory and able to do work of light nature    Plan:  Schedule for CT simulation for radiation therapy planning on today  Indication, course, and potential toxicities of pelvic radiation therapy reviewed in detail, including but not limited to fatigue, increased frequency, urgency, or pain with urination, increased frequency or urgency of bowel movements, diarrhea, pelvic bone fragility, erectile dysfunction, decreased volume of ejaculate, remote risk of secondary malignancy.   Patient and his wife understand that the standard of care for high-risk prostate cancer is long-term ADT, and that forgoing further ADT may compromise outcomes. Patient noted significant impact on QOL with lupron due to secondary effects and does not wish to resume.  Informed consent obtained  Update PSA (last ~ 6 months ago)  Plan to start radiation therapy in 1-2 weeks.  Plan to treat the prostate and Svs to 78Gy in 39 fractions IMRT  Follow up with Urology as directed  He was given our contact information, and he was told that he could call our clinic at anytime if he has any questions or concerns.  Follow up with other providers as directed           "

## 2025-01-29 ENCOUNTER — PATIENT MESSAGE (OUTPATIENT)
Dept: HEMATOLOGY/ONCOLOGY | Facility: CLINIC | Age: 80
End: 2025-01-29
Payer: MEDICARE

## 2025-01-29 ENCOUNTER — DOCUMENTATION ONLY (OUTPATIENT)
Dept: HEMATOLOGY/ONCOLOGY | Facility: CLINIC | Age: 80
End: 2025-01-29
Payer: MEDICARE

## 2025-01-29 NOTE — NURSING
Chart reviewed. Mr. Richardson has a hx prostate cancer and is currently established with Dr. Ibarra. He is scheduled to begin radiation treatment on 2/5/25.     Of note, he also has a hx CLL.     Added self to care team. [employer] tool updated. My contact information sent via [employer].     Will continue to follow for [facility] navigation needs.     Oncology Navigation   Intake  Date of Diagnosis: 06/27/18  Cancer Type: ; Leukemia (prostate, CLL)  Type of Referral: Internal     Treatment  Current Status: Active    Surgery: Completed  Surgical Oncologist: Dr. Shankar Mccrary ([facility])  Type of Surgery: COMPLETE ELECTROSURGICAL RESECTION OF PROSTATE BY TRANSURETHRAL APPROACH (TURP)  Surgery Schedule Date: 08/19/24    Medical Oncologist: Dr. Jonathan Serna ([facility])    Radiation Therapy: Planned  Radiation Oncologist: Dr. Maddison Ibarra  Start Date: 02/05/25    Procedures: MRI; Bone scan; CT  Bone Scan Schedule Date: 04/11/24  CT Schedule Date: 06/27/24  MRI Schedule Date: 04/11/24    General Referrals: [employer]    Radiation Oncologist: Dr. Maddison Ibarra    Barriers of Care: Comorbidities  Comorbidities: hx prostate cancer, CLL        Follow Up  Follow up for oncology navigation needs.

## 2025-02-03 ENCOUNTER — HOSPITAL ENCOUNTER (OUTPATIENT)
Dept: RADIATION THERAPY | Facility: HOSPITAL | Age: 80
Discharge: HOME | End: 2025-02-03
Attending: RADIOLOGY
Payer: MEDICARE

## 2025-02-03 PROCEDURE — 77301 RADIOTHERAPY DOSE PLAN IMRT: CPT | Mod: 26,,, | Performed by: RADIOLOGY

## 2025-02-03 PROCEDURE — 77301 RADIOTHERAPY DOSE PLAN IMRT: CPT | Mod: TC,PN | Performed by: RADIOLOGY

## 2025-02-04 PROCEDURE — 77300 RADIATION THERAPY DOSE PLAN: CPT | Mod: 26,,, | Performed by: RADIOLOGY

## 2025-02-04 PROCEDURE — 77300 RADIATION THERAPY DOSE PLAN: CPT | Mod: TC,PN | Performed by: RADIOLOGY

## 2025-02-04 PROCEDURE — 77338 DESIGN MLC DEVICE FOR IMRT: CPT | Mod: TC,PN | Performed by: RADIOLOGY

## 2025-02-04 PROCEDURE — 77338 DESIGN MLC DEVICE FOR IMRT: CPT | Mod: 26,,, | Performed by: RADIOLOGY

## 2025-02-05 ENCOUNTER — DOCUMENTATION ONLY (OUTPATIENT)
Dept: RADIATION ONCOLOGY | Facility: CLINIC | Age: 80
End: 2025-02-05
Payer: MEDICARE

## 2025-02-05 PROCEDURE — 77385 HC IMRT, SIMPLE: CPT | Mod: PN | Performed by: RADIOLOGY

## 2025-02-05 PROCEDURE — 77014 PR  CT GUIDANCE PLACEMENT RAD THERAPY FIELDS: CPT | Mod: 26,,, | Performed by: RADIOLOGY

## 2025-02-05 NOTE — PLAN OF CARE
Patient completed 1 of 39 outpatient radiation treatments to the prostate without difficulty.  Education provided and patient verbalizes understanding.  All questions and concerns addressed with XRT team.

## 2025-02-06 PROCEDURE — 77385 HC IMRT, SIMPLE: CPT | Mod: PN | Performed by: RADIOLOGY

## 2025-02-06 PROCEDURE — 77014 PR  CT GUIDANCE PLACEMENT RAD THERAPY FIELDS: CPT | Mod: 26,,, | Performed by: RADIOLOGY

## 2025-02-07 DIAGNOSIS — N40.0 BENIGN NON-NODULAR PROSTATIC HYPERPLASIA WITHOUT LOWER URINARY TRACT SYMPTOMS: ICD-10-CM

## 2025-02-07 PROCEDURE — 77014 PR  CT GUIDANCE PLACEMENT RAD THERAPY FIELDS: CPT | Mod: 26,,, | Performed by: RADIOLOGY

## 2025-02-07 PROCEDURE — 77385 HC IMRT, SIMPLE: CPT | Mod: PN | Performed by: RADIOLOGY

## 2025-02-07 NOTE — TELEPHONE ENCOUNTER
Refill Routing Note   Medication(s) are not appropriate for processing by [facility] for the following reason(s):        Outside of protocol    [facility] action(s):  Route        Medication Therapy Plan: PROSTATE CANCER      Appointments  past 12m or future 3m with PCP    Date Provider   Last Visit   9/24/2024 Acosta Corley MD   Next Visit   Visit date not found Acosta Corley MD   ED visits in past 90 days: 0        Note composed:5:51 PM 02/07/2025

## 2025-02-07 NOTE — TELEPHONE ENCOUNTER
No care due was identified.  [facility] Embedded Care Due Messages. Reference number: [id number].   2/07/2025 2:45:39 PM CST

## 2025-02-10 PROCEDURE — 77014 PR  CT GUIDANCE PLACEMENT RAD THERAPY FIELDS: CPT | Mod: 26,,, | Performed by: RADIOLOGY

## 2025-02-10 PROCEDURE — 77385 HC IMRT, SIMPLE: CPT | Mod: PN | Performed by: RADIOLOGY

## 2025-02-10 RX ORDER — TAMSULOSIN HYDROCHLORIDE 0.4 MG/1
0.4 CAPSULE ORAL 2 TIMES DAILY
Qty: 180 CAPSULE | Refills: 2 | Status: SHIPPED | OUTPATIENT
Start: 2025-02-10

## 2025-02-11 ENCOUNTER — DOCUMENTATION ONLY (OUTPATIENT)
Dept: RADIATION ONCOLOGY | Facility: CLINIC | Age: 80
End: 2025-02-11
Payer: MEDICARE

## 2025-02-11 PROCEDURE — 77014 PR  CT GUIDANCE PLACEMENT RAD THERAPY FIELDS: CPT | Mod: 26,,, | Performed by: RADIOLOGY

## 2025-02-11 PROCEDURE — 77336 RADIATION PHYSICS CONSULT: CPT | Mod: PN | Performed by: RADIOLOGY

## 2025-02-11 PROCEDURE — 77385 HC IMRT, SIMPLE: CPT | Mod: PN | Performed by: RADIOLOGY

## 2025-02-12 ENCOUNTER — TELEPHONE (OUTPATIENT)
Dept: HEMATOLOGY/ONCOLOGY | Facility: CLINIC | Age: 80
End: 2025-02-12
Payer: MEDICARE

## 2025-02-12 PROCEDURE — 77385 HC IMRT, SIMPLE: CPT | Mod: PN | Performed by: RADIOLOGY

## 2025-02-12 PROCEDURE — 77427 RADIATION TX MANAGEMENT X5: CPT | Mod: ,,, | Performed by: RADIOLOGY

## 2025-02-12 PROCEDURE — 77014 PR  CT GUIDANCE PLACEMENT RAD THERAPY FIELDS: CPT | Mod: 26,,, | Performed by: RADIOLOGY

## 2025-02-12 NOTE — TELEPHONE ENCOUNTER
Spk with pts wife about attending the upcoming [facility] program. Wife was driving and we had a bad connection, wife asked that we call back this afternoon when she will be home and have a better connection. I informed pt I will thong lher back. Wife verbalized understanding and thanked me for calling.

## 2025-02-13 PROCEDURE — 77014 PR  CT GUIDANCE PLACEMENT RAD THERAPY FIELDS: CPT | Mod: 26,,, | Performed by: RADIOLOGY

## 2025-02-13 PROCEDURE — 77385 HC IMRT, SIMPLE: CPT | Mod: PN | Performed by: RADIOLOGY

## 2025-02-14 ENCOUNTER — TELEPHONE (OUTPATIENT)
Dept: HEMATOLOGY/ONCOLOGY | Facility: CLINIC | Age: 80
End: 2025-02-14
Payer: MEDICARE

## 2025-02-14 PROCEDURE — 77385 HC IMRT, SIMPLE: CPT | Mod: PN | Performed by: RADIOLOGY

## 2025-02-14 PROCEDURE — 77014 PR  CT GUIDANCE PLACEMENT RAD THERAPY FIELDS: CPT | Mod: 26,,, | Performed by: RADIOLOGY

## 2025-02-14 NOTE — TELEPHONE ENCOUNTER
Messi for pt and his wife in regards to the upcoming [facility].  Left all contact info for them to contact us back to discuss [facility].

## 2025-02-17 PROCEDURE — 77014 PR  CT GUIDANCE PLACEMENT RAD THERAPY FIELDS: CPT | Mod: 26,,, | Performed by: RADIOLOGY

## 2025-02-17 PROCEDURE — 77385 HC IMRT, SIMPLE: CPT | Mod: PN | Performed by: RADIOLOGY

## 2025-02-18 ENCOUNTER — DOCUMENTATION ONLY (OUTPATIENT)
Dept: RADIATION ONCOLOGY | Facility: CLINIC | Age: 80
End: 2025-02-18
Payer: MEDICARE

## 2025-02-18 PROCEDURE — 77014 PR  CT GUIDANCE PLACEMENT RAD THERAPY FIELDS: CPT | Mod: 26,,, | Performed by: RADIOLOGY

## 2025-02-18 PROCEDURE — 77385 HC IMRT, SIMPLE: CPT | Mod: PN | Performed by: RADIOLOGY

## 2025-02-18 PROCEDURE — 77336 RADIATION PHYSICS CONSULT: CPT | Mod: PN | Performed by: RADIOLOGY

## 2025-02-18 NOTE — PLAN OF CARE
Completed 10 of 39 outpatient radiation treatments this visit.  All questions and concerns addressed with MD.

## 2025-02-19 PROCEDURE — 77385 HC IMRT, SIMPLE: CPT | Mod: PN | Performed by: RADIOLOGY

## 2025-02-19 PROCEDURE — 77014 PR  CT GUIDANCE PLACEMENT RAD THERAPY FIELDS: CPT | Mod: 26,,, | Performed by: RADIOLOGY

## 2025-02-19 PROCEDURE — 77427 RADIATION TX MANAGEMENT X5: CPT | Mod: ,,, | Performed by: RADIOLOGY

## 2025-02-20 PROCEDURE — 77014 PR  CT GUIDANCE PLACEMENT RAD THERAPY FIELDS: CPT | Mod: 26,,, | Performed by: RADIOLOGY

## 2025-02-20 PROCEDURE — 77385 HC IMRT, SIMPLE: CPT | Mod: PN | Performed by: RADIOLOGY

## 2025-02-21 PROCEDURE — 77385 HC IMRT, SIMPLE: CPT | Mod: PN | Performed by: RADIOLOGY

## 2025-02-21 PROCEDURE — 77014 PR  CT GUIDANCE PLACEMENT RAD THERAPY FIELDS: CPT | Mod: 26,,, | Performed by: RADIOLOGY

## 2025-02-22 DIAGNOSIS — Z00.00 ENCOUNTER FOR MEDICARE ANNUAL WELLNESS EXAM: ICD-10-CM

## 2025-02-24 PROCEDURE — 77014 PR  CT GUIDANCE PLACEMENT RAD THERAPY FIELDS: CPT | Mod: 26,,, | Performed by: RADIOLOGY

## 2025-02-24 PROCEDURE — 77385 HC IMRT, SIMPLE: CPT | Mod: PN | Performed by: RADIOLOGY

## 2025-02-25 ENCOUNTER — DOCUMENTATION ONLY (OUTPATIENT)
Dept: RADIATION ONCOLOGY | Facility: CLINIC | Age: 80
End: 2025-02-25
Payer: MEDICARE

## 2025-02-25 PROCEDURE — 77014 PR  CT GUIDANCE PLACEMENT RAD THERAPY FIELDS: CPT | Mod: 26,,, | Performed by: RADIOLOGY

## 2025-02-25 PROCEDURE — 77385 HC IMRT, SIMPLE: CPT | Mod: PN | Performed by: RADIOLOGY

## 2025-02-25 NOTE — PLAN OF CARE
Patient completed 15 of 39 outpatient radiation treatments to the prostate without difficulty.  No new problems noted.  All questions and concerns addressed by MD this visit.

## 2025-02-26 PROCEDURE — 77014 PR  CT GUIDANCE PLACEMENT RAD THERAPY FIELDS: CPT | Mod: 26,,, | Performed by: RADIOLOGY

## 2025-02-26 PROCEDURE — 77336 RADIATION PHYSICS CONSULT: CPT | Mod: PN | Performed by: RADIOLOGY

## 2025-02-26 PROCEDURE — 77385 HC IMRT, SIMPLE: CPT | Mod: PN | Performed by: RADIOLOGY

## 2025-02-27 PROCEDURE — 77014 PR  CT GUIDANCE PLACEMENT RAD THERAPY FIELDS: CPT | Mod: 26,,, | Performed by: RADIOLOGY

## 2025-02-27 PROCEDURE — 77385 HC IMRT, SIMPLE: CPT | Mod: PN | Performed by: RADIOLOGY

## 2025-02-28 PROCEDURE — 77385 HC IMRT, SIMPLE: CPT | Mod: PN | Performed by: RADIOLOGY

## 2025-02-28 PROCEDURE — 77014 PR  CT GUIDANCE PLACEMENT RAD THERAPY FIELDS: CPT | Mod: 26,,, | Performed by: RADIOLOGY

## 2025-03-03 ENCOUNTER — HOSPITAL ENCOUNTER (OUTPATIENT)
Dept: RADIATION THERAPY | Facility: HOSPITAL | Age: 80
Discharge: HOME | End: 2025-03-03
Attending: RADIOLOGY
Payer: MEDICARE

## 2025-03-06 ENCOUNTER — DOCUMENTATION ONLY (OUTPATIENT)
Dept: RADIATION ONCOLOGY | Facility: CLINIC | Age: 80
End: 2025-03-06
Payer: MEDICARE

## 2025-03-06 NOTE — PLAN OF CARE
Completed daily outpatient radiation treatment without difficulty this visit.  No new problems noted.  No questions or concerns verbalized.

## 2025-03-11 ENCOUNTER — PATIENT MESSAGE (OUTPATIENT)
Dept: FAMILY MEDICINE | Facility: CLINIC | Age: 80
End: 2025-03-11
Payer: MEDICARE

## 2025-03-11 RX ORDER — PANTOPRAZOLE SODIUM 40 MG/1
40 TABLET, DELAYED RELEASE ORAL DAILY
Qty: 30 TABLET | Refills: 2 | Status: SHIPPED | OUTPATIENT
Start: 2025-03-11 | End: 2026-03-11

## 2025-03-11 NOTE — TELEPHONE ENCOUNTER
No care due was identified.  [facility] Embedded Care Due Messages. Reference number: [id number].   3/11/2025 9:50:18 AM CDT

## 2025-03-17 ENCOUNTER — CLINICAL SUPPORT (OUTPATIENT)
Dept: AUDIOLOGY | Facility: CLINIC | Age: 80
End: 2025-03-17
Payer: MEDICARE

## 2025-03-17 ENCOUNTER — DOCUMENTATION ONLY (OUTPATIENT)
Dept: AUDIOLOGY | Facility: CLINIC | Age: 80
End: 2025-03-17
Payer: MEDICARE

## 2025-03-17 DIAGNOSIS — H93.293 IMPAIRED AUDITORY DISCRIMINATION, BILATERAL: ICD-10-CM

## 2025-03-17 DIAGNOSIS — H93.13 TINNITUS, BILATERAL: ICD-10-CM

## 2025-03-17 DIAGNOSIS — Z97.4 WEARS HEARING AID IN BOTH EARS: Primary | ICD-10-CM

## 2025-03-17 DIAGNOSIS — H90.3 BILATERAL SENSORINEURAL HEARING LOSS: ICD-10-CM

## 2025-03-17 PROCEDURE — 92567 TYMPANOMETRY: CPT | Mod: PBBFAC,PO | Performed by: AUDIOLOGIST-HEARING AID FITTER

## 2025-03-17 PROCEDURE — 92556 SPEECH AUDIOMETRY COMPLETE: CPT | Mod: PBBFAC,PO | Performed by: AUDIOLOGIST-HEARING AID FITTER

## 2025-03-17 PROCEDURE — 99999PBSHW PR PBB SHADOW TECHNICAL ONLY FILED TO HB: Mod: PBBFAC,,,

## 2025-03-17 PROCEDURE — 92552 PURE TONE AUDIOMETRY AIR: CPT | Mod: PBBFAC,PO | Performed by: AUDIOLOGIST-HEARING AID FITTER

## 2025-03-17 NOTE — PROGRESS NOTES
"Eligio Richardson came in on 03/17/2025 for an annual hearing aid follow up. Pt was accompanied by spouse during today's visit. Pt stated he has been turning the volume up every days and the right HA battery only lasts until 3-4pm. He will send the aids in for "high battery drain" while they are under warranty. He will renew the warranty in a month or 2. Cleaned both aids and changed the wax filters and domes. Listening check revealed aids to sound appropriate for his hearing loss. Recalculated thresholds from new hearing test and increased overall gain by 3 UA. Informed pt that a notification will be mailed when it is time for the next annual hearing test and that he should call the audiology clinic directly to schedule the appts to coordinate them correctly. Also informed the patient that if domes or wax filters are needed to please inform the clinic and they will be left at the 2nd floor check in desk to be picked up at the earliest convenience. All complaints were addressed at this visit to the patient's satisfaction. Pt should call the clinic PRN otherwise. Plan of care was discussed in detail with the patient, who agreed with the plan as above.    "

## 2025-03-17 NOTE — PROGRESS NOTES
Eligio Richardson was seen 03/17/2025 for an audiological evaluation. Pertinent complaints today include hearing loss and tinnitus AU. Pt reports his left ear is worse than his right. Pt confirms history of loud noise exposure and denies early onset of genetic family history of hearing loss. Otoscopy revealed no cerumen in both ears. The tympanic membrane was visualized AU prior to proceeding with the hearing testing. Pt wears binaural Phonak ERICA hearing aids.      Results reveal a bilateral moderate sloping to profound sensorineural hearing loss.    Speech Reception Thresholds were 50 dBHL for the right ear and 50 dBHL for the left ear.    Word recognition scores were fair for the right ear and fair for the left ear. Some significant changes were noted with a decrease in WR, AD>AS, when compared to previous hearing testing from 4/17/23.      Audiogram results were reviewed in detail with patient and all questions were answered. Results will be reviewed by the referring provider at the completion of this note. Recommend continued daily use of binaural amplification following adjustment to new thresholds from today's hearing test, repeat hearing testing in one year due to tinnitus and noise exposure and bilateral hearing protection with either muffs or in-ear protection in loud noises. Pt was seen immediately following this encounter for a HA adjustment.

## 2025-03-18 ENCOUNTER — DOCUMENTATION ONLY (OUTPATIENT)
Dept: RADIATION ONCOLOGY | Facility: CLINIC | Age: 80
End: 2025-03-18
Payer: MEDICARE

## 2025-03-18 NOTE — PLAN OF CARE
Completed 29 of 39 outpatient radiation treatments to the prostate without difficulty.  No new problems noted.  All questions and concerns addressed by MD this visit.

## 2025-03-24 ENCOUNTER — DOCUMENTATION ONLY (OUTPATIENT)
Dept: AUDIOLOGY | Facility: CLINIC | Age: 80
End: 2025-03-24
Payer: MEDICARE

## 2025-03-24 DIAGNOSIS — C61 PROSTATE CANCER: Primary | ICD-10-CM

## 2025-03-24 NOTE — PROGRESS NOTES
I received in the patient's repaired Phonak hearing aids. The aids were sent in for high battery drain. There is no charge since the patient is in warranty. I contacted the patient to let him know he can  the aids from the second floor check in desk.    Josef P90-R  SN:[id number] and [id number]  Warranty expires on: 07/10/2025

## 2025-03-25 ENCOUNTER — DOCUMENTATION ONLY (OUTPATIENT)
Dept: RADIATION ONCOLOGY | Facility: CLINIC | Age: 80
End: 2025-03-25
Payer: MEDICARE

## 2025-03-25 NOTE — PLAN OF CARE
Patient has completed 34 of 39 outpatient radiation treatments to the prostate without difficulty.  All questions and concerns addressed by MD this visit.

## 2025-03-26 ENCOUNTER — OFFICE VISIT (OUTPATIENT)
Dept: FAMILY MEDICINE | Facility: CLINIC | Age: 80
End: 2025-03-26
Payer: MEDICARE

## 2025-03-26 VITALS
HEART RATE: 57 BPM | WEIGHT: 204.13 LBS | DIASTOLIC BLOOD PRESSURE: 74 MMHG | OXYGEN SATURATION: 98 % | SYSTOLIC BLOOD PRESSURE: 128 MMHG | BODY MASS INDEX: 29.29 KG/M2

## 2025-03-26 DIAGNOSIS — I77.810 ASCENDING AORTA DILATATION: ICD-10-CM

## 2025-03-26 DIAGNOSIS — I25.119 ATHEROSCLEROSIS OF NATIVE CORONARY ARTERY OF NATIVE HEART WITH ANGINA PECTORIS: ICD-10-CM

## 2025-03-26 DIAGNOSIS — L20.9 ATOPIC DERMATITIS, UNSPECIFIED TYPE: Primary | ICD-10-CM

## 2025-03-26 DIAGNOSIS — K21.9 GERD WITHOUT ESOPHAGITIS: ICD-10-CM

## 2025-03-26 DIAGNOSIS — D69.6 THROMBOCYTOPENIA, UNSPECIFIED: ICD-10-CM

## 2025-03-26 DIAGNOSIS — C91.10 CLL (CHRONIC LYMPHOCYTIC LEUKEMIA): ICD-10-CM

## 2025-03-26 DIAGNOSIS — N18.31 STAGE 3A CHRONIC KIDNEY DISEASE: ICD-10-CM

## 2025-03-26 PROCEDURE — 99213 OFFICE O/P EST LOW 20 MIN: CPT | Mod: PBBFAC,PO | Performed by: INTERNAL MEDICINE

## 2025-03-26 PROCEDURE — 99214 OFFICE O/P EST MOD 30 MIN: CPT | Mod: S$PBB,,, | Performed by: INTERNAL MEDICINE

## 2025-03-26 PROCEDURE — G2211 COMPLEX E/M VISIT ADD ON: HCPCS | Mod: S$PBB,,, | Performed by: INTERNAL MEDICINE

## 2025-03-26 PROCEDURE — 99999 PR PBB SHADOW E&M-EST. PATIENT-LVL III: CPT | Mod: PBBFAC,,, | Performed by: INTERNAL MEDICINE

## 2025-03-26 RX ORDER — TRIAMCINOLONE ACETONIDE 1 MG/G
CREAM TOPICAL 2 TIMES DAILY
Qty: 80 G | Refills: 1 | Status: SHIPPED | OUTPATIENT
Start: 2025-03-26

## 2025-03-26 RX ORDER — PANTOPRAZOLE SODIUM 40 MG/1
40 TABLET, DELAYED RELEASE ORAL DAILY
Qty: 90 TABLET | Refills: 3 | Status: SHIPPED | OUTPATIENT
Start: 2025-03-26 | End: 2026-03-26

## 2025-03-26 NOTE — PROGRESS NOTES
Subjective     Eligio Richardson is a 80 y.o. old, male here for Follow-up and Rash    81 y/o with PMH of CAD, moderate AS, CLL, prostate ca s/p TURP, BPH, CKD stage 3, hypothyroidism, CLBP     Patient is here for follow-up on chronic medical problems  CAD, AS: stable, no CP, stable NEFF  CLL, chronic thrombocytopenia: stable  OA: bothersome neck and multiple joints with arthritis, takes meloxicam daily, continues to provide pain relief and obviously a risk given his other medical problems but he prefers to continue taking it. He will continue to take a daily PPI. He has another injection scheduled.  Prostate ca: currently getting radiation therapy, deferred getting lupron again - horrible side effects.  Hypothyroidism: Stable on LT4.  New rash on right thigh, right and left anterior legs, very pruritic, no recent exposures or changes to soap/detergent, etc.    ROS  Medications     Outpatient Medications Marked as Taking for the 3/26/25 encounter (Office Visit) with Acosta Corley MD   Medication Sig Dispense Refill    albuterol (PROVENTIL/VENTOLIN HFA) 90 mcg/actuation inhaler Inhale 2 puffs into the lungs every 6 (six) hours as needed for Shortness of Breath or Wheezing.      aluminum hydrox-magnesium carb 254-237.5 mg/5 mL Susp Take 10 mLs by mouth daily as needed (stomach upset).       aspirin 81 MG Chew Take 81 mg by mouth every other day.      carvediloL (COREG) 3.125 MG tablet Take 1 tablet (3.125 mg total) by mouth 2 (two) times daily with meals. 180 tablet 3    clopidogreL (PLAVIX) 75 mg tablet Take 1 tablet (75 mg total) by mouth once daily. 90 tablet 0    fluorouraciL (EFUDEX) 5 % cream Apply topically 2 (two) times daily. For 2 weeks. 40 g 1    fluticasone propionate (FLONASE) 50 mcg/actuation nasal spray 1 spray by Each Nostril route 2 (two) times daily.      HYDROcodone-acetaminophen (NORCO)  mg per tablet Take 1 tablet by mouth every 8 (eight) hours as needed for Pain.      GUILLERMO guadalupe/CASEY  bifidum/B longum (PROBIOTIC COLON CARE ORAL) Take 1 tablet by mouth once daily.      leuprolide (LUPRON) 22.5 mg injection Inject 22.5 mg into the muscle every 3 (three) months.      levothyroxine (SYNTHROID) 88 MCG tablet Take 1 tablet (88 mcg total) by mouth once daily. 90 tablet 3    meloxicam (MOBIC) 15 MG tablet Take 1 tablet (15 mg total) by mouth once daily. 90 tablet 3    oxybutynin (DITROPAN-XL) 10 MG 24 hr tablet Take 1 tablet (10 mg total) by mouth once daily. 90 tablet 3    rosuvastatin (CRESTOR) 10 MG tablet Take 1 tablet (10 mg total) by mouth every evening. 90 tablet 0    spironolactone (ALDACTONE) 25 MG tablet Take 0.5 tablets (12.5 mg total) by mouth once daily. 45 tablet 1    tamsulosin (FLOMAX) 0.4 mg Cap Take 1 capsule (0.4 mg total) by mouth 2 (two) times a day. 180 capsule 2    venlafaxine (EFFEXOR-XR) 37.5 MG 24 hr capsule Take 1 capsule (37.5 mg total) by mouth once daily. 30 capsule 11    [DISCONTINUED] pantoprazole (PROTONIX) 40 MG tablet Take 1 tablet (40 mg total) by mouth once daily. 30 tablet 2     Objective     /74   Pulse (!) 57   Wt 92.6 kg (204 lb 2.3 oz)   SpO2 98%   BMI 29.29 kg/m²   Physical Exam  Constitutional:       General: He is not in acute distress.     Appearance: Normal appearance. He is well-developed.   Cardiovascular:      Rate and Rhythm: Normal rate and regular rhythm.      Heart sounds: Murmur heard.      Crescendo decrescendo systolic murmur is present with a grade of 3/6.   Pulmonary:      Effort: Pulmonary effort is normal. No respiratory distress.      Breath sounds: Normal breath sounds.   Skin:     Findings: Lesion and rash present.       Assessment and Plan     Atopic dermatitis, unspecified type  -     triamcinolone acetonide 0.1% (KENALOG) 0.1 % cream; Apply topically 2 (two) times daily.  Dispense: 80 g; Refill: 1    GERD without esophagitis  -     pantoprazole (PROTONIX) 40 MG tablet; Take 1 tablet (40 mg total) by mouth once daily.  Dispense:  90 tablet; Refill: 3    Atherosclerosis of native coronary artery of native heart with angina pectoris    CLL (chronic lymphocytic leukemia)    Stage 3a chronic kidney disease    Thrombocytopenia, unspecified    Ascending aorta dilatation      Start kenalog and moisturizer, other chronic medical problems are stable. Continue meds and treatment plan as above.  ___________________  Acosta Corley MD  Internal Medicine and Pediatrics

## 2025-04-01 ENCOUNTER — HOSPITAL ENCOUNTER (OUTPATIENT)
Dept: RADIATION THERAPY | Facility: HOSPITAL | Age: 80
Discharge: HOME | End: 2025-04-01
Attending: RADIOLOGY
Payer: MEDICARE

## 2025-04-01 ENCOUNTER — DOCUMENTATION ONLY (OUTPATIENT)
Dept: RADIATION ONCOLOGY | Facility: CLINIC | Age: 80
End: 2025-04-01
Payer: MEDICARE

## 2025-04-01 NOTE — PLAN OF CARE
Patient has completed all outpatient radiation treatments to the prostate this visit.  Follow-up information and appointments provided and patient verbalizes understanding.  All questions and concerns addressed by MD.

## 2025-04-08 DIAGNOSIS — R06.02 SOB (SHORTNESS OF BREATH): Primary | ICD-10-CM

## 2025-04-08 RX ORDER — SPIRONOLACTONE 25 MG/1
12.5 TABLET ORAL DAILY
Qty: 45 TABLET | Refills: 1 | Status: SHIPPED | OUTPATIENT
Start: 2025-04-08

## 2025-04-16 DIAGNOSIS — E78.00 HYPERCHOLESTEROLEMIA: ICD-10-CM

## 2025-04-16 RX ORDER — ROSUVASTATIN CALCIUM 10 MG/1
10 TABLET, COATED ORAL NIGHTLY
Qty: 90 TABLET | Refills: 0 | Status: SHIPPED | OUTPATIENT
Start: 2025-04-16 | End: 2026-04-16

## 2025-04-16 NOTE — TELEPHONE ENCOUNTER
----- Message from Margarita sent at 4/16/2025  9:42 AM CDT -----  Contact: julia  Type:  RX Refill RequestWho Called: Julia Hernandez shoppeRefill or New Rx:refillRX Name and Strength:rosuvastatin (CRESTOR) 10 MG tabletPreferred Pharmacy with phone number:[facility] [address]Ontab: [phone] Fax: [phone] or Mail Order:localOrdering Provider:Kristal Information: pharmacy called requesting a refill , thank you   Yes

## 2025-04-18 ENCOUNTER — DOCUMENTATION ONLY (OUTPATIENT)
Dept: HEMATOLOGY/ONCOLOGY | Facility: CLINIC | Age: 80
End: 2025-04-18
Payer: MEDICARE

## 2025-04-18 ENCOUNTER — PATIENT MESSAGE (OUTPATIENT)
Dept: HEMATOLOGY/ONCOLOGY | Facility: CLINIC | Age: 80
End: 2025-04-18
Payer: MEDICARE

## 2025-04-18 NOTE — NURSING
Chart reviewed. Mr. Richardson has completed all OP RT treatments with Dr. Ibarra. F/u visit scheduled 7/3/25.     [facility] msg sent to check in. No navigation needs identified today.

## 2025-04-21 ENCOUNTER — OFFICE VISIT (OUTPATIENT)
Dept: OTOLARYNGOLOGY | Facility: CLINIC | Age: 80
End: 2025-04-21
Payer: MEDICARE

## 2025-04-21 ENCOUNTER — TELEPHONE (OUTPATIENT)
Dept: CARDIOLOGY | Facility: CLINIC | Age: 80
End: 2025-04-21
Payer: MEDICARE

## 2025-04-21 ENCOUNTER — PATIENT MESSAGE (OUTPATIENT)
Dept: CARDIOLOGY | Facility: CLINIC | Age: 80
End: 2025-04-21
Payer: MEDICARE

## 2025-04-21 DIAGNOSIS — J32.4 CHRONIC PANSINUSITIS: Primary | ICD-10-CM

## 2025-04-21 DIAGNOSIS — J33.9 NASAL POLYPOSIS: ICD-10-CM

## 2025-04-21 DIAGNOSIS — Z98.890 HISTORY OF SINUS SURGERY: ICD-10-CM

## 2025-04-21 PROCEDURE — 99214 OFFICE O/P EST MOD 30 MIN: CPT | Mod: 25,S$PBB,, | Performed by: OTOLARYNGOLOGY

## 2025-04-21 PROCEDURE — 87070 CULTURE OTHR SPECIMN AEROBIC: CPT | Performed by: OTOLARYNGOLOGY

## 2025-04-21 PROCEDURE — 99999 PR PBB SHADOW E&M-EST. PATIENT-LVL III: CPT | Mod: PBBFAC,,, | Performed by: OTOLARYNGOLOGY

## 2025-04-21 PROCEDURE — 31231 NASAL ENDOSCOPY DX: CPT | Mod: S$PBB,,, | Performed by: OTOLARYNGOLOGY

## 2025-04-21 PROCEDURE — 99213 OFFICE O/P EST LOW 20 MIN: CPT | Mod: PBBFAC,PO | Performed by: OTOLARYNGOLOGY

## 2025-04-21 PROCEDURE — 31231 NASAL ENDOSCOPY DX: CPT | Mod: PBBFAC,PO | Performed by: OTOLARYNGOLOGY

## 2025-04-21 RX ORDER — FLUTICASONE PROPIONATE 50 MCG
2 SPRAY, SUSPENSION (ML) NASAL DAILY
Qty: 16 G | Refills: 11 | Status: SHIPPED | OUTPATIENT
Start: 2025-04-21

## 2025-04-21 NOTE — PROGRESS NOTES
Subjective:       Patient ID: Eligio Richardson is a 80 y.o. male.    Chief Complaint: No chief complaint on file.    Eligio is here for follow-up.   Here today for sinus issues. History of CRSwP.   Has been doing really well until beginning of this yr. Has been feeling recurrence of head pressure / headache which is his primary concern. Nose generally has been feeling better.  Has a lot of posterior headache but feels like the anterior head pressure is worse   Has been using Neosynephrine not knowing that it was addictive.       Last OV 7/2023:  Surgery 6/2023:  Procedures performed  Septoplasty  Bilateral Maxillary Antrostomy with removal of tissue  Bilateral Total Ethmoidectomy  Bilateral Sphenoid Sinusotomy with removal of tissue  Stereotactic Navigation for 2 hours    Patient validated questionnaires (if applicable):      %            No data to display                   No data to display                   No data to display                     Review of Systems   Constitutional: Negative for activity change and appetite change.   Respiratory: Negative for difficulty breathing and wheezing   Cardiovascular: Negative for chest pain.      Objective:        Constitutional:   Vital signs are normal. He appears well-developed and well-nourished.     Head:  Normocephalic and atraumatic.     Ears:  Hearing normal to normal and whispered voice; external ear normal without scars, lesions, or masses; ear canal, tympanic membrane, and middle ear normal..     Nose:  Nose normal including turbinates, nasal mucosa, sinuses and nasal septum.   Nasal endoscopy indicated    Mouth/Throat  Oropharynx clear and moist without lesions or asymmetry.     Neck:  Neck normal without thyromegaly masses, asymmetry, normal tracheal structure, crepitus, and tenderness.         Tests / Results:  Name: Eligio Richardson     Pre-procedure diagnosis: Chronic pansinusitis [J32.4]  Post-procedure diagnosis: Same    Procedure: Bilateral nasal  endoscopy  Anesthesia:  4% Lidocaine and 0.25% Phenylephrine Topical    Indication: This procedure is indicated as anterior rhinoscopy is not sufficient to account for all of the patients symptoms.     Procedure: Risks, benefits, and alternatives of the procedure were discussed with the patient, and consent was obtained to perform a nasal endoscopy.  The nasal cavity was sprayed with a topical decongestant and topical anesthetic. After adequate anesthesia was obtained, the scope was passed into each nostril independently.  The nasal cavities (including inferior turbinates, middle turbinates, inferior meatus, middle meatus, superior meatus) nasopharynx, choana, eustachian tube, fossa of Rosenmüller, and adenoids were examined. All findings were normal with exception of description below. At the end of the examination, the scope was removed. The patient tolerated the procedure well with no complications.     LEFT:  Clean and patent sinuses, little bit of rhinorrhea cultured  RIGHT:  Clean and patent sinuses    Assessment:       No diagnosis found.      Plan:         Nasal endoscopy relatively unremarkable.  We did discuss stopping decongestant.  I did take a culture of some nasal drainage on the left  Recommend restarting regular nasal steroid spray.  Will obtain CT to evaluate for any high obstruction in his frontals.  Otherwise reconsider headache cause       12-May-2021 11:18

## 2025-04-21 NOTE — TELEPHONE ENCOUNTER
Surgical Clearance    Location: [facility]  Procedure: PRANAY inclsuing inter and trans  Anesthesia: n/a    Bloodthinners: ASA and plavix- pls advise if able to hold 7 days prior to procedure  Implants: stent (2022)    Fax: [phone]  Phone: [phone] ext. [phone]    Last seen: 10/23/24 -- canceled last two visits

## 2025-04-24 LAB — BACTERIA SPEC AEROBE CULT: NORMAL

## 2025-04-25 ENCOUNTER — RESULTS FOLLOW-UP (OUTPATIENT)
Dept: OTOLARYNGOLOGY | Facility: CLINIC | Age: 80
End: 2025-04-25

## 2025-04-29 ENCOUNTER — HOSPITAL ENCOUNTER (OUTPATIENT)
Dept: RADIOLOGY | Facility: HOSPITAL | Age: 80
Discharge: HOME | End: 2025-04-29
Attending: OTOLARYNGOLOGY
Payer: MEDICARE

## 2025-04-29 DIAGNOSIS — J33.9 NASAL POLYPOSIS: ICD-10-CM

## 2025-04-29 DIAGNOSIS — Z98.890 HISTORY OF SINUS SURGERY: ICD-10-CM

## 2025-04-29 DIAGNOSIS — J32.4 CHRONIC PANSINUSITIS: ICD-10-CM

## 2025-04-29 PROCEDURE — 70450 CT HEAD/BRAIN W/O DYE: CPT | Mod: 26,,, | Performed by: RADIOLOGY

## 2025-04-29 PROCEDURE — 70450 CT HEAD/BRAIN W/O DYE: CPT | Mod: TC,PO

## 2025-04-30 ENCOUNTER — TELEPHONE (OUTPATIENT)
Dept: OTOLARYNGOLOGY | Facility: CLINIC | Age: 80
End: 2025-04-30
Payer: MEDICARE

## 2025-04-30 NOTE — TELEPHONE ENCOUNTER
----- Message from Dick Hernandez MD sent at 4/30/2025  1:58 PM CDT -----  Eligio, your sinuses are patent and clear.  There is no infection.  There is no obstruction.  If you are still having headache concerns, I would suggest evaluation with a headache specialist.  As we   discussed the headaches in the forehead can also be related to headaches in the back of the head  ----- Message -----  From: Interface, Rad Results In  Sent: 4/30/2025   9:29 AM CDT  To: Dick Hernandez MD

## 2025-05-28 NOTE — TELEPHONE ENCOUNTER
----- Message from Emily Berger sent at 8/17/2017  2:14 PM CDT -----  Contact: Annie  Patient's wife is returning call. Please call [phone]. Thanks!   27-May-2025 18:00

## 2025-06-09 ENCOUNTER — PATIENT MESSAGE (OUTPATIENT)
Dept: AUDIOLOGY | Facility: CLINIC | Age: 80
End: 2025-06-09

## 2025-06-09 ENCOUNTER — CLINICAL SUPPORT (OUTPATIENT)
Dept: AUDIOLOGY | Facility: CLINIC | Age: 80
End: 2025-06-09
Payer: MEDICARE

## 2025-06-09 DIAGNOSIS — Z97.4 WEARS HEARING AID IN BOTH EARS: Primary | ICD-10-CM

## 2025-06-09 NOTE — PROGRESS NOTES
The patient called to extend his hearing aid warranty. I extended the warranty and the patient owes $278. I dropped the charge and contacted our financial counselors to see if one them can take the payment. The patient is aware and the payment will be taken over the phone.    Luis Bahena P90-R in color P7  Right SN: [id number]  Left SN: [id number]  Warranty expires on: 07/10/2026

## 2025-06-30 ENCOUNTER — TELEPHONE (OUTPATIENT)
Dept: CARDIOLOGY | Facility: CLINIC | Age: 80
End: 2025-06-30
Payer: MEDICARE

## 2025-06-30 NOTE — TELEPHONE ENCOUNTER
Sx Clearance     Facility: [facility]   Procedure: Cervical PRANAY   Doctor:    Implant: Lens, Screw, Stent  Blood thinner: Plavix, ASA  Fax: [phone]

## 2025-07-01 ENCOUNTER — CLINICAL SUPPORT (OUTPATIENT)
Dept: AUDIOLOGY | Facility: CLINIC | Age: 80
End: 2025-07-01
Payer: MEDICARE

## 2025-07-01 ENCOUNTER — LAB VISIT (OUTPATIENT)
Dept: LAB | Facility: HOSPITAL | Age: 80
End: 2025-07-01
Attending: RADIOLOGY
Payer: MEDICARE

## 2025-07-01 DIAGNOSIS — Z00.00 ENCOUNTER FOR MEDICARE ANNUAL WELLNESS EXAM: ICD-10-CM

## 2025-07-01 DIAGNOSIS — Z97.4 WEARS HEARING AID IN BOTH EARS: Primary | ICD-10-CM

## 2025-07-01 DIAGNOSIS — C61 PROSTATE CANCER: ICD-10-CM

## 2025-07-01 LAB — PSA SERPL-MCNC: 0.29 NG/ML

## 2025-07-01 PROCEDURE — 36415 COLL VENOUS BLD VENIPUNCTURE: CPT | Mod: PN

## 2025-07-01 PROCEDURE — 84153 ASSAY OF PSA TOTAL: CPT

## 2025-07-01 NOTE — PROGRESS NOTES
The patient came to the clinic to  Phonak Cerustop wax filters and domes for his hearing aids. I gave the patient the needed supplies and he will contact us as needed.

## 2025-07-03 ENCOUNTER — OFFICE VISIT (OUTPATIENT)
Dept: RADIATION ONCOLOGY | Facility: CLINIC | Age: 80
End: 2025-07-03
Payer: MEDICARE

## 2025-07-03 VITALS
WEIGHT: 196.88 LBS | OXYGEN SATURATION: 95 % | RESPIRATION RATE: 16 BRPM | DIASTOLIC BLOOD PRESSURE: 71 MMHG | TEMPERATURE: 98 F | SYSTOLIC BLOOD PRESSURE: 134 MMHG | HEIGHT: 70 IN | BODY MASS INDEX: 28.18 KG/M2 | HEART RATE: 88 BPM

## 2025-07-03 DIAGNOSIS — C61 PROSTATE CANCER: Primary | ICD-10-CM

## 2025-07-03 PROCEDURE — 99999 PR PBB SHADOW E&M-EST. PATIENT-LVL IV: CPT | Mod: PBBFAC,,, | Performed by: RADIOLOGY

## 2025-07-03 PROCEDURE — 99214 OFFICE O/P EST MOD 30 MIN: CPT | Mod: PBBFAC,PN | Performed by: RADIOLOGY

## 2025-07-03 NOTE — PROGRESS NOTES
07/03/2025    [facility]  Department of Radiation Oncology  [facility]  Follow Up Visit Note    Diagnosis:  Eligio Richardson is a 80 y.o. male with a(n) Grade Group 2, PSA 27 (corrected for prior finasteride), high (by PSA) risk adenocarcinoma of the prostate .  He completed a course of radiation therapy on 4/1/25. Had 6 mo ADT prior to starting which he did not tolerate well.       Oncologic History:  Oncology History   Prostate cancer   9/25/2018 Initial Diagnosis    Prostate cancer     2/5/2025 - 4/1/2025 Radiation Therapy    Treating physician: Maddison Ibarra  Total Dose: 78 Gy  Fractions: 39  Treatment Site Ref. ID Energy Dose/Fx (Gy) #Fx Dose Correction (Gy) Total Dose (Gy) Start Date End Date Elapsed Days   IM Prostate PTV 6X 2 39 / 39 0 78 2/5/2025 4/1/2025 55             Interval History  The patient presents today for a regularly scheduled follow up visit.  He was last seen in our clinic on 4/1/25 at completion of treatment.   Since that time, interval PSA on 7/1/25 was 0.29.  Last (3mo) Lupron injection given 8/2024. Continues on oxybutinin and Flomax.    PSA Diagnostic Prostate Specific Antigen   Latest Ref Rng 0.00 - 4.00 ng/mL <=4.00 ng/mL   2/1/2024 13.9 (H)     7/10/2024 0.35     1/27/2025 0.15     7/1/2025  0.29       Legend:  (H) High      History or Present Illness:  The patient is an 79 year old male with a diagnosis of prostate cancer.       He has a history of NCCN low risk prostate cancer on active surveillance which more recently progressed.  Initially diagnosed here then managed at [facility].  He was originally diagnosed with Grade Group 1 disease in 2018. He had confirmatory biopsy in 2019 that also showed Grade Group 1 disease. He had been on watchful waiting approach but his PSA, when corrected for finasteride luis to 24 recently prompting MRI and fusion biopsy on 3/21/2024.       Pathology showed Grade Group 2 disease in 2 of 12 systematic cores as well as  Grade Group 2 disease in the region of interest at the left anterior mid gland. The region of interest had up to 11 mm of cancer and suggestion of extraprostatic extension.  NCCN is high risk based on PSA. CT CAP and bone scan from 4/2024 showed no evidence of metastatic disease.      4/11/24 (outside- images not available for review) MRI:  Severe BPH showed a median lobe with intravesical component  Lesion 1: 2.7cm midline TZ at level of mid gland; + anterior EPE     He was recommended to undergo ADT and EBRT.  MRI showed a median lobe and was recommended to undergo TURP prior to radiation.      He has had Lupron x2 - q3 months. Most recent injection was 8/16.  He had significant fatigue from the ADT.  He was given a prescription for Relugolix, but it was cost prohibitive.     He underwent TURP 8/19.  Prostate volume was 52 cc.  His LUTS have improved significantly. Nocturia 1-2x. The stream is much stronger. No tumor seen on path specimen.     Most recent PSA was 0.35 from 7/24     He has a family history of prostate cancer, a KPS of 100, and his comorbidities are CLL, CAD s/p stent 2022, HTN, HLD, CKD.        PSA Diagnostic   Latest Ref Rng 0.00 - 4.00 ng/mL   6/6/2018 6.8 (H)    12/7/2020 7.8 (H)    2/1/2024 13.9 (H)    7/10/2024 0.35         He presents today to discuss the potential role of radiation therapy in his cancer care.    IPSS is 9 (post-TURP)    He reports notable erectile difficulty at baseline (on Lupron).     Rudy was unable to obtain tissue specimen for MD Roman, and Decipher testing would require another biopsy.  The patient wishes to proceed w/active radiation therapy at this time. Pt does not wish to continue ADT due to hot flashes and pronounced fatigue      History of prior irradiation: No  History of prior systemic anti-cancer therapy: Lupron  History of collagen vascular disease: No  Implanted electronic device (pacer/defib/nerve stimulator): No  Review of Systems   Review of  "Systems   Constitutional:  Negative for chills and fever.   Genitourinary:  Positive for frequency and urgency. Negative for hematuria.   Musculoskeletal:  Positive for neck pain.   Neurological:  Positive for headaches (2/2 chronic neck pain).       IPSS today is 11 (was 9 pre-treatment)    Social History:  Social History[1]    Family History:  Cancer-related family history includes Lung cancer in his father and mother; Prostate cancer in his maternal uncle and paternal uncle; Skin cancer in his father. There is no history of Esophageal cancer or Liver cancer.    Exam:  Vitals:    07/03/25 1250   BP: 134/71   Pulse: 88   Resp: 16   Temp: 98.2 °F (36.8 °C)   SpO2: 95%   Weight: 89.3 kg (196 lb 13.9 oz)   Height: 5' 10" (1.778 m)     Constitutional: Pleasant 80 y.o. male in no acute distress.  Well nourished. Well groomed.   HEENT: Normocephalic and atraumatic   Lungs: No audible wheezing.  Normal effort.   Musculoskeletal: No gross MSK deformities. Ambulates  Skin: No rashes appreciated.   Psych: Alert and oriented with appropriate mood and affect.  Neuro:   Grossly normal.      Assessment:  Recovering well from acute radiation therapy related toxicities  ECOG: (2) Ambulatory and capable of self care, unable to carry out work activity, up and about > 50% or waking hours    Plan:  Follow up in 6 months with PSA and testosterone (to evaluate for lingering effects of ADT)  Follow up with Urology as directed  He was given our contact information, and he was told that he could call our clinic at anytime if he has any questions or concerns.  Follow up with other providers as directed             [1]   Social History  Tobacco Use    Smoking status: Never    Smokeless tobacco: Never   Substance Use Topics    Alcohol use: Yes     Comment: rarely    Drug use: No     "

## 2025-07-07 DIAGNOSIS — E03.9 ACQUIRED HYPOTHYROIDISM: ICD-10-CM

## 2025-07-07 RX ORDER — LEVOTHYROXINE SODIUM 88 UG/1
88 TABLET ORAL DAILY
Qty: 90 TABLET | Refills: 3 | Status: SHIPPED | OUTPATIENT
Start: 2025-07-07

## 2025-07-07 NOTE — TELEPHONE ENCOUNTER
Care Due:                  Date            Visit Type   Department     Provider  --------------------------------------------------------------------------------                                MYCHART                              FOLLOWUP/OF  [facility]  Last Visit: 03-      FICE VISIT   MEDICINE       Acosta Corley  Next Visit: None Scheduled  None         None Found                                                            Last  Test          Frequency    Reason                     Performed    Due Date  --------------------------------------------------------------------------------    CBC.........  12 months..  meloxicam................  09- 09-    CMP.........  12 months..  meloxicam................  09- 09-    TSH.........  12 months..  levothyroxine............  09- 09-    [facility] Embedded Care Due Messages. Reference number: [id number].   7/07/2025 1:40:09 PM CDT

## 2025-07-08 DIAGNOSIS — R06.02 SOB (SHORTNESS OF BREATH): ICD-10-CM

## 2025-07-08 RX ORDER — SPIRONOLACTONE 25 MG/1
12.5 TABLET ORAL DAILY
Qty: 45 TABLET | Refills: 1 | Status: SHIPPED | OUTPATIENT
Start: 2025-07-08

## 2025-07-09 ENCOUNTER — DOCUMENTATION ONLY (OUTPATIENT)
Dept: HEMATOLOGY/ONCOLOGY | Facility: CLINIC | Age: 80
End: 2025-07-09
Payer: MEDICARE

## 2025-07-09 NOTE — NURSING
Chart reviewed. Most recent PSA 0.29ng/ml following radiation therapy. 6 month f/u visit with Dr. Ibarra already scheduled.     No navigation needs identified today.

## 2025-07-15 DIAGNOSIS — I10 PRIMARY HYPERTENSION: Chronic | ICD-10-CM

## 2025-07-16 RX ORDER — CARVEDILOL 3.12 MG/1
3.12 TABLET ORAL 2 TIMES DAILY WITH MEALS
Qty: 180 TABLET | Refills: 2 | Status: SHIPPED | OUTPATIENT
Start: 2025-07-16

## 2025-07-21 DIAGNOSIS — E78.00 HYPERCHOLESTEROLEMIA: ICD-10-CM

## 2025-07-21 RX ORDER — ROSUVASTATIN CALCIUM 10 MG/1
10 TABLET, COATED ORAL NIGHTLY
Qty: 90 TABLET | Refills: 0 | OUTPATIENT
Start: 2025-07-21 | End: 2026-07-21

## 2025-07-22 RX ORDER — ROSUVASTATIN CALCIUM 10 MG/1
10 TABLET, COATED ORAL NIGHTLY
Qty: 90 TABLET | Refills: 0 | Status: SHIPPED | OUTPATIENT
Start: 2025-07-22 | End: 2026-07-22

## 2025-07-27 DIAGNOSIS — M50.30 DEGENERATIVE DISC DISEASE, CERVICAL: ICD-10-CM

## 2025-07-27 NOTE — TELEPHONE ENCOUNTER
No care due was identified.  [facility] Embedded Care Due Messages. Reference number: [id number].   7/27/2025 4:14:29 PM CDT

## 2025-07-28 RX ORDER — MELOXICAM 15 MG/1
15 TABLET ORAL DAILY
Qty: 90 TABLET | Refills: 3 | Status: SHIPPED | OUTPATIENT
Start: 2025-07-28

## 2025-08-04 ENCOUNTER — TELEPHONE (OUTPATIENT)
Dept: NEUROLOGY | Facility: CLINIC | Age: 80
End: 2025-08-04
Payer: MEDICARE

## 2025-08-04 NOTE — TELEPHONE ENCOUNTER
Spoke to pt's wife, Annie--reports he was seeing provider at [facility] in [place] and receiving injections in his neck but they stopped working and they recommend he see someone for his headache. Reports having headaches for years. Appointment scheduled for 8/12/25 @ 8 am. Provided address and clinic location to wife.

## 2025-08-04 NOTE — TELEPHONE ENCOUNTER
Copied from CRM #[id number]. Topic: Appointments - Appointment Access  >> Aug 4, 2025 12:10 PM Reba wrote:  Type:  Sooner Appointment Request    Name of Caller:  Annie, wife at [phone]    When is the first available appointment?  Oct. 7  Symptoms:  headaches    Additional Information:  Please call and advise. Thank you

## 2025-08-06 DIAGNOSIS — N40.0 BENIGN NON-NODULAR PROSTATIC HYPERPLASIA WITHOUT LOWER URINARY TRACT SYMPTOMS: ICD-10-CM

## 2025-08-06 RX ORDER — TAMSULOSIN HYDROCHLORIDE 0.4 MG/1
0.4 CAPSULE ORAL 2 TIMES DAILY
Qty: 180 CAPSULE | Refills: 2 | Status: SHIPPED | OUTPATIENT
Start: 2025-08-06

## 2025-08-06 NOTE — TELEPHONE ENCOUNTER
Refill Routing Note   Medication(s) are not appropriate for processing by [facility] for the following reason(s):        Drug-disease interaction    [facility] action(s):  Route        Medication Therapy Plan: Drug disease: tamsulosin and prostate cancer      Appointments  past 12m or future 3m with PCP    Date Provider   Last Visit   3/26/2025 Acosta Corley MD   Next Visit   Visit date not found Acosta Corley MD   ED visits in past 90 days: 0        Note composed:5:06 PM 08/06/2025

## 2025-08-06 NOTE — TELEPHONE ENCOUNTER
No care due was identified.  [facility] Embedded Care Due Messages. Reference number: [id number].   8/06/2025 11:26:27 AM CDT

## 2025-08-07 ENCOUNTER — OFFICE VISIT (OUTPATIENT)
Dept: OPTOMETRY | Facility: CLINIC | Age: 80
End: 2025-08-07
Payer: MEDICARE

## 2025-08-07 DIAGNOSIS — R51.9 HEADACHE AROUND THE EYES: ICD-10-CM

## 2025-08-07 DIAGNOSIS — H04.123 DRY EYE SYNDROME OF BILATERAL LACRIMAL GLANDS: Primary | ICD-10-CM

## 2025-08-07 DIAGNOSIS — H52.203 MYOPIA WITH ASTIGMATISM AND PRESBYOPIA, BILATERAL: ICD-10-CM

## 2025-08-07 DIAGNOSIS — H52.13 MYOPIA WITH ASTIGMATISM AND PRESBYOPIA, BILATERAL: ICD-10-CM

## 2025-08-07 DIAGNOSIS — Z96.1 PSEUDOPHAKIA OF BOTH EYES: ICD-10-CM

## 2025-08-07 DIAGNOSIS — H52.4 MYOPIA WITH ASTIGMATISM AND PRESBYOPIA, BILATERAL: ICD-10-CM

## 2025-08-07 PROCEDURE — 99213 OFFICE O/P EST LOW 20 MIN: CPT | Mod: PBBFAC,PO

## 2025-08-07 PROCEDURE — 99999 PR PBB SHADOW E&M-EST. PATIENT-LVL III: CPT | Mod: PBBFAC,,,

## 2025-08-07 PROCEDURE — 92015 DETERMINE REFRACTIVE STATE: CPT | Mod: ,,,

## 2025-08-07 PROCEDURE — 92004 COMPRE OPH EXAM NEW PT 1/>: CPT | Mod: S$PBB,,,

## 2025-08-07 NOTE — PROGRESS NOTES
HPI    Office visit TONEY 03/2025     Pt complains of frequent headache, eye pain 4/10, watering, eye strain,   blurred va w specs. Pt has neuro appt scheduled next week but wanted to   see if specs were cause of headaches. Pt stated symptoms started around   time of previous eye exam, pt was told it would take time to adjust to new   specs but no relief, pt not happy w Rx and feels eye are straining to see   at near. Pt stated eye pain and watering will occur in am hours. Pt   prefers old rx.    Gtts: systane balance BID OU  Last edited by Yuly Delgado, OD on 8/7/2025  1:14 PM.            Assessment /Plan     For exam results, see Encounter Report.    Dry eye syndrome of bilateral lacrimal glands    Headache around the eyes    Pseudophakia of both eyes    Myopia with astigmatism and presbyopia, bilateral      Mild signs and symptoms of dry eye with complaints of epiphora. SPK present OS>OD. Ed pt on nature and chronicity of dry eye. Gave pt OTC artificial tear recommendations and advised pt to instill BID-QID OU daily for relief. Ed pt to call or message if any worsening signs or symptoms and can discuss further treatment options. Otherwise, monitor yearly for changes.  Pt reports new onset of frequent headaches - has appt to establish care with neurology. Pt reports that headaches began after getting new spec rx. New specs appear to be much too strong for pt (cylinder especially OS>OD). All ocular health WNL OD, OS with no pathology noted. Discussed with pt that headaches may be secondary to specs and advised new spec rx. Continue follow-up with neurology as scheduled.  PCIOL OU. Stable. Monitor yearly for changes.  Headache, asthenopia, excessive epiphora likely secondary to over-correction with specs. Discussed spectacle options with pt and released updated spec rx. Ed pt on moderate change in rx and adaptation to specs.    RTC: 1 year for comprehensive exam or sooner prn

## 2025-08-12 ENCOUNTER — OFFICE VISIT (OUTPATIENT)
Dept: NEUROLOGY | Facility: CLINIC | Age: 80
End: 2025-08-12
Payer: MEDICARE

## 2025-08-12 ENCOUNTER — HOSPITAL ENCOUNTER (OUTPATIENT)
Dept: RADIOLOGY | Facility: HOSPITAL | Age: 80
Discharge: HOME | End: 2025-08-12
Attending: INTERNAL MEDICINE
Payer: MEDICARE

## 2025-08-12 ENCOUNTER — HOSPITAL ENCOUNTER (OUTPATIENT)
Dept: RADIOLOGY | Facility: HOSPITAL | Age: 80
Discharge: HOME | End: 2025-08-12
Attending: PHYSICIAN ASSISTANT
Payer: MEDICARE

## 2025-08-12 ENCOUNTER — OFFICE VISIT (OUTPATIENT)
Dept: FAMILY MEDICINE | Facility: CLINIC | Age: 80
End: 2025-08-12
Payer: MEDICARE

## 2025-08-12 VITALS
HEART RATE: 59 BPM | SYSTOLIC BLOOD PRESSURE: 115 MMHG | WEIGHT: 203.5 LBS | DIASTOLIC BLOOD PRESSURE: 76 MMHG | HEIGHT: 70 IN | BODY MASS INDEX: 29.13 KG/M2

## 2025-08-12 VITALS
OXYGEN SATURATION: 97 % | HEART RATE: 54 BPM | DIASTOLIC BLOOD PRESSURE: 76 MMHG | TEMPERATURE: 97 F | WEIGHT: 201.81 LBS | BODY MASS INDEX: 28.96 KG/M2 | SYSTOLIC BLOOD PRESSURE: 116 MMHG

## 2025-08-12 DIAGNOSIS — J22 LOWER RESPIRATORY INFECTION (E.G., BRONCHITIS, PNEUMONIA, PNEUMONITIS, PULMONITIS): Primary | ICD-10-CM

## 2025-08-12 DIAGNOSIS — J22 LOWER RESPIRATORY INFECTION (E.G., BRONCHITIS, PNEUMONIA, PNEUMONITIS, PULMONITIS): ICD-10-CM

## 2025-08-12 DIAGNOSIS — C61 PROSTATE CANCER: ICD-10-CM

## 2025-08-12 DIAGNOSIS — C91.10 CLL (CHRONIC LYMPHOCYTIC LEUKEMIA): ICD-10-CM

## 2025-08-12 DIAGNOSIS — G44.86 CERVICOGENIC HEADACHE: ICD-10-CM

## 2025-08-12 DIAGNOSIS — M79.12 MYALGIA OF AUXILIARY MUSCLES, HEAD AND NECK: ICD-10-CM

## 2025-08-12 DIAGNOSIS — G43.709 CHRONIC MIGRAINE WITHOUT AURA WITHOUT STATUS MIGRAINOSUS, NOT INTRACTABLE: ICD-10-CM

## 2025-08-12 DIAGNOSIS — G44.86 CERVICOGENIC HEADACHE: Primary | ICD-10-CM

## 2025-08-12 DIAGNOSIS — R13.19 ESOPHAGEAL DYSPHAGIA: ICD-10-CM

## 2025-08-12 PROCEDURE — 71046 X-RAY EXAM CHEST 2 VIEWS: CPT | Mod: TC,PO

## 2025-08-12 PROCEDURE — 99215 OFFICE O/P EST HI 40 MIN: CPT | Mod: PBBFAC,25,27,PO | Performed by: PHYSICIAN ASSISTANT

## 2025-08-12 PROCEDURE — 99999 PR PBB SHADOW E&M-EST. PATIENT-LVL V: CPT | Mod: PBBFAC,,, | Performed by: PHYSICIAN ASSISTANT

## 2025-08-12 PROCEDURE — 99214 OFFICE O/P EST MOD 30 MIN: CPT | Mod: PBBFAC,25,PO | Performed by: INTERNAL MEDICINE

## 2025-08-12 PROCEDURE — 72052 X-RAY EXAM NECK SPINE 6/>VWS: CPT | Mod: TC,PO

## 2025-08-12 PROCEDURE — 72052 X-RAY EXAM NECK SPINE 6/>VWS: CPT | Mod: 26,,, | Performed by: RADIOLOGY

## 2025-08-12 PROCEDURE — 99999 PR PBB SHADOW E&M-EST. PATIENT-LVL IV: CPT | Mod: PBBFAC,,, | Performed by: INTERNAL MEDICINE

## 2025-08-12 PROCEDURE — 71046 X-RAY EXAM CHEST 2 VIEWS: CPT | Mod: 26,,, | Performed by: STUDENT IN AN ORGANIZED HEALTH CARE EDUCATION/TRAINING PROGRAM

## 2025-08-12 PROCEDURE — 99214 OFFICE O/P EST MOD 30 MIN: CPT | Mod: S$PBB,,, | Performed by: PHYSICIAN ASSISTANT

## 2025-08-12 RX ORDER — ALBUTEROL SULFATE 90 UG/1
1 INHALANT RESPIRATORY (INHALATION) EVERY 6 HOURS PRN
Qty: 18 G | Refills: 0 | Status: SHIPPED | OUTPATIENT
Start: 2025-08-12

## 2025-08-12 RX ORDER — VENLAFAXINE HYDROCHLORIDE 37.5 MG/1
37.5 CAPSULE, EXTENDED RELEASE ORAL DAILY
Qty: 30 CAPSULE | Refills: 11 | Status: SHIPPED | OUTPATIENT
Start: 2025-08-12 | End: 2026-08-12

## 2025-08-12 RX ORDER — AMOXICILLIN AND CLAVULANATE POTASSIUM 875; 125 MG/1; MG/1
1 TABLET, FILM COATED ORAL 2 TIMES DAILY
Qty: 14 TABLET | Refills: 0 | Status: SHIPPED | OUTPATIENT
Start: 2025-08-12 | End: 2025-08-19

## 2025-08-22 ENCOUNTER — HOSPITAL ENCOUNTER (OUTPATIENT)
Dept: RADIOLOGY | Facility: HOSPITAL | Age: 80
Discharge: HOME | End: 2025-08-22
Attending: PHYSICIAN ASSISTANT
Payer: MEDICARE

## 2025-08-22 DIAGNOSIS — R13.19 ESOPHAGEAL DYSPHAGIA: ICD-10-CM

## 2025-08-22 PROCEDURE — 76536 US EXAM OF HEAD AND NECK: CPT | Mod: 26,,, | Performed by: RADIOLOGY

## 2025-08-22 PROCEDURE — 76536 US EXAM OF HEAD AND NECK: CPT | Mod: TC,PO

## 2025-08-26 ENCOUNTER — OFFICE VISIT (OUTPATIENT)
Dept: OPTOMETRY | Facility: CLINIC | Age: 80
End: 2025-08-26
Payer: MEDICARE

## 2025-08-26 ENCOUNTER — LAB VISIT (OUTPATIENT)
Dept: LAB | Facility: HOSPITAL | Age: 80
End: 2025-08-26
Payer: MEDICARE

## 2025-08-26 DIAGNOSIS — H52.203 MYOPIA WITH ASTIGMATISM AND PRESBYOPIA, BILATERAL: Primary | ICD-10-CM

## 2025-08-26 DIAGNOSIS — H52.4 MYOPIA WITH ASTIGMATISM AND PRESBYOPIA, BILATERAL: Primary | ICD-10-CM

## 2025-08-26 DIAGNOSIS — H52.13 MYOPIA WITH ASTIGMATISM AND PRESBYOPIA, BILATERAL: Primary | ICD-10-CM

## 2025-08-26 PROCEDURE — 99212 OFFICE O/P EST SF 10 MIN: CPT | Mod: PBBFAC,PO

## 2025-08-26 PROCEDURE — 99499 UNLISTED E&M SERVICE: CPT | Mod: S$PBB,,,

## 2025-08-26 PROCEDURE — 99999 PR PBB SHADOW E&M-EST. PATIENT-LVL II: CPT | Mod: PBBFAC,,,

## 2025-08-27 ENCOUNTER — TELEPHONE (OUTPATIENT)
Dept: FAMILY MEDICINE | Facility: CLINIC | Age: 80
End: 2025-08-27
Payer: MEDICARE

## 2025-08-28 ENCOUNTER — TELEPHONE (OUTPATIENT)
Dept: GASTROENTEROLOGY | Facility: CLINIC | Age: 80
End: 2025-08-28
Payer: MEDICARE

## 2025-08-28 ENCOUNTER — LAB VISIT (OUTPATIENT)
Dept: LAB | Facility: HOSPITAL | Age: 80
End: 2025-08-28
Payer: MEDICARE

## 2025-08-28 ENCOUNTER — OFFICE VISIT (OUTPATIENT)
Dept: GASTROENTEROLOGY | Facility: CLINIC | Age: 80
End: 2025-08-28
Payer: MEDICARE

## 2025-08-28 VITALS — WEIGHT: 203.94 LBS | HEIGHT: 70 IN | BODY MASS INDEX: 29.2 KG/M2

## 2025-08-28 DIAGNOSIS — K22.4 ESOPHAGEAL DYSMOTILITY: ICD-10-CM

## 2025-08-28 DIAGNOSIS — Z79.899 LONG-TERM CURRENT USE OF PROTON PUMP INHIBITOR THERAPY: ICD-10-CM

## 2025-08-28 DIAGNOSIS — K44.9 HIATAL HERNIA: ICD-10-CM

## 2025-08-28 DIAGNOSIS — K22.2 SCHATZKI'S RING: ICD-10-CM

## 2025-08-28 DIAGNOSIS — Z79.01 CURRENT USE OF ANTICOAGULANT THERAPY: Primary | ICD-10-CM

## 2025-08-28 DIAGNOSIS — K59.01 SLOW TRANSIT CONSTIPATION: ICD-10-CM

## 2025-08-28 DIAGNOSIS — R13.19 ESOPHAGEAL DYSPHAGIA: Primary | ICD-10-CM

## 2025-08-28 DIAGNOSIS — Z79.01 CURRENT USE OF LONG TERM ANTICOAGULATION: ICD-10-CM

## 2025-08-28 DIAGNOSIS — K21.9 GASTROESOPHAGEAL REFLUX DISEASE WITHOUT ESOPHAGITIS: ICD-10-CM

## 2025-08-28 LAB
ALBUMIN SERPL BCP-MCNC: 4.2 G/DL (ref 3.5–5.2)
ALP SERPL-CCNC: 68 UNIT/L (ref 40–150)
ALT SERPL W/O P-5'-P-CCNC: 23 UNIT/L (ref 0–55)
ANION GAP (OHS): 11 MMOL/L (ref 8–16)
AST SERPL-CCNC: 22 UNIT/L (ref 0–50)
BILIRUB SERPL-MCNC: 0.8 MG/DL (ref 0.1–1)
BUN SERPL-MCNC: 26 MG/DL (ref 8–23)
CALCIUM SERPL-MCNC: 9.6 MG/DL (ref 8.7–10.5)
CHLORIDE SERPL-SCNC: 108 MMOL/L (ref 95–110)
CO2 SERPL-SCNC: 21 MMOL/L (ref 23–29)
CREAT SERPL-MCNC: 1.3 MG/DL (ref 0.5–1.4)
GFR SERPLBLD CREATININE-BSD FMLA CKD-EPI: 55 ML/MIN/1.73/M2
GLUCOSE SERPL-MCNC: 98 MG/DL (ref 70–110)
POTASSIUM SERPL-SCNC: 4.4 MMOL/L (ref 3.5–5.1)
PROT SERPL-MCNC: 6.4 GM/DL (ref 6–8.4)
SODIUM SERPL-SCNC: 140 MMOL/L (ref 136–145)
VIT B12 SERPL-MCNC: 526 PG/ML (ref 210–950)

## 2025-08-28 PROCEDURE — 80053 COMPREHEN METABOLIC PANEL: CPT

## 2025-08-28 PROCEDURE — 82607 VITAMIN B-12: CPT

## 2025-08-28 PROCEDURE — 99999 PR PBB SHADOW E&M-EST. PATIENT-LVL III: CPT | Mod: PBBFAC,,,

## 2025-08-28 PROCEDURE — 99214 OFFICE O/P EST MOD 30 MIN: CPT | Mod: S$PBB,,,

## 2025-08-28 PROCEDURE — 36415 COLL VENOUS BLD VENIPUNCTURE: CPT | Mod: PO

## 2025-08-28 PROCEDURE — 99213 OFFICE O/P EST LOW 20 MIN: CPT | Mod: PBBFAC,PO

## 2025-08-28 RX ORDER — FAMOTIDINE 40 MG/1
40 TABLET, FILM COATED ORAL NIGHTLY
Qty: 90 TABLET | Refills: 0 | Status: SHIPPED | OUTPATIENT
Start: 2025-08-28 | End: 2025-11-26

## 2025-09-01 ENCOUNTER — E-CONSULT (OUTPATIENT)
Dept: CARDIOLOGY | Facility: CLINIC | Age: 80
End: 2025-09-01
Payer: MEDICARE

## 2025-09-01 DIAGNOSIS — I25.10 CORONARY ARTERY DISEASE INVOLVING NATIVE CORONARY ARTERY OF NATIVE HEART WITHOUT ANGINA PECTORIS: Primary | ICD-10-CM

## 2025-09-01 PROCEDURE — 99451 NTRPROF PH1/NTRNET/EHR 5/>: CPT | Mod: S$PBB,,, | Performed by: INTERNAL MEDICINE

## 2025-09-03 ENCOUNTER — HOSPITAL ENCOUNTER (OUTPATIENT)
Dept: RADIOLOGY | Facility: HOSPITAL | Age: 80
Discharge: HOME | End: 2025-09-03
Attending: INTERNAL MEDICINE
Payer: MEDICARE

## 2025-09-03 DIAGNOSIS — G44.86 CERVICOGENIC HEADACHE: ICD-10-CM

## 2025-09-03 PROCEDURE — A9585 GADOBUTROL INJECTION: HCPCS | Mod: PO | Performed by: INTERNAL MEDICINE

## 2025-09-03 PROCEDURE — 70553 MRI BRAIN STEM W/O & W/DYE: CPT | Mod: TC,PO

## 2025-09-03 PROCEDURE — 72141 MRI NECK SPINE W/O DYE: CPT | Mod: 26,,, | Performed by: RADIOLOGY

## 2025-09-03 PROCEDURE — 70553 MRI BRAIN STEM W/O & W/DYE: CPT | Mod: 26,,, | Performed by: RADIOLOGY

## 2025-09-03 PROCEDURE — 72141 MRI NECK SPINE W/O DYE: CPT | Mod: TC,PO

## 2025-09-03 PROCEDURE — 25500020 PHARM REV CODE 255: Mod: PO | Performed by: INTERNAL MEDICINE

## 2025-09-03 RX ORDER — GADOBUTROL 604.72 MG/ML
9 INJECTION INTRAVENOUS
Status: COMPLETED | OUTPATIENT
Start: 2025-09-03 | End: 2025-09-03

## 2025-09-03 RX ADMIN — GADOBUTROL 9 ML: 604.72 INJECTION INTRAVENOUS at 01:09

## (undated) DEVICE — TRAP TISSUE COLLECTION BERKLE

## (undated) DEVICE — MARKER SKIN STND TIP BLUE BARR

## (undated) DEVICE — GLOVE BIOGEL ECLIPSE SZ 7.5

## (undated) DEVICE — SEE MEDLINE ITEM 152474

## (undated) DEVICE — FORMALIN 60ML PREFILLED CONT

## (undated) DEVICE — CUP MEDICINE STERILE 2OZ

## (undated) DEVICE — TRACKER PATIENT NON INVASIVE

## (undated) DEVICE — NEEDLE HYPO 18X1.5 SG

## (undated) DEVICE — RAD 40 CVD SINUS BLADE

## (undated) DEVICE — SYR 3CC LUER LOC

## (undated) DEVICE — NDL SPINAL 22GX7 SPINOCAN

## (undated) DEVICE — TUBING XPS IRRIG TO STRAIGHTSH

## (undated) DEVICE — COVER TRANSDUCER LATEX N/STERI

## (undated) DEVICE — SOL IRR STRL WATER 500ML

## (undated) DEVICE — DRAPE EENT SPLIT STERILE

## (undated) DEVICE — NDL HYPO 27G X 1 1/2

## (undated) DEVICE — GAUZE SPONGE 4X4 12PLY

## (undated) DEVICE — COVER PROXIMA MAYO STAND

## (undated) DEVICE — PAD PREP 50/CA

## (undated) DEVICE — BAG URO DRAIN

## (undated) DEVICE — SEE MEDLINE ITEM 157128

## (undated) DEVICE — TOWEL OR DISP STRL BLUE 4/PK

## (undated) DEVICE — SEE MEDLINE ITEM 146362

## (undated) DEVICE — SYR LUER LOCK TIP 6CC

## (undated) DEVICE — NDL TISSUE BIOPSY MAGNUM

## (undated) DEVICE — Device

## (undated) DEVICE — DRAPE HALF SURGICAL 40X58IN

## (undated) DEVICE — SUT ETHILON 2-0 BLK MONO PS

## (undated) DEVICE — GLOVE SURGICAL LATEX SZ 7

## (undated) DEVICE — SOL IRR NACL .9% 3000ML

## (undated) DEVICE — SCRUB 10% POVIDONE IODINE 4OZ

## (undated) DEVICE — DRESSING TRANS 4X4 TEGADERM

## (undated) DEVICE — SUT PLN GUT 4-0 SC-1SC-1 1

## (undated) DEVICE — SPONGE PATTY SURGICAL .5X3IN

## (undated) DEVICE — TRACKER ENT INSTRUMENT

## (undated) DEVICE — SEE MEDLINE ITEM 154981

## (undated) DEVICE — BLADE QUADCUT 3.4MMX13CM

## (undated) DEVICE — SPLINT NASAL AIRWAY SEPTAL SIL

## (undated) DEVICE — SEE MEDLINE ITEM 157117

## (undated) DEVICE — CONTAINER SPECIMEN OR STER 4OZ

## (undated) DEVICE — SEE L#120831

## (undated) DEVICE — DRESSING EYE OVAL LF

## (undated) DEVICE — SYR 10CC LUER LOCK

## (undated) DEVICE — NDL SPINAL 25GX3.5 SPINOCAN

## (undated) DEVICE — DRESSING NASOPORE X FRM 8CM

## (undated) DEVICE — SYR 50CC LL

## (undated) DEVICE — LUBRICANT SURGILUBE 2 OZ

## (undated) DEVICE — GAUZE SPONGE 4'X4' 8PLY NS

## (undated) DEVICE — TUBING SUC UNIV W/CONN 12FT

## (undated) DEVICE — KIT SAHARA DRAPE DRAW/LIFT

## (undated) DEVICE — PACK CYSTO

## (undated) DEVICE — NEPTUNE 4 PORT MANIFOLD

## (undated) DEVICE — SEE MEDLINE ITEM 152487

## (undated) DEVICE — SUCTION COAGULATOR 10FR 6IN

## (undated) DEVICE — KIT ANTIFOG

## (undated) DEVICE — SET TUR BLADDER IRRIG Y TYPE

## (undated) DEVICE — STRAP OR TABLE 5IN X 72IN